# Patient Record
Sex: FEMALE | Race: WHITE | NOT HISPANIC OR LATINO | Employment: FULL TIME | URBAN - METROPOLITAN AREA
[De-identification: names, ages, dates, MRNs, and addresses within clinical notes are randomized per-mention and may not be internally consistent; named-entity substitution may affect disease eponyms.]

---

## 2019-05-20 ENCOUNTER — OFFICE VISIT (OUTPATIENT)
Dept: PRIMARY CARE CLINIC | Facility: CLINIC | Age: 54
End: 2019-05-20
Payer: COMMERCIAL

## 2019-05-20 VITALS
OXYGEN SATURATION: 99 % | DIASTOLIC BLOOD PRESSURE: 80 MMHG | BODY MASS INDEX: 27.56 KG/M2 | HEART RATE: 62 BPM | WEIGHT: 146 LBS | SYSTOLIC BLOOD PRESSURE: 112 MMHG | HEIGHT: 61 IN

## 2019-05-20 DIAGNOSIS — F90.0 ATTENTION DEFICIT HYPERACTIVITY DISORDER (ADHD), PREDOMINANTLY INATTENTIVE TYPE: ICD-10-CM

## 2019-05-20 DIAGNOSIS — Z00.00 PREVENTATIVE HEALTH CARE: Primary | ICD-10-CM

## 2019-05-20 DIAGNOSIS — F41.9 ANXIETY: ICD-10-CM

## 2019-05-20 DIAGNOSIS — G47.00 INSOMNIA, UNSPECIFIED TYPE: ICD-10-CM

## 2019-05-20 DIAGNOSIS — N95.9 MENOPAUSAL DISORDER: ICD-10-CM

## 2019-05-20 PROCEDURE — 3008F PR BODY MASS INDEX (BMI) DOCUMENTED: ICD-10-PCS | Mod: CPTII,S$GLB,, | Performed by: FAMILY MEDICINE

## 2019-05-20 PROCEDURE — 99204 PR OFFICE/OUTPT VISIT, NEW, LEVL IV, 45-59 MIN: ICD-10-PCS | Mod: S$GLB,,, | Performed by: FAMILY MEDICINE

## 2019-05-20 PROCEDURE — 3008F BODY MASS INDEX DOCD: CPT | Mod: CPTII,S$GLB,, | Performed by: FAMILY MEDICINE

## 2019-05-20 PROCEDURE — 99204 OFFICE O/P NEW MOD 45 MIN: CPT | Mod: S$GLB,,, | Performed by: FAMILY MEDICINE

## 2019-05-20 RX ORDER — ESTRADIOL 1 MG/1
TABLET ORAL
COMMUNITY
End: 2019-08-02 | Stop reason: SDUPTHER

## 2019-05-20 RX ORDER — AMITRIPTYLINE HYDROCHLORIDE 25 MG/1
TABLET, FILM COATED ORAL
COMMUNITY
End: 2019-05-20

## 2019-05-20 RX ORDER — CLONAZEPAM 1 MG/1
TABLET ORAL
COMMUNITY
End: 2019-08-02 | Stop reason: SDUPTHER

## 2019-05-20 RX ORDER — HYDROXYZINE PAMOATE 50 MG/1
50 CAPSULE ORAL NIGHTLY PRN
Qty: 30 CAPSULE | Refills: 0 | Status: SHIPPED | OUTPATIENT
Start: 2019-05-20 | End: 2019-06-24

## 2019-05-20 RX ORDER — LISDEXAMFETAMINE DIMESYLATE 30 MG/1
CAPSULE ORAL
COMMUNITY
Start: 2019-04-26 | End: 2019-05-20 | Stop reason: SDUPTHER

## 2019-05-20 RX ORDER — LISDEXAMFETAMINE DIMESYLATE 30 MG/1
30 CAPSULE ORAL EVERY MORNING
Qty: 30 CAPSULE | Refills: 0 | Status: SHIPPED | OUTPATIENT
Start: 2019-05-20 | End: 2019-06-24 | Stop reason: SDUPTHER

## 2019-05-20 NOTE — PROGRESS NOTES
Subjective:       Patient ID: Isabella Santana is a 53 y.o. female.    Chief Complaint: est care    HPI     Anxiety/depression - was recently put on clonazepam prn, makes her sleepy. Was tried on multiple therapies in the past (lexapro, celexa, effexor, wellbutrin)    ADHD - on vyvanse 30 mg, may want to increase dose. Was on 40 mg in the past    Menopausal disorder - on estrace for HRT    Insomnia - takes amitriptyline nightly     Prev - MMG 6 mo ago, has never had a c-scope, did cologuard end of last year. Hx of hys. UTD on vacs    Review of Systems   Constitutional: Positive for fatigue. Negative for chills and fever.   Eyes: Negative for visual disturbance.   Respiratory: Negative for cough, chest tightness, shortness of breath and wheezing.    Cardiovascular: Negative for chest pain, palpitations and leg swelling.   Gastrointestinal: Negative for abdominal pain, constipation, diarrhea, nausea and vomiting.   Genitourinary: Negative for decreased urine volume, dysuria and frequency.   Musculoskeletal: Negative for arthralgias and myalgias.   Skin: Negative for rash.   Neurological: Negative for dizziness, syncope, weakness, light-headedness and headaches.   Psychiatric/Behavioral: Positive for dysphoric mood and sleep disturbance. The patient is nervous/anxious.        Objective:      Physical Exam   Constitutional: She is oriented to person, place, and time. She appears well-developed and well-nourished. No distress.   HENT:   Head: Normocephalic and atraumatic.   Nose: Nose normal.   Mouth/Throat: Oropharynx is clear and moist.   Eyes: Conjunctivae are normal.   Neck: Normal range of motion. Neck supple. No thyromegaly present.   Cardiovascular: Normal rate, regular rhythm and normal heart sounds. Exam reveals no gallop and no friction rub.   No murmur heard.  Pulmonary/Chest: Effort normal and breath sounds normal. No stridor. No respiratory distress. She has no wheezes. She has no rales.   Abdominal: Soft.  Bowel sounds are normal. She exhibits no distension. There is no tenderness.   Musculoskeletal: She exhibits no edema.   Neurological: She is alert and oriented to person, place, and time.   Skin: Skin is warm and dry. No rash noted. She is not diaphoretic. No erythema. No pallor.   Psychiatric: She has a normal mood and affect. Her behavior is normal.   Vitals reviewed.      Assessment:       1. Preventative health care    2. Attention deficit hyperactivity disorder (ADHD), predominantly inattentive type    3. Anxiety    4. Menopausal disorder    5. Insomnia, unspecified type        Plan:       Diagnoses and all orders for this visit:    Preventative health care  Comments:  needs mmg in 6 months, had cologuard 6 mo ago, hx hys, vacs UTD    Attention deficit hyperactivity disorder (ADHD), predominantly inattentive type  -     VYVANSE 30 mg capsule; Take 1 capsule (30 mg total) by mouth every morning.    Anxiety  Comments:  start viibryd, will wean clonazepam    Menopausal disorder  Comments:  on HRT    Insomnia, unspecified type  -     hydrOXYzine pamoate (VISTARIL) 50 MG Cap; Take 1 capsule (50 mg total) by mouth nightly as needed.

## 2019-05-21 ENCOUNTER — TELEPHONE (OUTPATIENT)
Dept: PRIMARY CARE CLINIC | Facility: CLINIC | Age: 54
End: 2019-05-21

## 2019-06-24 ENCOUNTER — OFFICE VISIT (OUTPATIENT)
Dept: PRIMARY CARE CLINIC | Facility: CLINIC | Age: 54
End: 2019-06-24
Payer: COMMERCIAL

## 2019-06-24 VITALS
HEART RATE: 97 BPM | WEIGHT: 142 LBS | OXYGEN SATURATION: 97 % | BODY MASS INDEX: 26.83 KG/M2 | DIASTOLIC BLOOD PRESSURE: 70 MMHG | SYSTOLIC BLOOD PRESSURE: 110 MMHG

## 2019-06-24 DIAGNOSIS — L25.9 CONTACT DERMATITIS, UNSPECIFIED CONTACT DERMATITIS TYPE, UNSPECIFIED TRIGGER: ICD-10-CM

## 2019-06-24 DIAGNOSIS — F90.0 ATTENTION DEFICIT HYPERACTIVITY DISORDER (ADHD), PREDOMINANTLY INATTENTIVE TYPE: ICD-10-CM

## 2019-06-24 DIAGNOSIS — G47.00 INSOMNIA, UNSPECIFIED TYPE: ICD-10-CM

## 2019-06-24 DIAGNOSIS — F41.1 GENERALIZED ANXIETY DISORDER: Primary | ICD-10-CM

## 2019-06-24 PROCEDURE — 99214 PR OFFICE/OUTPT VISIT, EST, LEVL IV, 30-39 MIN: ICD-10-PCS | Mod: S$GLB,,, | Performed by: FAMILY MEDICINE

## 2019-06-24 PROCEDURE — 99214 OFFICE O/P EST MOD 30 MIN: CPT | Mod: S$GLB,,, | Performed by: FAMILY MEDICINE

## 2019-06-24 PROCEDURE — 3008F BODY MASS INDEX DOCD: CPT | Mod: CPTII,S$GLB,, | Performed by: FAMILY MEDICINE

## 2019-06-24 PROCEDURE — 3008F PR BODY MASS INDEX (BMI) DOCUMENTED: ICD-10-PCS | Mod: CPTII,S$GLB,, | Performed by: FAMILY MEDICINE

## 2019-06-24 RX ORDER — LISDEXAMFETAMINE DIMESYLATE 30 MG/1
30 CAPSULE ORAL EVERY MORNING
Qty: 30 CAPSULE | Refills: 0 | Status: SHIPPED | OUTPATIENT
Start: 2019-06-24 | End: 2019-06-24 | Stop reason: SDUPTHER

## 2019-06-24 RX ORDER — TRAZODONE HYDROCHLORIDE 50 MG/1
50 TABLET ORAL NIGHTLY
Qty: 30 TABLET | Refills: 3 | Status: SHIPPED | OUTPATIENT
Start: 2019-06-24 | End: 2019-08-02

## 2019-06-24 RX ORDER — LISDEXAMFETAMINE DIMESYLATE 30 MG/1
30 CAPSULE ORAL EVERY MORNING
Qty: 30 CAPSULE | Refills: 0 | Status: SHIPPED | OUTPATIENT
Start: 2019-06-24 | End: 2019-09-16 | Stop reason: SDUPTHER

## 2019-06-24 NOTE — PROGRESS NOTES
Subjective:       Patient ID: Isabella Santana is a 53 y.o. female.    Chief Complaint: Follow-up (stopped vybrid-diarrhea; hydroxyzine-bad dreams and would not keep pt asleep)    HPI     Anxiety - intolerant to vybriid, had diarrhea. Stopped taking on her own. Anxiety improved, thinks she may be ok only taking prn med very infrequently.     She also has persistent rash on LUE that has not improved with otc itch cream    Vertigo - had episode of dizziness recently when very stressed, took meclizine. Saw ent in the past    ADHD - doing well on current dose    Insomnia - hydroxyzine not working well and gave her strange dreams    Review of Systems   Constitutional: Negative for chills, fatigue and fever.   Eyes: Negative for visual disturbance.   Respiratory: Negative for cough, chest tightness, shortness of breath and wheezing.    Cardiovascular: Negative for chest pain, palpitations and leg swelling.   Gastrointestinal: Positive for diarrhea. Negative for abdominal pain, constipation, nausea and vomiting.   Genitourinary: Negative for dysuria and frequency.   Musculoskeletal: Negative for arthralgias and myalgias.   Skin: Positive for rash.   Neurological: Positive for dizziness. Negative for syncope, weakness, light-headedness and headaches.   Psychiatric/Behavioral: Positive for sleep disturbance. Negative for dysphoric mood. The patient is nervous/anxious.        Objective:      Physical Exam   Constitutional: She is oriented to person, place, and time. She appears well-developed and well-nourished. No distress.   HENT:   Head: Normocephalic and atraumatic.   Nose: Nose normal.   Mouth/Throat: Oropharynx is clear and moist.   Eyes: Conjunctivae are normal.   Neck: Normal range of motion. Neck supple. No JVD present. No thyromegaly present.   Cardiovascular: Normal rate, regular rhythm and normal heart sounds. Exam reveals no gallop and no friction rub.   No murmur heard.  Pulmonary/Chest: Effort normal and breath  sounds normal. No stridor. No respiratory distress. She has no wheezes.   Abdominal: Soft. She exhibits no distension.   Musculoskeletal: She exhibits no edema.   Neurological: She is alert and oriented to person, place, and time.   Skin: Skin is warm and dry. Rash (erythematous papular rash LUE) noted. She is not diaphoretic. No erythema. No pallor.   Psychiatric: She has a normal mood and affect. Her behavior is normal.   Vitals reviewed.      Assessment:       1. Generalized anxiety disorder    2. Insomnia, unspecified type    3. Contact dermatitis, unspecified contact dermatitis type, unspecified trigger    4. Attention deficit hyperactivity disorder (ADHD), predominantly inattentive type        Plan:       Isabella was seen today for follow-up.    Diagnoses and all orders for this visit:    Generalized anxiety disorder  Comments:  prn clonazepam 1-2 times per week max. if not covered on this, could try another ssri    Insomnia, unspecified type  -     traZODone (DESYREL) 50 MG tablet; Take 1 tablet (50 mg total) by mouth every evening.    Contact dermatitis, unspecified contact dermatitis type, unspecified trigger  Comments:  otc hydrocortisone    Attention deficit hyperactivity disorder (ADHD), predominantly inattentive type  -     Discontinue: VYVANSE 30 mg capsule; Take 1 capsule (30 mg total) by mouth every morning.  -     Discontinue: VYVANSE 30 mg capsule; Take 1 capsule (30 mg total) by mouth every morning. Fill 7/24/19 or later  -     VYVANSE 30 mg capsule; Take 1 capsule (30 mg total) by mouth every morning. Fill 8/24/19 or later

## 2019-08-02 ENCOUNTER — OFFICE VISIT (OUTPATIENT)
Dept: PRIMARY CARE CLINIC | Facility: CLINIC | Age: 54
End: 2019-08-02
Payer: COMMERCIAL

## 2019-08-02 VITALS — WEIGHT: 141 LBS | BODY MASS INDEX: 26.64 KG/M2 | DIASTOLIC BLOOD PRESSURE: 82 MMHG | SYSTOLIC BLOOD PRESSURE: 110 MMHG

## 2019-08-02 DIAGNOSIS — F41.1 GENERALIZED ANXIETY DISORDER: Primary | ICD-10-CM

## 2019-08-02 DIAGNOSIS — N95.9 MENOPAUSAL DISORDER: ICD-10-CM

## 2019-08-02 DIAGNOSIS — G47.00 INSOMNIA, UNSPECIFIED TYPE: ICD-10-CM

## 2019-08-02 DIAGNOSIS — F90.0 ATTENTION DEFICIT HYPERACTIVITY DISORDER (ADHD), PREDOMINANTLY INATTENTIVE TYPE: ICD-10-CM

## 2019-08-02 PROCEDURE — 3008F BODY MASS INDEX DOCD: CPT | Mod: CPTII,S$GLB,, | Performed by: FAMILY MEDICINE

## 2019-08-02 PROCEDURE — 99214 PR OFFICE/OUTPT VISIT, EST, LEVL IV, 30-39 MIN: ICD-10-PCS | Mod: S$GLB,,, | Performed by: FAMILY MEDICINE

## 2019-08-02 PROCEDURE — 3008F PR BODY MASS INDEX (BMI) DOCUMENTED: ICD-10-PCS | Mod: CPTII,S$GLB,, | Performed by: FAMILY MEDICINE

## 2019-08-02 PROCEDURE — 99214 OFFICE O/P EST MOD 30 MIN: CPT | Mod: S$GLB,,, | Performed by: FAMILY MEDICINE

## 2019-08-02 RX ORDER — CLONAZEPAM 1 MG/1
1 TABLET ORAL 2 TIMES DAILY PRN
Qty: 60 TABLET | Refills: 0 | Status: SHIPPED | OUTPATIENT
Start: 2019-08-02 | End: 2019-11-05 | Stop reason: SDUPTHER

## 2019-08-02 RX ORDER — DOXEPIN HYDROCHLORIDE 25 MG/1
25 CAPSULE ORAL NIGHTLY PRN
Qty: 30 CAPSULE | Refills: 1 | Status: SHIPPED | OUTPATIENT
Start: 2019-08-02 | End: 2019-08-25 | Stop reason: SDUPTHER

## 2019-08-02 RX ORDER — ESTRADIOL 1 MG/1
1 TABLET ORAL DAILY
Qty: 90 TABLET | Refills: 1 | Status: SHIPPED | OUTPATIENT
Start: 2019-08-02 | End: 2020-01-22

## 2019-08-02 NOTE — PROGRESS NOTES
"Subjective:       Patient ID: Isabella Santana is a 53 y.o. female.    Chief Complaint: Follow-up    HPI     F/u of chronic medical conditions.    Insomnia - failed on trazodone, amitriptyline and vistaril. Has also tried otc meds without success    ADHD - doing well on current dose of med. No cp, palps, chest tightness, gallo, ble swelling. bp well controlled.    Anxiety - takes clonazepam infrequently and anxiety has been well controlled on this. Pt says she is feeling more "like herself" the past couple of weeks.    She is also on HRT and needs refill of estrace tabs. Hx hys and is utd on mmg.     Review of Systems   Constitutional: Negative for chills, diaphoresis, fatigue and fever.   HENT: Negative for congestion, hearing loss, rhinorrhea and sore throat.    Eyes: Negative for pain and visual disturbance.   Respiratory: Negative for cough, chest tightness, shortness of breath and wheezing.    Cardiovascular: Negative for chest pain, palpitations and leg swelling.   Gastrointestinal: Negative for abdominal pain, constipation, diarrhea, nausea and vomiting.   Genitourinary: Negative for decreased urine volume, dysuria, frequency and hematuria.   Musculoskeletal: Negative for arthralgias, back pain and myalgias.   Skin: Negative for color change, pallor and rash.   Neurological: Negative for dizziness, syncope, weakness, light-headedness and headaches.   Psychiatric/Behavioral: Positive for sleep disturbance. Negative for dysphoric mood. The patient is nervous/anxious.        Objective:      Physical Exam   Constitutional: She is oriented to person, place, and time. She appears well-developed and well-nourished. No distress.   HENT:   Head: Normocephalic and atraumatic.   Nose: Nose normal.   Mouth/Throat: Oropharynx is clear and moist.   Eyes: Conjunctivae are normal.   Neck: Normal range of motion. Neck supple. No JVD present. No thyromegaly present.   Cardiovascular: Normal rate, regular rhythm, normal heart " sounds and intact distal pulses. Exam reveals no gallop and no friction rub.   No murmur heard.  Pulmonary/Chest: Effort normal and breath sounds normal. No stridor. No respiratory distress. She has no wheezes. She has no rales.   Abdominal: Soft. Bowel sounds are normal. She exhibits no distension. There is no tenderness.   Musculoskeletal: She exhibits no edema or tenderness.   Lymphadenopathy:     She has no cervical adenopathy.   Neurological: She is alert and oriented to person, place, and time.   Skin: Skin is warm and dry. No rash noted. She is not diaphoretic. No erythema. No pallor.   Psychiatric: She has a normal mood and affect. Her behavior is normal.   Vitals reviewed.      Assessment:       1. Generalized anxiety disorder    2. Insomnia, unspecified type    3. Attention deficit hyperactivity disorder (ADHD), predominantly inattentive type    4. Menopausal disorder        Plan:       Generalized anxiety disorder  -     clonazePAM (KLONOPIN) 1 MG tablet; Take 1 tablet (1 mg total) by mouth 2 (two) times daily as needed for Anxiety.  Dispense: 60 tablet; Refill: 0    Insomnia, unspecified type  -     doxepin (SINEQUAN) 25 MG capsule; Take 1 capsule (25 mg total) by mouth nightly as needed (sleep).  Dispense: 30 capsule; Refill: 1    Attention deficit hyperactivity disorder (ADHD), predominantly inattentive type  Comments:  has refills vyvanse    Menopausal disorder  -     estradiol (ESTRACE) 1 MG tablet; Take 1 tablet (1 mg total) by mouth once daily.  Dispense: 90 tablet; Refill: 1

## 2019-08-25 DIAGNOSIS — G47.00 INSOMNIA, UNSPECIFIED TYPE: ICD-10-CM

## 2019-08-26 RX ORDER — DOXEPIN HYDROCHLORIDE 25 MG/1
25 CAPSULE ORAL NIGHTLY PRN
Qty: 30 CAPSULE | Refills: 1 | Status: SHIPPED | OUTPATIENT
Start: 2019-08-26 | End: 2019-09-20 | Stop reason: SDUPTHER

## 2019-09-16 ENCOUNTER — OFFICE VISIT (OUTPATIENT)
Dept: PRIMARY CARE CLINIC | Facility: CLINIC | Age: 54
End: 2019-09-16
Payer: COMMERCIAL

## 2019-09-16 VITALS
BODY MASS INDEX: 25.86 KG/M2 | OXYGEN SATURATION: 97 % | HEART RATE: 68 BPM | WEIGHT: 137 LBS | DIASTOLIC BLOOD PRESSURE: 72 MMHG | RESPIRATION RATE: 18 BRPM | HEIGHT: 61 IN | SYSTOLIC BLOOD PRESSURE: 112 MMHG

## 2019-09-16 DIAGNOSIS — E53.8 VITAMIN B12 DEFICIENCY: ICD-10-CM

## 2019-09-16 DIAGNOSIS — F90.0 ATTENTION DEFICIT HYPERACTIVITY DISORDER (ADHD), PREDOMINANTLY INATTENTIVE TYPE: ICD-10-CM

## 2019-09-16 DIAGNOSIS — E03.9 HYPOTHYROIDISM, UNSPECIFIED TYPE: ICD-10-CM

## 2019-09-16 DIAGNOSIS — L30.9 ECZEMA, UNSPECIFIED TYPE: ICD-10-CM

## 2019-09-16 DIAGNOSIS — Z13.1 DIABETES MELLITUS SCREENING: ICD-10-CM

## 2019-09-16 DIAGNOSIS — Z00.00 ANNUAL PHYSICAL EXAM: Primary | ICD-10-CM

## 2019-09-16 DIAGNOSIS — Z12.11 COLON CANCER SCREENING: ICD-10-CM

## 2019-09-16 DIAGNOSIS — E55.9 VITAMIN D DEFICIENCY: ICD-10-CM

## 2019-09-16 DIAGNOSIS — Z12.39 BREAST SCREENING: ICD-10-CM

## 2019-09-16 PROCEDURE — 3008F PR BODY MASS INDEX (BMI) DOCUMENTED: ICD-10-PCS | Mod: CPTII,S$GLB,, | Performed by: NURSE PRACTITIONER

## 2019-09-16 PROCEDURE — 99214 OFFICE O/P EST MOD 30 MIN: CPT | Mod: S$GLB,,, | Performed by: NURSE PRACTITIONER

## 2019-09-16 PROCEDURE — 99214 PR OFFICE/OUTPT VISIT, EST, LEVL IV, 30-39 MIN: ICD-10-PCS | Mod: S$GLB,,, | Performed by: NURSE PRACTITIONER

## 2019-09-16 PROCEDURE — 3008F BODY MASS INDEX DOCD: CPT | Mod: CPTII,S$GLB,, | Performed by: NURSE PRACTITIONER

## 2019-09-16 RX ORDER — HYDROXYZINE HYDROCHLORIDE 25 MG/1
TABLET, FILM COATED ORAL
Qty: 60 TABLET | Refills: 2 | Status: SHIPPED | OUTPATIENT
Start: 2019-09-16 | End: 2020-06-17

## 2019-09-16 RX ORDER — LISDEXAMFETAMINE DIMESYLATE 30 MG/1
30 CAPSULE ORAL EVERY MORNING
Qty: 30 CAPSULE | Refills: 0 | Status: SHIPPED | OUTPATIENT
Start: 2019-09-16 | End: 2019-11-05 | Stop reason: SDUPTHER

## 2019-09-16 RX ORDER — TRIAMCINOLONE ACETONIDE 1 MG/G
CREAM TOPICAL 2 TIMES DAILY
Qty: 45 G | Refills: 2 | Status: SHIPPED | OUTPATIENT
Start: 2019-09-16 | End: 2020-06-17

## 2019-09-16 NOTE — PATIENT INSTRUCTIONS
Treating Attention Deficit/Hyperactivity Disorder (ADHD, ADD) in Adults  Attention deficit hyperactivity disorder (ADHD) starts in childhood. It may continue throughout your life. When it does, it may affect your job and even your relationships. Fortunately, with help, you can manage ADHD.     Treatment for ADD can help you achieve your goals.   Therapy  Your therapist can help you learn healthy ways to cope with ADHD. Sometimes, your partner or family may attend your sessions with you. This helps them understand more about your disorder.  Coaching  An ADHD  works with you to achieve your goals. Youll learn the best ways to manage your time. Youll also learn to avoid clutter and noise that may distract you. In time, your life will have more order and structure. And your  will provide support and feedback on your progress.  Work  Look for jobs where you can be free and creative. Avoid those that are dull or centered on details. You may still need to make a special effort. The following tips may help:  · Try to work at home, at least part-time.  · Ask for a private office.  · Use headphones to muffle noise.  · Work on more than one project at the same time. When you get bored with one, switch to the other.  · Work on boring tasks when you feel most alert.  · Have a schedule for each day.  · Ask your  or  to help with details.  · Use a day planner and to-do lists. Write yourself notes.  · Reward yourself when you finish a task.  Medicines  In some cases, medicines can help control symptoms of ADHD. Your healthcare provider may prescribe a stimulant to help you stay focused. Or you may take a type of antidepressant. It may take some time to find what works best for you. Keep in mind that medicines dont cure ADHD. And they may cause side effects such as headaches, trouble sleeping, or stomach problems. If youre bothered by side effects, be sure to tell your healthcare provider.  Changing the dose or type of medicine may help. Most often, youll use medicine along with therapy, coaching, and lifestyle changes.  Date Last Reviewed: 1/1/2017  © 4100-8279 [employer]. [address] [place] [address], [place] [address]. All rights reserved. This information is not intended as a substitute for professional medical care. Always follow your healthcare professional's instructions.

## 2019-09-16 NOTE — PROGRESS NOTES
Subjective:       Patient ID: Isabella Santana is a 53 y.o. female.    Chief Complaint: Follow-up (started on doxepin/need vyvanse rf)    HPI:    Presents for check up.     ADD - needs RF on Vyvanse, tolerating weil, no s/e, able to stay focused and complete tasks within timely manner.     Insomnia - improved and controlled with current med regimen. Has never had sleep study.     Has not had labs done in > 6 months.     Hormone therapy - tolerating well, no s/e.     C/o intermittent eczema - left upper arm, flares up occasionally, and OTC steroid cream does not help much. Itches and burns.       Is due for mammogram.     Colon cancer screen - does cologuard d/t sever tear in colon that occurred in 2nd childbirth.    GYN exam- had total hysterectomy.    Bone density scan - 2 years ago normal    Vaccines - get at work        Review of Systems   Constitutional: Positive for fatigue (at times). Negative for activity change, chills, diaphoresis and fever.   HENT: Negative for ear pain, mouth sores, nosebleeds and trouble swallowing.    Eyes: Negative for pain and visual disturbance.   Respiratory: Negative for cough, chest tightness and shortness of breath.    Cardiovascular: Negative for chest pain, palpitations and leg swelling.   Gastrointestinal: Negative for abdominal distention, abdominal pain and blood in stool.   Endocrine: Negative for polydipsia, polyphagia and polyuria.   Genitourinary: Negative for flank pain, frequency and hematuria.   Musculoskeletal: Negative for gait problem, joint swelling, neck pain and neck stiffness.   Skin: Negative for pallor.   Neurological: Negative for dizziness, syncope, light-headedness and headaches.   Hematological: Negative for adenopathy. Does not bruise/bleed easily.   Psychiatric/Behavioral: Negative for confusion and suicidal ideas.     Objective:     Physical Exam   Constitutional: She is oriented to person, place, and time. She appears well-developed and well-nourished.  No distress.   HENT:   Head: Normocephalic and atraumatic.   Mouth/Throat: Mucous membranes are normal. Mucous membranes are not pale, not dry and not cyanotic.   Eyes: Pupils are equal, round, and reactive to light. Conjunctivae and EOM are normal.   Neck: Normal range of motion. Neck supple. No JVD present. No tracheal deviation present. No thyromegaly present.   Cardiovascular: Normal rate, regular rhythm, normal heart sounds and intact distal pulses.   Pulmonary/Chest: Effort normal and breath sounds normal. No stridor. No respiratory distress. She has no wheezes. She has no rales.   Abdominal: Soft. Bowel sounds are normal. She exhibits no distension. There is no tenderness. There is no guarding.   Musculoskeletal: Normal range of motion. She exhibits no edema.   Lymphadenopathy:     She has no cervical adenopathy.   Neurological: She is alert and oriented to person, place, and time.   Skin: Skin is warm and dry. Capillary refill takes less than 2 seconds. She is not diaphoretic.   Psychiatric: She has a normal mood and affect. Her speech is normal and behavior is normal. Judgment and thought content normal. Cognition and memory are normal.   Nursing note and vitals reviewed.    Assessment:      1. Annual physical exam    2. Hypothyroidism, unspecified type    3. Vitamin B12 deficiency    4. Vitamin D deficiency    5. Diabetes mellitus screening    6. Breast screening    7. Colon cancer screening    8. Attention deficit hyperactivity disorder (ADHD), predominantly inattentive type    9. Eczema, unspecified type    10. BMI 25.0-25.9,adult      Plan:     Annual physical exam  Comments:  Labs.   Orders:  -     CBC auto differential; Future; Expected date: 09/16/2019  -     Comprehensive metabolic panel; Future; Expected date: 09/16/2019  -     TSH; Future; Expected date: 09/16/2019  -     Urinalysis, Reflex to Urine Culture Urine, Clean Catch; Future  -     Lipid panel; Future; Expected date:  09/16/2019    Hypothyroidism, unspecified type  -     T3, free; Future; Expected date: 09/16/2019  -     T4, free; Future; Expected date: 09/16/2019    Vitamin B12 deficiency  -     Vitamin B12/folate, serum panel; Future; Expected date: 09/16/2019    Vitamin D deficiency  -     Vitamin D; Future; Expected date: 09/16/2019    Diabetes mellitus screening  -     Hemoglobin A1c; Future; Expected date: 09/16/2019    Breast screening  Comments:  Order mammogram.  Orders:  -     Mammo Digital Screening Bilat; Future; Expected date: 09/16/2019    Colon cancer screening  Comments:  Refuses colonscopy d/t severe tear colon that occured with second childbirth.  Orders:  -     Cologuard Screening (Multitarget Stool DNA); Future; Expected date: 09/16/2019    Attention deficit hyperactivity disorder (ADHD), predominantly inattentive type  Comments:  RF Vyvanse.   Orders:  -     VYVANSE 30 mg capsule; Take 1 capsule (30 mg total) by mouth every morning.  Dispense: 30 capsule; Refill: 0    Eczema, unspecified type  Comments:  Meds as prescribed.   Orders:  -     triamcinolone acetonide 0.1% (KENALOG) 0.1 % cream; Apply topically 2 (two) times daily. No longer than 14 days at a time.  Dispense: 45 g; Refill: 2  -     hydrOXYzine HCl (ATARAX) 25 MG tablet; 1/2 tab - 1 tab TID PRN itching during eczema flare up  Dispense: 60 tablet; Refill: 2    BMI 25.0-25.9,adult  Comments:  Discussed diet and exercise.     LA  checked and consistent with pt hx. RTC in 3 months for check up or sooner if needed. Will call each month for RF and will send electronically.      Will check labs and further develop POC based on results.     Cont meds as directed.     Risks, benefits, and alternatives discussed with patient, Patient verbalized understanding of discussed plan of care. Asked patient if any further questions, answered no.

## 2019-09-18 DIAGNOSIS — E55.9 VITAMIN D DEFICIENCY: Primary | ICD-10-CM

## 2019-09-18 LAB
25(OH)D3 SERPL-MCNC: 23 NG/ML (ref 30–100)
ALBUMIN SERPL-MCNC: 4.9 G/DL (ref 3.6–5.1)
ALBUMIN/GLOB SERPL: 2 (CALC) (ref 1–2.5)
ALP SERPL-CCNC: 67 U/L (ref 33–130)
ALT SERPL-CCNC: 9 U/L (ref 6–29)
APPEARANCE UR: CLEAR
AST SERPL-CCNC: 14 U/L (ref 10–35)
BACTERIA #/AREA URNS HPF: ABNORMAL /HPF
BACTERIA UR CULT: ABNORMAL
BACTERIA UR CULT: NORMAL
BASOPHILS # BLD AUTO: 21 CELLS/UL (ref 0–200)
BASOPHILS NFR BLD AUTO: 0.4 %
BILIRUB SERPL-MCNC: 0.4 MG/DL (ref 0.2–1.2)
BILIRUB UR QL STRIP: NEGATIVE
BUN SERPL-MCNC: 11 MG/DL (ref 7–25)
BUN/CREAT SERPL: NORMAL (CALC) (ref 6–22)
CALCIUM SERPL-MCNC: 9.3 MG/DL (ref 8.6–10.4)
CAOX CRY #/AREA URNS HPF: ABNORMAL /HPF
CHLORIDE SERPL-SCNC: 102 MMOL/L (ref 98–110)
CHOLEST SERPL-MCNC: 253 MG/DL
CHOLEST/HDLC SERPL: 2.6 (CALC)
CO2 SERPL-SCNC: 24 MMOL/L (ref 20–32)
COLOR UR: ABNORMAL
CREAT SERPL-MCNC: 0.93 MG/DL (ref 0.5–1.05)
EOSINOPHIL # BLD AUTO: 111 CELLS/UL (ref 15–500)
EOSINOPHIL NFR BLD AUTO: 2.1 %
ERYTHROCYTE [DISTWIDTH] IN BLOOD BY AUTOMATED COUNT: 13.4 % (ref 11–15)
FOLATE SERPL-MCNC: 10.3 NG/ML
GFRSERPLBLD MDRD-ARVRAT: 69 ML/MIN/1.73M2
GLOBULIN SER CALC-MCNC: 2.4 G/DL (CALC) (ref 1.9–3.7)
GLUCOSE SERPL-MCNC: 83 MG/DL (ref 65–99)
GLUCOSE UR QL STRIP: NEGATIVE
HBA1C MFR BLD: 5 % OF TOTAL HGB
HCT VFR BLD AUTO: 36.7 % (ref 35–45)
HDLC SERPL-MCNC: 96 MG/DL
HGB BLD-MCNC: 12.1 G/DL (ref 11.7–15.5)
HGB UR QL STRIP: NEGATIVE
HYALINE CASTS #/AREA URNS LPF: ABNORMAL /LPF
KETONES UR QL STRIP: ABNORMAL
LDLC SERPL CALC-MCNC: 134 MG/DL (CALC)
LEUKOCYTE ESTERASE UR QL STRIP: ABNORMAL
LYMPHOCYTES # BLD AUTO: 1919 CELLS/UL (ref 850–3900)
LYMPHOCYTES NFR BLD AUTO: 36.2 %
MCH RBC QN AUTO: 26.6 PG (ref 27–33)
MCHC RBC AUTO-ENTMCNC: 33 G/DL (ref 32–36)
MCV RBC AUTO: 80.7 FL (ref 80–100)
MONOCYTES # BLD AUTO: 292 CELLS/UL (ref 200–950)
MONOCYTES NFR BLD AUTO: 5.5 %
NEUTROPHILS # BLD AUTO: 2957 CELLS/UL (ref 1500–7800)
NEUTROPHILS NFR BLD AUTO: 55.8 %
NITRITE UR QL STRIP: NEGATIVE
NONHDLC SERPL-MCNC: 157 MG/DL (CALC)
PH UR STRIP: ABNORMAL [PH] (ref 5–8)
PLATELET # BLD AUTO: 250 THOUSAND/UL (ref 140–400)
PMV BLD REES-ECKER: 11.3 FL (ref 7.5–12.5)
POTASSIUM SERPL-SCNC: 3.6 MMOL/L (ref 3.5–5.3)
PROT SERPL-MCNC: 7.3 G/DL (ref 6.1–8.1)
PROT UR QL STRIP: NEGATIVE
RBC # BLD AUTO: 4.55 MILLION/UL (ref 3.8–5.1)
RBC #/AREA URNS HPF: ABNORMAL /HPF
SODIUM SERPL-SCNC: 139 MMOL/L (ref 135–146)
SP GR UR STRIP: 1.03 (ref 1–1.03)
SQUAMOUS #/AREA URNS HPF: ABNORMAL /HPF
T3FREE SERPL-MCNC: 3.2 PG/ML (ref 2.3–4.2)
T4 FREE SERPL-MCNC: 1.2 NG/DL (ref 0.8–1.8)
TRIGL SERPL-MCNC: 121 MG/DL
TSH SERPL-ACNC: 1.61 MIU/L
VIT B12 SERPL-MCNC: 413 PG/ML (ref 200–1100)
WBC # BLD AUTO: 5.3 THOUSAND/UL (ref 3.8–10.8)
WBC #/AREA URNS HPF: ABNORMAL /HPF

## 2019-09-18 RX ORDER — ERGOCALCIFEROL 1.25 MG/1
50000 CAPSULE ORAL
Qty: 12 CAPSULE | Refills: 0 | Status: SHIPPED | OUTPATIENT
Start: 2019-09-18 | End: 2019-12-03 | Stop reason: SDUPTHER

## 2019-09-18 NOTE — PROGRESS NOTES
Please notify pt of results:   1. Cholesterol is elevated - , supposed to < 70. I would recommend STATIN therapy but if she does not want to start medicine now that is ok, she can try low cholesterol diet and exercise first.     2. Vit D level is low - I will send out Vit D to be taken once a week for 12 weeks, then we will recheck next visit.     Everything else was good : )

## 2019-09-20 DIAGNOSIS — G47.00 INSOMNIA, UNSPECIFIED TYPE: ICD-10-CM

## 2019-09-20 RX ORDER — TRAZODONE HYDROCHLORIDE 50 MG/1
TABLET ORAL
Qty: 90 TABLET | Refills: 1 | Status: SHIPPED | OUTPATIENT
Start: 2019-09-20 | End: 2020-06-17

## 2019-09-20 RX ORDER — DOXEPIN HYDROCHLORIDE 25 MG/1
25 CAPSULE ORAL NIGHTLY PRN
Qty: 30 CAPSULE | Refills: 1 | Status: SHIPPED | OUTPATIENT
Start: 2019-09-20 | End: 2019-10-17 | Stop reason: SDUPTHER

## 2019-09-23 ENCOUNTER — TELEPHONE (OUTPATIENT)
Dept: PRIMARY CARE CLINIC | Facility: CLINIC | Age: 54
End: 2019-09-23

## 2019-09-23 NOTE — TELEPHONE ENCOUNTER
----- Message from [facility] sent at 9/18/2019  2:29 PM CDT -----  Please notify pt of results:   1. Cholesterol is elevated - , supposed to < 70. I would recommend STATIN therapy but if she does not want to start medicine now that is ok, she can try low cholesterol diet and exercise first.     2. Vit D level is low - I will send out Vit D to be taken once a week for 12 weeks, then we will recheck next visit.     Everything else was good : )

## 2019-10-17 DIAGNOSIS — G47.00 INSOMNIA, UNSPECIFIED TYPE: ICD-10-CM

## 2019-10-17 RX ORDER — DOXEPIN HYDROCHLORIDE 25 MG/1
25 CAPSULE ORAL NIGHTLY PRN
Qty: 30 CAPSULE | Refills: 1 | Status: SHIPPED | OUTPATIENT
Start: 2019-10-17 | End: 2019-11-12 | Stop reason: SDUPTHER

## 2019-11-05 DIAGNOSIS — F90.0 ATTENTION DEFICIT HYPERACTIVITY DISORDER (ADHD), PREDOMINANTLY INATTENTIVE TYPE: ICD-10-CM

## 2019-11-05 DIAGNOSIS — F41.1 GENERALIZED ANXIETY DISORDER: ICD-10-CM

## 2019-11-05 RX ORDER — CLONAZEPAM 1 MG/1
1 TABLET ORAL 2 TIMES DAILY PRN
Qty: 60 TABLET | Refills: 2 | Status: SHIPPED | OUTPATIENT
Start: 2019-11-05 | End: 2020-06-17 | Stop reason: SDUPTHER

## 2019-11-05 RX ORDER — CLONAZEPAM 1 MG/1
1 TABLET ORAL 2 TIMES DAILY PRN
Qty: 60 TABLET | Refills: 2 | Status: SHIPPED | OUTPATIENT
Start: 2019-11-05 | End: 2019-11-05 | Stop reason: SDUPTHER

## 2019-11-05 RX ORDER — LISDEXAMFETAMINE DIMESYLATE 30 MG/1
30 CAPSULE ORAL EVERY MORNING
Qty: 30 CAPSULE | Refills: 0 | Status: SHIPPED | OUTPATIENT
Start: 2019-11-05 | End: 2019-12-02 | Stop reason: SDUPTHER

## 2019-11-12 DIAGNOSIS — G47.00 INSOMNIA, UNSPECIFIED TYPE: ICD-10-CM

## 2019-11-12 RX ORDER — DOXEPIN HYDROCHLORIDE 25 MG/1
25 CAPSULE ORAL NIGHTLY PRN
Qty: 30 CAPSULE | Refills: 5 | Status: SHIPPED | OUTPATIENT
Start: 2019-11-12 | End: 2020-03-04 | Stop reason: SDUPTHER

## 2019-12-02 DIAGNOSIS — F90.0 ATTENTION DEFICIT HYPERACTIVITY DISORDER (ADHD), PREDOMINANTLY INATTENTIVE TYPE: ICD-10-CM

## 2019-12-02 RX ORDER — LISDEXAMFETAMINE DIMESYLATE 30 MG/1
30 CAPSULE ORAL EVERY MORNING
Qty: 30 CAPSULE | Refills: 0 | Status: SHIPPED | OUTPATIENT
Start: 2019-12-02 | End: 2019-12-18 | Stop reason: DRUGHIGH

## 2019-12-03 DIAGNOSIS — E55.9 VITAMIN D DEFICIENCY: ICD-10-CM

## 2019-12-03 RX ORDER — ERGOCALCIFEROL 1.25 MG/1
50000 CAPSULE ORAL
Qty: 12 CAPSULE | Refills: 3 | Status: SHIPPED | OUTPATIENT
Start: 2019-12-03 | End: 2020-06-17

## 2019-12-18 ENCOUNTER — OFFICE VISIT (OUTPATIENT)
Dept: PRIMARY CARE CLINIC | Facility: CLINIC | Age: 54
End: 2019-12-18
Payer: COMMERCIAL

## 2019-12-18 VITALS
DIASTOLIC BLOOD PRESSURE: 80 MMHG | OXYGEN SATURATION: 99 % | HEART RATE: 82 BPM | BODY MASS INDEX: 26.81 KG/M2 | HEIGHT: 61 IN | SYSTOLIC BLOOD PRESSURE: 120 MMHG | WEIGHT: 142 LBS | RESPIRATION RATE: 16 BRPM

## 2019-12-18 DIAGNOSIS — E55.9 VITAMIN D DEFICIENCY: ICD-10-CM

## 2019-12-18 DIAGNOSIS — F90.0 ATTENTION DEFICIT HYPERACTIVITY DISORDER (ADHD), PREDOMINANTLY INATTENTIVE TYPE: Primary | ICD-10-CM

## 2019-12-18 DIAGNOSIS — F41.1 GENERALIZED ANXIETY DISORDER: ICD-10-CM

## 2019-12-18 DIAGNOSIS — G47.00 INSOMNIA, UNSPECIFIED TYPE: ICD-10-CM

## 2019-12-18 PROCEDURE — 3008F BODY MASS INDEX DOCD: CPT | Mod: CPTII,S$GLB,, | Performed by: NURSE PRACTITIONER

## 2019-12-18 PROCEDURE — 99214 PR OFFICE/OUTPT VISIT, EST, LEVL IV, 30-39 MIN: ICD-10-PCS | Mod: S$GLB,,, | Performed by: NURSE PRACTITIONER

## 2019-12-18 PROCEDURE — 99214 OFFICE O/P EST MOD 30 MIN: CPT | Mod: S$GLB,,, | Performed by: NURSE PRACTITIONER

## 2019-12-18 PROCEDURE — 3008F PR BODY MASS INDEX (BMI) DOCUMENTED: ICD-10-PCS | Mod: CPTII,S$GLB,, | Performed by: NURSE PRACTITIONER

## 2019-12-18 RX ORDER — LISDEXAMFETAMINE DIMESYLATE 40 MG/1
40 CAPSULE ORAL DAILY
Qty: 30 CAPSULE | Refills: 0 | Status: SHIPPED | OUTPATIENT
Start: 2019-12-18 | End: 2020-01-16 | Stop reason: SDUPTHER

## 2019-12-18 NOTE — PROGRESS NOTES
dSubjective:       Patient ID: Isabella Santana is a 54 y.o. female.    Chief Complaint: Follow-up (3mth )    HPI:    Presents for 3 month check up.    ADD - Vyvanse 30mg, tolerating well, but not lasting through the day. Would like to increase to 40mg. Denies s/e.     Anxiety - mood stable, tolerating meds well, no s/e.     Insomnia - sleeping well, tolerating meds well, no s/e.     Vit D def - on vitamin, tolerating well.                 Patient  has a past medical history of ADHD (attention deficit hyperactivity disorder), Anxiety, Hormone deficiency, and Insomnia.    Patient  has a past surgical history that includes [place] tooth extraction; Hysterectomy; and Breast biopsy (Left).    Patient family history includes Cancer in her father and sister; Diabetes in her mother; Heart disease in her father.    Patient  reports that she has quit smoking. Her smoking use included vaping w/o nicotine. She has never used smokeless tobacco. She reports that she drinks alcohol. She reports that she does not use drugs.    Review of Systems   Constitutional: Positive for fatigue (at times). Negative for activity change, chills, diaphoresis and fever.   HENT: Negative for ear pain, mouth sores, nosebleeds and trouble swallowing.    Eyes: Negative for pain and visual disturbance.   Respiratory: Negative for cough, chest tightness and shortness of breath.    Cardiovascular: Negative for chest pain, palpitations and leg swelling.   Gastrointestinal: Negative for abdominal distention, abdominal pain and blood in stool.   Endocrine: Negative for polydipsia, polyphagia and polyuria.   Genitourinary: Negative for flank pain, frequency and hematuria.   Musculoskeletal: Negative for gait problem, joint swelling, neck pain and neck stiffness.   Skin: Negative for pallor.   Neurological: Negative for dizziness, syncope, light-headedness and headaches.   Hematological: Negative for adenopathy. Does not bruise/bleed easily.  "  Psychiatric/Behavioral: Negative for confusion and suicidal ideas.     Objective:     Vitals:    12/18/19 1314   BP: 120/80   BP Location: Right arm   Patient Position: Sitting   BP Method: Small (Manual)   Pulse: 82   Resp: 16   SpO2: 99%   Weight: 64.4 kg (142 lb)   Height: 5' 1" (1.549 m)       Physical Exam   Constitutional: She is oriented to person, place, and time. She appears well-developed and well-nourished. No distress.   HENT:   Head: Normocephalic and atraumatic.   Mouth/Throat: Mucous membranes are normal. Mucous membranes are not pale, not dry and not cyanotic.   Eyes: Pupils are equal, round, and reactive to light. Conjunctivae and EOM are normal.   Neck: Normal range of motion. Neck supple. No JVD present. No tracheal deviation present. No thyromegaly present.   Cardiovascular: Normal rate, regular rhythm, normal heart sounds and intact distal pulses.   Pulmonary/Chest: Effort normal and breath sounds normal. No stridor. No respiratory distress. She has no wheezes. She has no rales.   Abdominal: Soft. Bowel sounds are normal. She exhibits no distension. There is no tenderness. There is no guarding.   Musculoskeletal: Normal range of motion. She exhibits no edema.   Lymphadenopathy:     She has no cervical adenopathy.   Neurological: She is alert and oriented to person, place, and time.   Skin: Skin is warm and dry. Capillary refill takes less than 2 seconds. She is not diaphoretic.   Psychiatric: She has a normal mood and affect. Her speech is normal and behavior is normal. Judgment and thought content normal. Cognition and memory are normal.   Nursing note and vitals reviewed.        Assessment:      1. Attention deficit hyperactivity disorder (ADHD), predominantly inattentive type    2. Vitamin D deficiency    3. Generalized anxiety disorder    4. Insomnia, unspecified type    5. BMI 26.0-26.9,adult          Plan:     Attention deficit hyperactivity disorder (ADHD), predominantly inattentive " type  Comments:  increase dose to 40mg per day  Orders:  -     lisdexamfetamine (VYVANSE) 40 MG Cap; Take 1 capsule (40 mg total) by mouth once daily.  Dispense: 30 capsule; Refill: 0    Vitamin D deficiency  Comments:  cont vitamin    Generalized anxiety disorder  Comments:  stable, cont meds    Insomnia, unspecified type  Comments:  stable, cont meds    BMI 26.0-26.9,adult  Comments:  diet and exercise     Anxiety -  Educated on diagnosis, s/s, worsening s/s, and duration. Educated on medications, s/e, possible adverse reactions, and proper medication administration. Educated on anxiety and stress reduction techniques, such as meditation, deep breathing exercises, relaxing music, warm baths with quiet atmosphere, apps on smart phone to help track and manage symptoms, group therapy (Includes Mosque and Family/Friends Time), Essential Oils, and eliminate stressful or anxiety variables from life if possible. Also educated on coping mechanisms and ways to handle anxiety/stressful situations. Instructed to notify office immediately if s/s persist, worsen, or do not improve. Instructed patient to go straight to ER if suicidal or homicidal ideations occur, and stop taking medication - Patient verbalized understanding.    LA  checked and consistent with pt hx. Will call each month for RF and will send electronically.       RTC in 3 months for check up or sooner if needed.          Current Outpatient Medications:     clonazePAM (KLONOPIN) 1 MG tablet, Take 1 tablet (1 mg total) by mouth 2 (two) times daily as needed for Anxiety., Disp: 60 tablet, Rfl: 2    doxepin (SINEQUAN) 25 MG capsule, TAKE 1 CAPSULE (25 MG TOTAL) BY MOUTH NIGHTLY AS NEEDED (SLEEP)., Disp: 30 capsule, Rfl: 5    ergocalciferol (VITAMIN D2) 50,000 unit Cap, Take 1 capsule (50,000 Units total) by mouth every 7 days., Disp: 12 capsule, Rfl: 3    estradiol (ESTRACE) 1 MG tablet, Take 1 tablet (1 mg total) by mouth once daily., Disp: 90 tablet, Rfl:  1    hydrOXYzine HCl (ATARAX) 25 MG tablet, 1/2 tab - 1 tab TID PRN itching during eczema flare up, Disp: 60 tablet, Rfl: 2    traZODone (DESYREL) 50 MG tablet, TAKE 1 TABLET BY MOUTH EVERYDAY AT BEDTIME, Disp: 90 tablet, Rfl: 1    triamcinolone acetonide 0.1% (KENALOG) 0.1 % cream, Apply topically 2 (two) times daily. No longer than 14 days at a time., Disp: 45 g, Rfl: 2    lisdexamfetamine (VYVANSE) 40 MG Cap, Take 1 capsule (40 mg total) by mouth once daily., Disp: 30 capsule, Rfl: 0    Risks, benefits, and alternatives discussed with patient, Patient verbalized understanding of discussed plan of care. Asked patient if any further questions, answered no.

## 2020-01-16 DIAGNOSIS — F90.0 ATTENTION DEFICIT HYPERACTIVITY DISORDER (ADHD), PREDOMINANTLY INATTENTIVE TYPE: ICD-10-CM

## 2020-01-16 RX ORDER — LISDEXAMFETAMINE DIMESYLATE 40 MG/1
40 CAPSULE ORAL DAILY
Qty: 30 CAPSULE | Refills: 0 | Status: SHIPPED | OUTPATIENT
Start: 2020-01-16 | End: 2020-02-18 | Stop reason: SDUPTHER

## 2020-01-22 DIAGNOSIS — N95.9 MENOPAUSAL DISORDER: ICD-10-CM

## 2020-01-22 RX ORDER — ESTRADIOL 1 MG/1
TABLET ORAL
Qty: 90 TABLET | Refills: 1 | Status: SHIPPED | OUTPATIENT
Start: 2020-01-22 | End: 2020-06-17 | Stop reason: SDUPTHER

## 2020-02-18 DIAGNOSIS — F90.0 ATTENTION DEFICIT HYPERACTIVITY DISORDER (ADHD), PREDOMINANTLY INATTENTIVE TYPE: ICD-10-CM

## 2020-02-18 RX ORDER — LISDEXAMFETAMINE DIMESYLATE 40 MG/1
40 CAPSULE ORAL DAILY
Qty: 30 CAPSULE | Refills: 0 | Status: SHIPPED | OUTPATIENT
Start: 2020-02-18 | End: 2020-03-16 | Stop reason: SDUPTHER

## 2020-03-04 DIAGNOSIS — G47.00 INSOMNIA, UNSPECIFIED TYPE: ICD-10-CM

## 2020-03-04 RX ORDER — DOXEPIN HYDROCHLORIDE 25 MG/1
25 CAPSULE ORAL NIGHTLY PRN
Qty: 30 CAPSULE | Refills: 5 | Status: SHIPPED | OUTPATIENT
Start: 2020-03-04 | End: 2020-08-24

## 2020-03-11 ENCOUNTER — OFFICE VISIT (OUTPATIENT)
Dept: FAMILY MEDICINE | Facility: CLINIC | Age: 55
End: 2020-03-11
Payer: COMMERCIAL

## 2020-03-11 VITALS
WEIGHT: 139 LBS | BODY MASS INDEX: 26.24 KG/M2 | HEIGHT: 61 IN | OXYGEN SATURATION: 100 % | DIASTOLIC BLOOD PRESSURE: 83 MMHG | HEART RATE: 83 BPM | SYSTOLIC BLOOD PRESSURE: 123 MMHG | TEMPERATURE: 97 F

## 2020-03-11 DIAGNOSIS — E55.9 VITAMIN D DEFICIENCY: ICD-10-CM

## 2020-03-11 DIAGNOSIS — F90.0 ATTENTION DEFICIT HYPERACTIVITY DISORDER (ADHD), PREDOMINANTLY INATTENTIVE TYPE: Primary | ICD-10-CM

## 2020-03-11 PROBLEM — F90.9 ATTENTION DEFICIT HYPERACTIVITY DISORDER: Status: ACTIVE | Noted: 2018-08-09

## 2020-03-11 PROBLEM — N63.0 BREAST LUMP: Status: ACTIVE | Noted: 2018-05-14

## 2020-03-11 PROCEDURE — 3008F BODY MASS INDEX DOCD: CPT | Mod: CPTII,S$GLB,, | Performed by: NURSE PRACTITIONER

## 2020-03-11 PROCEDURE — 99213 OFFICE O/P EST LOW 20 MIN: CPT | Mod: S$GLB,,, | Performed by: NURSE PRACTITIONER

## 2020-03-11 PROCEDURE — 3008F PR BODY MASS INDEX (BMI) DOCUMENTED: ICD-10-PCS | Mod: CPTII,S$GLB,, | Performed by: NURSE PRACTITIONER

## 2020-03-11 PROCEDURE — 99213 PR OFFICE/OUTPT VISIT, EST, LEVL III, 20-29 MIN: ICD-10-PCS | Mod: S$GLB,,, | Performed by: NURSE PRACTITIONER

## 2020-03-11 NOTE — PROGRESS NOTES
Clinic Note  3/11/2020      Subjective:       Patient ID:  Isabella is a 54 y.o. female being seen for an established visit.    Chief Complaint: Follow-up and Medication Refill    HPI   Isabella is a 54 year old female in clinic today for 3 month follow up and medication refill. Vyvanse increased to 40 mg at last clinic visit, tolerating well no side effects. Continues to take vitamin D weekly, will recheck levels today.       The following portions of the patient's history were reviewed and updated as appropriate: allergies, current medications, past family history, past medical history, past social history, past surgical history and problem list.    Review of Systems   Constitutional: Negative for chills, fever, malaise/fatigue and weight loss.   Respiratory: Negative for cough, sputum production, shortness of breath and wheezing.    Cardiovascular: Negative for chest pain, palpitations, claudication and leg swelling.   Gastrointestinal: Negative for constipation, diarrhea, nausea and vomiting.   Genitourinary: Negative for dysuria, frequency, hematuria and urgency.   Psychiatric/Behavioral: The patient is nervous/anxious and has insomnia.         Chronic, stable on medications       Medication List with Changes/Refills   Current Medications    CLONAZEPAM (KLONOPIN) 1 MG TABLET    Take 1 tablet (1 mg total) by mouth 2 (two) times daily as needed for Anxiety.    DOXEPIN (SINEQUAN) 25 MG CAPSULE    Take 1 capsule (25 mg total) by mouth nightly as needed (sleep).    ERGOCALCIFEROL (VITAMIN D2) 50,000 UNIT CAP    Take 1 capsule (50,000 Units total) by mouth every 7 days.    ESTRADIOL (ESTRACE) 1 MG TABLET    TAKE 1 TABLET BY MOUTH EVERY DAY    HYDROXYZINE HCL (ATARAX) 25 MG TABLET    1/2 tab - 1 tab TID PRN itching during eczema flare up    LISDEXAMFETAMINE (VYVANSE) 40 MG CAP    Take 1 capsule (40 mg total) by mouth once daily.    TRAZODONE (DESYREL) 50 MG TABLET    TAKE 1 TABLET BY MOUTH EVERYDAY AT BEDTIME     "TRIAMCINOLONE ACETONIDE 0.1% (KENALOG) 0.1 % CREAM    Apply topically 2 (two) times daily. No longer than 14 days at a time.       Patient Active Problem List   Diagnosis    Attention deficit hyperactivity disorder    Breast lump           Objective:      /83 (BP Location: Left arm, Patient Position: Sitting, BP Method: Medium (Automatic))   Pulse 83   Temp 97.4 °F (36.3 °C)   Ht 5' 1" (1.549 m)   Wt 63 kg (139 lb)   SpO2 100%   BMI 26.26 kg/m²   Estimated body mass index is 26.26 kg/m² as calculated from the following:    Height as of this encounter: 5' 1" (1.549 m).    Weight as of this encounter: 63 kg (139 lb).  Physical Exam   Constitutional: She is oriented to person, place, and time and well-developed, well-nourished, and in no distress. Vital signs are normal. She appears to not be writhing in pain. She appears healthy. She does not have a sickly appearance. No distress.   Cardiovascular: Normal rate, regular rhythm and normal heart sounds. Exam reveals no friction rub.   No murmur heard.  Pulmonary/Chest: Effort normal and breath sounds normal. No accessory muscle usage. No bradypnea. No respiratory distress. She has no decreased breath sounds. She has no wheezes. She has no rales.   Neurological: She is alert and oriented to person, place, and time. Gait normal. GCS score is 15.   Skin: She is not diaphoretic.   Psychiatric: Mood, memory, affect and judgment normal. Her mood appears not anxious. She does not exhibit a depressed mood. She expresses no homicidal and no suicidal ideation. She expresses no suicidal plans and no homicidal plans.   Nursing note and vitals reviewed.        Assessment and Plan:   ADHD, predominately inattentive, chronic, stable on current medication  Refill Vyvance 40 mg  Vitamin d def, chronic, stable  Recheck Vit D   Will call patient with results  Follow up in 3 months and as needed      Problem List Items Addressed This Visit        Psychiatric    Attention " deficit hyperactivity disorder - Primary      Other Visit Diagnoses     Vitamin D deficiency        Relevant Orders    Vitamin D        Risks, benefits, and alternatives discussed with patient, Patient verbalized understanding of discussed plan of care. Asked patient if any further questions, answered no.  Follow Up:   Follow up in about 3 months (around 6/11/2020), or if symptoms worsen or fail to improve.    Other Orders Placed This Visit:  Orders Placed This Encounter   Procedures    Vitamin D         Mehnaz Pope    Problem List Items Addressed This Visit        Psychiatric    Attention deficit hyperactivity disorder - Primary      Other Visit Diagnoses     Vitamin D deficiency        Relevant Orders    Vitamin D

## 2020-03-12 LAB — VITAMIN D (25OHD): 52 NG/ML

## 2020-03-16 ENCOUNTER — PATIENT MESSAGE (OUTPATIENT)
Dept: FAMILY MEDICINE | Facility: CLINIC | Age: 55
End: 2020-03-16

## 2020-03-16 DIAGNOSIS — F90.0 ATTENTION DEFICIT HYPERACTIVITY DISORDER (ADHD), PREDOMINANTLY INATTENTIVE TYPE: ICD-10-CM

## 2020-03-16 RX ORDER — LISDEXAMFETAMINE DIMESYLATE 40 MG/1
40 CAPSULE ORAL DAILY
Qty: 30 CAPSULE | Refills: 0 | Status: SHIPPED | OUTPATIENT
Start: 2020-03-16 | End: 2020-03-16 | Stop reason: SDUPTHER

## 2020-03-16 RX ORDER — LISDEXAMFETAMINE DIMESYLATE 40 MG/1
40 CAPSULE ORAL DAILY
Qty: 30 CAPSULE | Refills: 0 | Status: CANCELLED | OUTPATIENT
Start: 2020-03-16

## 2020-03-16 RX ORDER — LISDEXAMFETAMINE DIMESYLATE 40 MG/1
40 CAPSULE ORAL DAILY
Qty: 30 CAPSULE | Refills: 0 | Status: SHIPPED | OUTPATIENT
Start: 2020-03-16 | End: 2020-04-20 | Stop reason: SDUPTHER

## 2020-04-16 DIAGNOSIS — F90.0 ATTENTION DEFICIT HYPERACTIVITY DISORDER (ADHD), PREDOMINANTLY INATTENTIVE TYPE: ICD-10-CM

## 2020-04-16 RX ORDER — LISDEXAMFETAMINE DIMESYLATE 40 MG/1
40 CAPSULE ORAL DAILY
Qty: 30 CAPSULE | Refills: 0 | OUTPATIENT
Start: 2020-04-16

## 2020-04-16 NOTE — TELEPHONE ENCOUNTER
Patient was called and informed that she would need a virtual visit with Dr Ott in order to get her vyvanse refilled

## 2020-04-20 DIAGNOSIS — F90.0 ATTENTION DEFICIT HYPERACTIVITY DISORDER (ADHD), PREDOMINANTLY INATTENTIVE TYPE: ICD-10-CM

## 2020-04-20 RX ORDER — LISDEXAMFETAMINE DIMESYLATE 40 MG/1
40 CAPSULE ORAL DAILY
Qty: 30 CAPSULE | Refills: 0 | Status: SHIPPED | OUTPATIENT
Start: 2020-04-20 | End: 2020-06-17

## 2020-04-20 NOTE — TELEPHONE ENCOUNTER
----- Message from Enriqueta Dixon sent at 4/20/2020 11:03 AM CDT -----  Contact: Patient  Patient calling  For refill lisdexamfetamine (VYVANSE) 40 MG Cap Call back number [phone]. Tks

## 2020-04-20 NOTE — TELEPHONE ENCOUNTER
Preferred Name:   Isabella Santana  Female, 54 y.o., 1965  Phone:   [phone] (M)  PCP:   BERLIN [place]  Language:   English  Need Interp:   No  Allergies Last Reviewed:   03/16/20  Allergies:   Sulfamethoxazole-trimethoprim,      Tetanus Vaccines And Toxoid,      Sulfa (Sulfonamide Antibiotics)  Health Maintenance:   Due  Primary Ins.:   [employer]  MRN:   [id number]  Pt Comm Pref:   Patient Portal, Mail  Last MyChart Login:   4/20/2020 10:52 AM  Next Appt:   With Family Medicine (Mehnaz Pope NP)  06/10/2020 at 11:30 AM  My Sticky Note:     Specialty Comments:     Message   Received: Today      Refill   Message Contents   Enriqueta Raman Staff   Caller: Patient (Today, 11:03 AM)             Patient calling  For refill lisdexamfetamine (VYVANSE) 40 MG Cap Call back number [phone]. Tks

## 2020-04-21 DIAGNOSIS — Z01.84 ANTIBODY RESPONSE EXAMINATION: ICD-10-CM

## 2020-05-21 DIAGNOSIS — Z01.84 ANTIBODY RESPONSE EXAMINATION: ICD-10-CM

## 2020-06-17 ENCOUNTER — OFFICE VISIT (OUTPATIENT)
Dept: INTERNAL MEDICINE | Facility: CLINIC | Age: 55
End: 2020-06-17
Payer: COMMERCIAL

## 2020-06-17 VITALS
HEIGHT: 61 IN | TEMPERATURE: 98 F | OXYGEN SATURATION: 99 % | WEIGHT: 138.5 LBS | HEART RATE: 64 BPM | SYSTOLIC BLOOD PRESSURE: 120 MMHG | DIASTOLIC BLOOD PRESSURE: 83 MMHG | BODY MASS INDEX: 26.15 KG/M2

## 2020-06-17 DIAGNOSIS — F90.0 ATTENTION DEFICIT HYPERACTIVITY DISORDER (ADHD), PREDOMINANTLY INATTENTIVE TYPE: Primary | ICD-10-CM

## 2020-06-17 DIAGNOSIS — F51.01 PRIMARY INSOMNIA: ICD-10-CM

## 2020-06-17 DIAGNOSIS — N95.9 MENOPAUSAL DISORDER: ICD-10-CM

## 2020-06-17 DIAGNOSIS — F41.1 GENERALIZED ANXIETY DISORDER: ICD-10-CM

## 2020-06-17 PROBLEM — F41.9 ANXIETY: Status: ACTIVE | Noted: 2020-06-17

## 2020-06-17 PROBLEM — F41.9 ANXIETY: Status: RESOLVED | Noted: 2020-06-17 | Resolved: 2020-06-17

## 2020-06-17 PROCEDURE — 99214 PR OFFICE/OUTPT VISIT, EST, LEVL IV, 30-39 MIN: ICD-10-PCS | Mod: S$GLB,,, | Performed by: NURSE PRACTITIONER

## 2020-06-17 PROCEDURE — 3008F PR BODY MASS INDEX (BMI) DOCUMENTED: ICD-10-PCS | Mod: CPTII,S$GLB,, | Performed by: NURSE PRACTITIONER

## 2020-06-17 PROCEDURE — 99214 OFFICE O/P EST MOD 30 MIN: CPT | Mod: S$GLB,,, | Performed by: NURSE PRACTITIONER

## 2020-06-17 PROCEDURE — 3008F BODY MASS INDEX DOCD: CPT | Mod: CPTII,S$GLB,, | Performed by: NURSE PRACTITIONER

## 2020-06-17 RX ORDER — LISDEXAMFETAMINE DIMESYLATE 40 MG/1
40 CAPSULE ORAL DAILY
Qty: 30 CAPSULE | Refills: 0 | Status: SHIPPED | OUTPATIENT
Start: 2020-06-17 | End: 2020-07-08 | Stop reason: SDUPTHER

## 2020-06-17 RX ORDER — ESTRADIOL 1 MG/1
1 TABLET ORAL DAILY
Qty: 90 TABLET | Refills: 1 | Status: SHIPPED | OUTPATIENT
Start: 2020-06-17 | End: 2020-09-16 | Stop reason: SDUPTHER

## 2020-06-17 RX ORDER — CLONAZEPAM 1 MG/1
1 TABLET ORAL 2 TIMES DAILY PRN
Qty: 60 TABLET | Refills: 2 | Status: SHIPPED | OUTPATIENT
Start: 2020-06-17 | End: 2021-03-01 | Stop reason: SDUPTHER

## 2020-06-17 NOTE — PROGRESS NOTES
Answers for HPI/ROS submitted by the patient on 6/16/2020   activity change: Yes  trouble swallowing: No  chest tightness: No  difficulty urinating: No  dysphoric mood: No  Clinic Note  6/17/2020      Subjective:       Patient ID:  Isabella is a 54 y.o. female being seen for an established visit.    Chief Complaint: Follow-up and ADHD    HPI  Isabella is a 54 year old female in clinic today for medication refills.   Insomnia - failed on trazodone, amitriptyline and vistaril. Has also tried otc meds without success. Takes 0.5 mg klonipin as needed for insomnia and anxiety, needs refill.   ADHD - doing well on current dose of med. No cp, palps, chest tightness, BLE swelling. BP well controlled.  Anxiety - takes clonazepam infrequently and anxiety has been well controlled on this.   Vitamin D Def - vitamin D level normal at last visit. Instructed to take OTC vitamin D 5000 units daily.  She is also on HRT and needs refill of estrace tabs. Hx hys and is utd on mmg.      The following portions of the patient's history were reviewed and updated as appropriate: allergies, current medications, past family history, past medical history, past social history, past surgical history and problem list.    Review of Systems   Constitutional: Negative for chills, fever, malaise/fatigue and weight loss.   HENT: Negative for hearing loss.    Eyes: Negative for discharge.   Respiratory: Negative for cough, sputum production, shortness of breath and wheezing.    Cardiovascular: Negative for chest pain and palpitations.   Gastrointestinal: Negative for constipation, diarrhea, nausea and vomiting.   Genitourinary: Negative for dysuria, frequency, hematuria and urgency.   Neurological: Negative for headaches.       Medication List with Changes/Refills   Current Medications    DOXEPIN (SINEQUAN) 25 MG CAPSULE    Take 1 capsule (25 mg total) by mouth nightly as needed (sleep).   Changed and/or Refilled Medications    Modified Medication Previous  "Medication    CLONAZEPAM (KLONOPIN) 1 MG TABLET clonazePAM (KLONOPIN) 1 MG tablet       Take 1 tablet (1 mg total) by mouth 2 (two) times daily as needed for Anxiety.    Take 1 tablet (1 mg total) by mouth 2 (two) times daily as needed for Anxiety.    ESTRADIOL (ESTRACE) 1 MG TABLET estradiol (ESTRACE) 1 MG tablet       Take 1 tablet (1 mg total) by mouth once daily.    TAKE 1 TABLET BY MOUTH EVERY DAY    LISDEXAMFETAMINE (VYVANSE) 40 MG CAP lisdexamfetamine (VYVANSE) 40 MG Cap       Take 1 capsule (40 mg total) by mouth once daily.    Take 1 capsule (40 mg total) by mouth once daily.   Discontinued Medications    ERGOCALCIFEROL (VITAMIN D2) 50,000 UNIT CAP    Take 1 capsule (50,000 Units total) by mouth every 7 days.    HYDROXYZINE HCL (ATARAX) 25 MG TABLET    1/2 tab - 1 tab TID PRN itching during eczema flare up    TRAZODONE (DESYREL) 50 MG TABLET    TAKE 1 TABLET BY MOUTH EVERYDAY AT BEDTIME    TRIAMCINOLONE ACETONIDE 0.1% (KENALOG) 0.1 % CREAM    Apply topically 2 (two) times daily. No longer than 14 days at a time.       Patient Active Problem List   Diagnosis    Attention deficit hyperactivity disorder    Breast lump    Primary insomnia    Menopausal disorder    Generalized anxiety disorder           Objective:      /83 (BP Location: Right arm, Patient Position: Sitting, BP Method: Medium (Automatic))   Pulse 64   Temp 98.2 °F (36.8 °C)   Ht 5' 1" (1.549 m)   Wt 62.8 kg (138 lb 8 oz)   SpO2 99%   BMI 26.17 kg/m²   Estimated body mass index is 26.17 kg/m² as calculated from the following:    Height as of this encounter: 5' 1" (1.549 m).    Weight as of this encounter: 62.8 kg (138 lb 8 oz).  Physical Exam   Constitutional: She is oriented to person, place, and time and well-developed, well-nourished, and in no distress. Vital signs are normal. She appears not dehydrated. She does not have a sickly appearance. No distress.   HENT:   Head: Normocephalic and atraumatic.   Right Ear: Hearing, " tympanic membrane, external ear and ear canal normal.   Left Ear: Hearing, tympanic membrane, external ear and ear canal normal.   Mouth/Throat: Uvula is midline and oropharynx is clear and moist. No oropharyngeal exudate, posterior oropharyngeal edema or posterior oropharyngeal erythema.   Eyes: Conjunctivae are normal. Right eye exhibits no discharge. Left eye exhibits no discharge. No scleral icterus.   Neck: Normal range of motion. Neck supple. No JVD present. No tracheal deviation present.   Cardiovascular: Normal rate and regular rhythm. Exam reveals no friction rub.   No murmur heard.  Pulmonary/Chest: Effort normal and breath sounds normal. No accessory muscle usage. No respiratory distress. She has no decreased breath sounds. She has no wheezes. She has no rales.   Neurological: She is alert and oriented to person, place, and time. GCS score is 15.   Skin: Skin is warm and dry. No rash noted. No erythema.   Psychiatric: Mood, memory, affect and judgment normal.   Nursing note and vitals reviewed.        Assessment and Plan:   Generalized anxiety disorder, chronic, stable on medication  -     clonazePAM (KLONOPIN) 1 MG tablet; Take 1 tablet (1 mg total) by mouth 2 (two) times daily as needed for Anxiety.  Dispense: 60 tablet; Refill: 0     Insomnia, unspecified type, chronic, stable on medications  -     doxepin (SINEQUAN) 25 MG capsule; Take 1 capsule (25 mg total) by mouth nightly as needed (sleep).  Dispense: 30 capsule; Refill: 1     Attention deficit hyperactivity disorder (ADHD), predominantly inattentive type, chronic, stable on medications  - Refill vyvance 40 mg daily     Menopausal disorder  -     estradiol (ESTRACE) 1 MG tablet; Take 1 tablet (1 mg total) by mouth once daily.  Dispense: 90 tablet; Refill: 1    Labs with next visit  Follow up in 3 months and as needed         Problem List Items Addressed This Visit        Psychiatric    Attention deficit hyperactivity disorder - Primary    Relevant  Medications    lisdexamfetamine (VYVANSE) 40 MG Cap    Generalized anxiety disorder    Relevant Medications    clonazePAM (KLONOPIN) 1 MG tablet       Renal/    Menopausal disorder    Relevant Medications    estradioL (ESTRACE) 1 MG tablet       Other    Primary insomnia            Risks, benefits, and alternatives discussed with patient, Patient verbalized understanding of discussed plan of care. Asked patient if any further questions, answered no.  Follow Up:   Follow up in about 3 months (around 9/17/2020), or if symptoms worsen or fail to improve.    Other Orders Placed This Visit:  No orders of the defined types were placed in this encounter.        Mehnaz Pope    Problem List Items Addressed This Visit        Psychiatric    Attention deficit hyperactivity disorder - Primary    Relevant Medications    lisdexamfetamine (VYVANSE) 40 MG Cap    Generalized anxiety disorder    Relevant Medications    clonazePAM (KLONOPIN) 1 MG tablet       Renal/    Menopausal disorder    Relevant Medications    estradioL (ESTRACE) 1 MG tablet       Other    Primary insomnia

## 2020-06-20 DIAGNOSIS — Z01.84 ANTIBODY RESPONSE EXAMINATION: ICD-10-CM

## 2020-07-08 DIAGNOSIS — F90.0 ATTENTION DEFICIT HYPERACTIVITY DISORDER (ADHD), PREDOMINANTLY INATTENTIVE TYPE: ICD-10-CM

## 2020-07-09 RX ORDER — LISDEXAMFETAMINE DIMESYLATE 40 MG/1
40 CAPSULE ORAL DAILY
Qty: 30 CAPSULE | Refills: 0 | Status: SHIPPED | OUTPATIENT
Start: 2020-07-09 | End: 2020-08-10 | Stop reason: SDUPTHER

## 2020-07-20 DIAGNOSIS — Z01.84 ANTIBODY RESPONSE EXAMINATION: ICD-10-CM

## 2020-08-19 DIAGNOSIS — Z01.84 ANTIBODY RESPONSE EXAMINATION: ICD-10-CM

## 2020-09-14 DIAGNOSIS — F90.0 ATTENTION DEFICIT HYPERACTIVITY DISORDER (ADHD), PREDOMINANTLY INATTENTIVE TYPE: ICD-10-CM

## 2020-09-15 RX ORDER — LISDEXAMFETAMINE DIMESYLATE 40 MG/1
40 CAPSULE ORAL DAILY
Qty: 30 CAPSULE | Refills: 0 | Status: SHIPPED | OUTPATIENT
Start: 2020-09-15 | End: 2020-09-16 | Stop reason: SDUPTHER

## 2020-09-15 NOTE — PROGRESS NOTES
Clinic Note  9/16/2020      Subjective:       Patient ID:  Isabella is a 54 y.o. female being seen for an established visit.    Chief Complaint: ADHD    HPI  Isabella is a 54 year old female in clinic today for medication refills on vyvanse.     Insomnia - failed on trazodone, amitriptyline and vistaril. Has also tried otc meds without success. Takes 0.5 mg klonipin as needed for insomnia and anxiety, needs refill.   ADHD - doing well on current dose of vyvanse. No cp, palps, chest tightness, BLE swelling. BP well controlled.  Anxiety - takes clonazepam infrequently and anxiety has been well controlled on this.   Vitamin D Def - vitamin D level normal at last visit. Instructed to take OTC vitamin D 5000 units daily.  She is also on HRT and needs refill of estrace tabs. Hx hys and is utd on mmg.     The following portions of the patient's history were reviewed and updated as appropriate: allergies, current medications, past family history, past medical history, past social history, past surgical history and problem list.    Review of Systems   Constitutional: Negative for chills, fever, malaise/fatigue and weight loss.   Respiratory: Negative for cough, sputum production, shortness of breath and wheezing.    Cardiovascular: Negative for chest pain, palpitations, claudication and leg swelling.   Genitourinary: Negative for dysuria, frequency, hematuria and urgency.   Psychiatric/Behavioral: Positive for depression. The patient is nervous/anxious and has insomnia.        Medication List with Changes/Refills   Current Medications    CLONAZEPAM (KLONOPIN) 1 MG TABLET    Take 1 tablet (1 mg total) by mouth 2 (two) times daily as needed for Anxiety.    DOXEPIN (SINEQUAN) 25 MG CAPSULE    TAKE 1 CAPSULE BY MOUTH EVERY DAY AT BEDTIME AS NEEDED SLEEP   Changed and/or Refilled Medications    Modified Medication Previous Medication    ESTRADIOL (ESTRACE) 1 MG TABLET estradioL (ESTRACE) 1 MG tablet       Take 1 tablet (1 mg total)  "by mouth once daily.    Take 1 tablet (1 mg total) by mouth once daily.    LISDEXAMFETAMINE (VYVANSE) 40 MG CAP lisdexamfetamine (VYVANSE) 40 MG Cap       Take 1 capsule (40 mg total) by mouth once daily.    Take 1 capsule (40 mg total) by mouth once daily.       Patient Active Problem List   Diagnosis    Attention deficit hyperactivity disorder    Breast lump    Primary insomnia    Menopausal disorder    Generalized anxiety disorder           Objective:      /85 (BP Location: Left arm, Patient Position: Sitting, BP Method: Medium (Automatic))   Pulse 74   Temp 97.4 °F (36.3 °C) (Temporal)   Resp 16   Ht 5' 1" (1.549 m)   Wt 62.6 kg (138 lb)   SpO2 (!) 94%   BMI 26.07 kg/m²   Estimated body mass index is 26.07 kg/m² as calculated from the following:    Height as of this encounter: 5' 1" (1.549 m).    Weight as of this encounter: 62.6 kg (138 lb).  Physical Exam   Constitutional: She is oriented to person, place, and time and well-developed, well-nourished, and in no distress. Vital signs are normal. She appears healthy. She does not have a sickly appearance. No distress.   Neck: Normal range of motion. Neck supple.   Cardiovascular: Normal rate, regular rhythm and normal heart sounds. Exam reveals no gallop, no distant heart sounds and no friction rub.   No murmur heard.  Pulmonary/Chest: Effort normal and breath sounds normal. No accessory muscle usage. No respiratory distress. She has no decreased breath sounds. She has no wheezes.   Abdominal: Soft. Bowel sounds are normal. She exhibits no distension. There is no abdominal tenderness.   Neurological: She is alert and oriented to person, place, and time. Gait normal. GCS score is 15.   Skin: Skin is warm and dry. No rash noted. She is not diaphoretic. No erythema.   Psychiatric: Mood, memory, affect and judgment normal.   Nursing note and vitals reviewed.        Assessment and Plan:       Generalized anxiety disorder, chronic, stable on " medication  -     clonazePAM (KLONOPIN) 1 MG tablet; Take 1 tablet (1 mg total) by mouth 2 (two) times daily as needed for Anxiety.  Dispense: 60 tablet; Refill: 0     Insomnia, unspecified type, chronic, stable on medications  -     doxepin (SINEQUAN) 25 MG capsule; Take 1 capsule (25 mg total) by mouth nightly as needed (sleep).  Dispense: 30 capsule; Refill: 1     Attention deficit hyperactivity disorder (ADHD), predominantly inattentive type, chronic, stable on medications  - Refill vyvance 40 mg daily     Menopausal disorder  -     estradiol (ESTRACE) 1 MG tablet; Take 1 tablet (1 mg total) by mouth once daily.  Dispense: 90 tablet; Refill: 1     Routine labs ordered, will call with results  Follow up in 3 months and as needed     Problem List Items Addressed This Visit        Psychiatric    Attention deficit hyperactivity disorder    Relevant Medications    lisdexamfetamine (VYVANSE) 40 MG Cap       Renal/    Menopausal disorder    Relevant Medications    estradioL (ESTRACE) 1 MG tablet      Other Visit Diagnoses     Vitamin D deficiency    -  Primary    Relevant Orders    Vitamin D    Laboratory exam ordered as part of routine general medical examination        Relevant Orders    Lipid Panel    CBC auto differential    TSH w/reflex to FT4    Comprehensive metabolic panel            Risks, benefits, and alternatives discussed with patient, Patient verbalized understanding of discussed plan of care. Asked patient if any further questions, answered no.  Follow Up:   Follow up in about 3 months (around 12/16/2020), or if symptoms worsen or fail to improve.    Other Orders Placed This Visit:  Orders Placed This Encounter   Procedures    Vitamin D    Lipid Panel    CBC auto differential    TSH w/reflex to FT4    Comprehensive metabolic panel         Mehnaz Pope    Problem List Items Addressed This Visit        Psychiatric    Attention deficit hyperactivity disorder    Relevant Medications     lisdexamfetamine (VYVANSE) 40 MG Cap       Renal/    Menopausal disorder    Relevant Medications    estradioL (ESTRACE) 1 MG tablet      Other Visit Diagnoses     Vitamin D deficiency    -  Primary    Relevant Orders    Vitamin D    Laboratory exam ordered as part of routine general medical examination        Relevant Orders    Lipid Panel    CBC auto differential    TSH w/reflex to FT4    Comprehensive metabolic panel

## 2020-09-16 ENCOUNTER — OFFICE VISIT (OUTPATIENT)
Dept: INTERNAL MEDICINE | Facility: CLINIC | Age: 55
End: 2020-09-16
Payer: COMMERCIAL

## 2020-09-16 VITALS
HEART RATE: 74 BPM | RESPIRATION RATE: 16 BRPM | BODY MASS INDEX: 26.06 KG/M2 | WEIGHT: 138 LBS | TEMPERATURE: 97 F | DIASTOLIC BLOOD PRESSURE: 85 MMHG | OXYGEN SATURATION: 94 % | SYSTOLIC BLOOD PRESSURE: 131 MMHG | HEIGHT: 61 IN

## 2020-09-16 DIAGNOSIS — N95.9 MENOPAUSAL DISORDER: ICD-10-CM

## 2020-09-16 DIAGNOSIS — Z00.00 LABORATORY EXAM ORDERED AS PART OF ROUTINE GENERAL MEDICAL EXAMINATION: ICD-10-CM

## 2020-09-16 DIAGNOSIS — F90.0 ATTENTION DEFICIT HYPERACTIVITY DISORDER (ADHD), PREDOMINANTLY INATTENTIVE TYPE: Primary | ICD-10-CM

## 2020-09-16 DIAGNOSIS — E55.9 VITAMIN D DEFICIENCY: ICD-10-CM

## 2020-09-16 PROCEDURE — 99213 OFFICE O/P EST LOW 20 MIN: CPT | Mod: S$GLB,,, | Performed by: NURSE PRACTITIONER

## 2020-09-16 PROCEDURE — 3008F PR BODY MASS INDEX (BMI) DOCUMENTED: ICD-10-PCS | Mod: CPTII,S$GLB,, | Performed by: NURSE PRACTITIONER

## 2020-09-16 PROCEDURE — 3008F BODY MASS INDEX DOCD: CPT | Mod: CPTII,S$GLB,, | Performed by: NURSE PRACTITIONER

## 2020-09-16 PROCEDURE — 99213 PR OFFICE/OUTPT VISIT, EST, LEVL III, 20-29 MIN: ICD-10-PCS | Mod: S$GLB,,, | Performed by: NURSE PRACTITIONER

## 2020-09-16 RX ORDER — ESTRADIOL 1 MG/1
1 TABLET ORAL DAILY
Qty: 90 TABLET | Refills: 1 | Status: SHIPPED | OUTPATIENT
Start: 2020-09-16 | End: 2021-03-24

## 2020-09-16 RX ORDER — LISDEXAMFETAMINE DIMESYLATE 40 MG/1
40 CAPSULE ORAL DAILY
Qty: 30 CAPSULE | Refills: 0 | Status: SHIPPED | OUTPATIENT
Start: 2020-09-16 | End: 2020-09-18 | Stop reason: SDUPTHER

## 2020-09-18 ENCOUNTER — PATIENT MESSAGE (OUTPATIENT)
Dept: INTERNAL MEDICINE | Facility: CLINIC | Age: 55
End: 2020-09-18

## 2020-09-18 DIAGNOSIS — F90.0 ATTENTION DEFICIT HYPERACTIVITY DISORDER (ADHD), PREDOMINANTLY INATTENTIVE TYPE: ICD-10-CM

## 2020-09-18 DIAGNOSIS — Z01.84 ANTIBODY RESPONSE EXAMINATION: ICD-10-CM

## 2020-09-18 RX ORDER — LISDEXAMFETAMINE DIMESYLATE 40 MG/1
40 CAPSULE ORAL DAILY
Qty: 30 CAPSULE | Refills: 0 | Status: SHIPPED | OUTPATIENT
Start: 2020-09-18 | End: 2020-10-16 | Stop reason: SDUPTHER

## 2020-10-16 ENCOUNTER — PATIENT MESSAGE (OUTPATIENT)
Dept: FAMILY MEDICINE | Facility: CLINIC | Age: 55
End: 2020-10-16

## 2020-10-16 DIAGNOSIS — F90.0 ATTENTION DEFICIT HYPERACTIVITY DISORDER (ADHD), PREDOMINANTLY INATTENTIVE TYPE: ICD-10-CM

## 2020-10-16 RX ORDER — LISDEXAMFETAMINE DIMESYLATE 40 MG/1
40 CAPSULE ORAL DAILY
Qty: 30 CAPSULE | Refills: 0 | Status: SHIPPED | OUTPATIENT
Start: 2020-10-16 | End: 2020-11-18 | Stop reason: SDUPTHER

## 2020-10-18 DIAGNOSIS — Z01.84 ANTIBODY RESPONSE EXAMINATION: ICD-10-CM

## 2020-10-21 ENCOUNTER — PATIENT MESSAGE (OUTPATIENT)
Dept: FAMILY MEDICINE | Facility: CLINIC | Age: 55
End: 2020-10-21

## 2020-11-17 DIAGNOSIS — Z01.84 ANTIBODY RESPONSE EXAMINATION: ICD-10-CM

## 2020-11-18 ENCOUNTER — PATIENT MESSAGE (OUTPATIENT)
Dept: FAMILY MEDICINE | Facility: CLINIC | Age: 55
End: 2020-11-18

## 2020-11-18 DIAGNOSIS — F90.0 ATTENTION DEFICIT HYPERACTIVITY DISORDER (ADHD), PREDOMINANTLY INATTENTIVE TYPE: ICD-10-CM

## 2020-11-18 RX ORDER — LISDEXAMFETAMINE DIMESYLATE 40 MG/1
40 CAPSULE ORAL DAILY
Qty: 30 CAPSULE | Refills: 0 | Status: SHIPPED | OUTPATIENT
Start: 2020-11-18 | End: 2020-12-17 | Stop reason: SDUPTHER

## 2020-12-17 ENCOUNTER — PATIENT MESSAGE (OUTPATIENT)
Dept: FAMILY MEDICINE | Facility: CLINIC | Age: 55
End: 2020-12-17

## 2020-12-17 DIAGNOSIS — F90.0 ATTENTION DEFICIT HYPERACTIVITY DISORDER (ADHD), PREDOMINANTLY INATTENTIVE TYPE: ICD-10-CM

## 2020-12-17 NOTE — TELEPHONE ENCOUNTER
Pt is requesting a refill on her vyvanse. Pt states she couldn't come in this month because her mom had been very ill and passed away. She has a apt in January.

## 2020-12-18 RX ORDER — LISDEXAMFETAMINE DIMESYLATE 40 MG/1
40 CAPSULE ORAL DAILY
Qty: 30 CAPSULE | Refills: 0 | Status: SHIPPED | OUTPATIENT
Start: 2020-12-18 | End: 2021-01-05 | Stop reason: SDUPTHER

## 2020-12-23 ENCOUNTER — TELEPHONE (OUTPATIENT)
Dept: PRIMARY CARE CLINIC | Facility: OTHER | Age: 55
End: 2020-12-23

## 2020-12-23 ENCOUNTER — TELEPHONE (OUTPATIENT)
Dept: FAMILY MEDICINE | Facility: CLINIC | Age: 55
End: 2020-12-23

## 2020-12-23 ENCOUNTER — CLINICAL SUPPORT (OUTPATIENT)
Dept: FAMILY MEDICINE | Facility: CLINIC | Age: 55
End: 2020-12-23
Payer: COMMERCIAL

## 2020-12-23 DIAGNOSIS — R05.9 COUGH: ICD-10-CM

## 2020-12-23 DIAGNOSIS — R05.9 COUGH: Primary | ICD-10-CM

## 2020-12-23 DIAGNOSIS — J34.89 SINUS DRAINAGE: ICD-10-CM

## 2020-12-23 DIAGNOSIS — J06.9 URI WITH COUGH AND CONGESTION: Primary | ICD-10-CM

## 2020-12-23 LAB
CTP QC/QA: YES
SARS-COV-2 RDRP RESP QL NAA+PROBE: NEGATIVE

## 2020-12-23 PROCEDURE — U0002: ICD-10-PCS | Mod: QW,S$GLB,, | Performed by: INTERNAL MEDICINE

## 2020-12-23 PROCEDURE — U0002 COVID-19 LAB TEST NON-CDC: HCPCS | Mod: QW,S$GLB,, | Performed by: INTERNAL MEDICINE

## 2020-12-23 RX ORDER — PREDNISONE 10 MG/1
TABLET ORAL
Qty: 21 TABLET | Refills: 0 | Status: SHIPPED | OUTPATIENT
Start: 2020-12-23 | End: 2021-01-05

## 2020-12-23 RX ORDER — PROMETHAZINE HYDROCHLORIDE AND CODEINE PHOSPHATE 6.25; 1 MG/5ML; MG/5ML
5 SOLUTION ORAL EVERY 6 HOURS PRN
Qty: 118 ML | Refills: 0 | Status: SHIPPED | OUTPATIENT
Start: 2020-12-23 | End: 2021-01-02

## 2020-12-23 RX ORDER — AZITHROMYCIN 250 MG/1
TABLET, FILM COATED ORAL
Qty: 6 TABLET | Refills: 0 | Status: SHIPPED | OUTPATIENT
Start: 2020-12-23 | End: 2020-12-28

## 2020-12-23 NOTE — TELEPHONE ENCOUNTER
----- Message from Debora Ennis sent at 12/23/2020  8:50 AM CST -----  .Type:  Needs Medical Advice    Who Called: self  Symptoms (please be specific): congesting, ears ringing, sinus, occasional cough (tested negative for covid)  How long has patient had these symptoms:  10 days  Pharmacy name and phone #:  will let nurse know  Would the patient rather a call back or a response via [facility]? call  Best Call Back Number: .[phone]  Additional Information:

## 2020-12-23 NOTE — TELEPHONE ENCOUNTER
Patient been feeling bad X 10 days. States that she's been tested for CoVid 3 times during those 10 days. Last test today was negative. However, the patient states that she does not feel well and it's progressively getting worse. OTC cold and cough meds not helping.     * Please send rx to [facility] in [place], [place].

## 2021-01-05 ENCOUNTER — OFFICE VISIT (OUTPATIENT)
Dept: FAMILY MEDICINE | Facility: CLINIC | Age: 56
End: 2021-01-05
Payer: COMMERCIAL

## 2021-01-05 VITALS
HEART RATE: 84 BPM | BODY MASS INDEX: 24.96 KG/M2 | TEMPERATURE: 98 F | SYSTOLIC BLOOD PRESSURE: 123 MMHG | DIASTOLIC BLOOD PRESSURE: 85 MMHG | HEIGHT: 61 IN | RESPIRATION RATE: 16 BRPM | WEIGHT: 132.19 LBS | OXYGEN SATURATION: 96 %

## 2021-01-05 DIAGNOSIS — G47.62 NOCTURNAL LEG CRAMPS: ICD-10-CM

## 2021-01-05 DIAGNOSIS — F90.0 ATTENTION DEFICIT HYPERACTIVITY DISORDER (ADHD), PREDOMINANTLY INATTENTIVE TYPE: Primary | ICD-10-CM

## 2021-01-05 DIAGNOSIS — Z00.00 LABORATORY EXAM ORDERED AS PART OF ROUTINE GENERAL MEDICAL EXAMINATION: ICD-10-CM

## 2021-01-05 DIAGNOSIS — E55.9 VITAMIN D DEFICIENCY: ICD-10-CM

## 2021-01-05 PROCEDURE — 3008F BODY MASS INDEX DOCD: CPT | Mod: CPTII,S$GLB,, | Performed by: NURSE PRACTITIONER

## 2021-01-05 PROCEDURE — 3008F PR BODY MASS INDEX (BMI) DOCUMENTED: ICD-10-PCS | Mod: CPTII,S$GLB,, | Performed by: NURSE PRACTITIONER

## 2021-01-05 PROCEDURE — 99213 OFFICE O/P EST LOW 20 MIN: CPT | Mod: S$GLB,,, | Performed by: NURSE PRACTITIONER

## 2021-01-05 PROCEDURE — 99213 PR OFFICE/OUTPT VISIT, EST, LEVL III, 20-29 MIN: ICD-10-PCS | Mod: S$GLB,,, | Performed by: NURSE PRACTITIONER

## 2021-01-05 RX ORDER — LISDEXAMFETAMINE DIMESYLATE 40 MG/1
40 CAPSULE ORAL DAILY
Qty: 30 CAPSULE | Refills: 0 | Status: SHIPPED | OUTPATIENT
Start: 2021-01-05 | End: 2021-03-01 | Stop reason: SDUPTHER

## 2021-01-07 LAB
ABS NRBC COUNT: 0 X 10 3/UL (ref 0–0.01)
ABSOLUTE BASOPHIL: 0.02 X 10 3/UL (ref 0–0.22)
ABSOLUTE EOSINOPHIL: 0.03 X 10 3/UL (ref 0.04–0.54)
ABSOLUTE IMMATURE GRAN: 0.01 X 10 3/UL (ref 0–0.04)
ABSOLUTE LYMPHOCYTE: 1.57 X 10 3/UL (ref 0.86–4.75)
ABSOLUTE MONOCYTE: 0.27 X 10 3/UL (ref 0.22–1.08)
ALBUMIN SERPL-MCNC: 4.6 G/DL (ref 3.5–5.2)
ALBUMIN/GLOB SERPL ELPH: 1.8 {RATIO} (ref 1–2.7)
ALP ISOS SERPL LEV INH-CCNC: 58 U/L (ref 35–105)
ALT (SGPT): 11 U/L (ref 0–33)
ANION GAP SERPL CALC-SCNC: 9 MMOL/L (ref 8–17)
AST SERPL-CCNC: 12 U/L (ref 0–32)
BASOPHILS NFR BLD: 0.4 % (ref 0.2–1.2)
BILIRUBIN, TOTAL: 0.32 MG/DL (ref 0–1.2)
BUN/CREAT SERPL: 14.1 (ref 6–20)
CALCIUM SERPL-MCNC: 9.5 MG/DL (ref 8.6–10.2)
CARBON DIOXIDE, CO2: 28 MMOL/L (ref 22–29)
CHLORIDE: 106 MMOL/L (ref 98–107)
CHOLEST SERPL-MSCNC: 253 MG/DL (ref 100–200)
CREAT SERPL-MCNC: 0.7 MG/DL (ref 0.5–0.9)
EOSINOPHIL NFR BLD: 0.6 % (ref 0.7–7)
GFR ESTIMATION: 86.25
GLOBULIN: 2.6 G/DL (ref 1.5–4.5)
GLUCOSE: 86 MG/DL (ref 74–106)
HCT VFR BLD AUTO: 40 % (ref 37–47)
HDLC SERPL-MCNC: 113 MG/DL
HGB BLD-MCNC: 12.3 G/DL (ref 12–16)
IMMATURE GRANULOCYTES: 0.2 % (ref 0–0.5)
LDL/HDL RATIO: 1 (ref 1–3)
LDLC SERPL CALC-MCNC: 110.8 MG/DL (ref 0–100)
LYMPHOCYTES NFR BLD: 30.1 % (ref 19.3–53.1)
MAGNESIUM SERPL-MCNC: 2.1 MG/DL (ref 1.6–2.4)
MCH RBC QN AUTO: 26.6 PG (ref 27–32)
MCHC RBC AUTO-ENTMCNC: 30.8 G/DL (ref 32–36)
MCV RBC AUTO: 86.4 FL (ref 82–100)
MONOCYTES NFR BLD: 5.2 % (ref 4.7–12.5)
NEUTROPHILS # BLD AUTO: 3.31 X 10 3/UL (ref 2.15–7.56)
NEUTROPHILS NFR BLD: 63.5 %
NUCLEATED RED BLOOD CELLS: 0 /100 WBC (ref 0–0.2)
PLATELET # BLD AUTO: 293 X 10 3/UL (ref 135–400)
POTASSIUM: 4.6 MMOL/L (ref 3.5–5.1)
PROT SNV-MCNC: 7.2 G/DL (ref 6.4–8.3)
RBC # BLD AUTO: 4.63 X 10 6/UL (ref 4.2–5.4)
RDW-SD: 45.6 FL (ref 37–54)
SODIUM: 143 MMOL/L (ref 136–145)
TRIGL SERPL-MCNC: 146 MG/DL (ref 0–150)
TSH W/REFLEX TO FT4: 1.62 UIU/ML (ref 0.27–4.2)
UREA NITROGEN (BUN): 9.9 MG/DL (ref 6–20)
VITAMIN D (25OHD): 33.8 NG/ML
WBC # BLD: 5.21 X 10 3/UL (ref 4.3–10.8)

## 2021-02-09 ENCOUNTER — PATIENT MESSAGE (OUTPATIENT)
Dept: FAMILY MEDICINE | Facility: CLINIC | Age: 56
End: 2021-02-09

## 2021-03-01 ENCOUNTER — PATIENT MESSAGE (OUTPATIENT)
Dept: FAMILY MEDICINE | Facility: CLINIC | Age: 56
End: 2021-03-01

## 2021-03-24 DIAGNOSIS — N95.9 MENOPAUSAL DISORDER: ICD-10-CM

## 2021-03-24 RX ORDER — ESTRADIOL 1 MG/1
TABLET ORAL
Qty: 90 TABLET | Refills: 1 | Status: SHIPPED | OUTPATIENT
Start: 2021-03-24 | End: 2021-10-25 | Stop reason: SDUPTHER

## 2021-03-29 DIAGNOSIS — F90.0 ATTENTION DEFICIT HYPERACTIVITY DISORDER (ADHD), PREDOMINANTLY INATTENTIVE TYPE: ICD-10-CM

## 2021-03-29 RX ORDER — LISDEXAMFETAMINE DIMESYLATE 40 MG/1
40 CAPSULE ORAL DAILY
Qty: 30 CAPSULE | Refills: 0 | Status: SHIPPED | OUTPATIENT
Start: 2021-03-29 | End: 2021-04-30 | Stop reason: SDUPTHER

## 2021-04-30 ENCOUNTER — PATIENT MESSAGE (OUTPATIENT)
Dept: FAMILY MEDICINE | Facility: CLINIC | Age: 56
End: 2021-04-30

## 2021-04-30 DIAGNOSIS — F90.0 ATTENTION DEFICIT HYPERACTIVITY DISORDER (ADHD), PREDOMINANTLY INATTENTIVE TYPE: ICD-10-CM

## 2021-05-03 RX ORDER — LISDEXAMFETAMINE DIMESYLATE 40 MG/1
40 CAPSULE ORAL DAILY
Qty: 30 CAPSULE | Refills: 0 | Status: SHIPPED | OUTPATIENT
Start: 2021-05-03 | End: 2021-06-02 | Stop reason: SDUPTHER

## 2021-06-02 ENCOUNTER — OFFICE VISIT (OUTPATIENT)
Dept: PRIMARY CARE CLINIC | Facility: CLINIC | Age: 56
End: 2021-06-02
Payer: COMMERCIAL

## 2021-06-02 ENCOUNTER — TELEPHONE (OUTPATIENT)
Dept: PRIMARY CARE CLINIC | Facility: CLINIC | Age: 56
End: 2021-06-02

## 2021-06-02 VITALS
HEIGHT: 61 IN | OXYGEN SATURATION: 100 % | SYSTOLIC BLOOD PRESSURE: 108 MMHG | WEIGHT: 125 LBS | BODY MASS INDEX: 23.6 KG/M2 | RESPIRATION RATE: 18 BRPM | DIASTOLIC BLOOD PRESSURE: 68 MMHG | HEART RATE: 68 BPM

## 2021-06-02 DIAGNOSIS — N95.9 MENOPAUSAL DISORDER: ICD-10-CM

## 2021-06-02 DIAGNOSIS — F90.0 ATTENTION DEFICIT HYPERACTIVITY DISORDER (ADHD), PREDOMINANTLY INATTENTIVE TYPE: ICD-10-CM

## 2021-06-02 DIAGNOSIS — F41.1 GENERALIZED ANXIETY DISORDER: Primary | Chronic | ICD-10-CM

## 2021-06-02 DIAGNOSIS — Z12.11 SCREENING FOR COLON CANCER: ICD-10-CM

## 2021-06-02 PROCEDURE — 99214 PR OFFICE/OUTPT VISIT, EST, LEVL IV, 30-39 MIN: ICD-10-PCS | Mod: S$GLB,,, | Performed by: NURSE PRACTITIONER

## 2021-06-02 PROCEDURE — 99214 OFFICE O/P EST MOD 30 MIN: CPT | Mod: S$GLB,,, | Performed by: NURSE PRACTITIONER

## 2021-06-02 PROCEDURE — 3008F BODY MASS INDEX DOCD: CPT | Mod: CPTII,S$GLB,, | Performed by: NURSE PRACTITIONER

## 2021-06-02 PROCEDURE — 3008F PR BODY MASS INDEX (BMI) DOCUMENTED: ICD-10-PCS | Mod: CPTII,S$GLB,, | Performed by: NURSE PRACTITIONER

## 2021-06-02 RX ORDER — LISDEXAMFETAMINE DIMESYLATE 40 MG/1
40 CAPSULE ORAL DAILY
Qty: 30 CAPSULE | Refills: 0 | Status: SHIPPED | OUTPATIENT
Start: 2021-06-02 | End: 2021-06-30 | Stop reason: SDUPTHER

## 2021-06-30 DIAGNOSIS — F90.0 ATTENTION DEFICIT HYPERACTIVITY DISORDER (ADHD), PREDOMINANTLY INATTENTIVE TYPE: ICD-10-CM

## 2021-06-30 RX ORDER — LISDEXAMFETAMINE DIMESYLATE 40 MG/1
40 CAPSULE ORAL DAILY
Qty: 30 CAPSULE | Refills: 0 | Status: SHIPPED | OUTPATIENT
Start: 2021-06-30 | End: 2021-08-19 | Stop reason: SDUPTHER

## 2021-07-14 ENCOUNTER — OFFICE VISIT (OUTPATIENT)
Dept: PRIMARY CARE CLINIC | Facility: CLINIC | Age: 56
End: 2021-07-14
Payer: COMMERCIAL

## 2021-07-14 VITALS
HEIGHT: 61 IN | OXYGEN SATURATION: 96 % | DIASTOLIC BLOOD PRESSURE: 78 MMHG | WEIGHT: 122 LBS | SYSTOLIC BLOOD PRESSURE: 108 MMHG | HEART RATE: 71 BPM | BODY MASS INDEX: 23.03 KG/M2 | RESPIRATION RATE: 18 BRPM

## 2021-07-14 DIAGNOSIS — R05.9 COUGH: ICD-10-CM

## 2021-07-14 DIAGNOSIS — J01.11 ACUTE RECURRENT FRONTAL SINUSITIS: ICD-10-CM

## 2021-07-14 DIAGNOSIS — Z01.419 WELL WOMAN EXAM WITH ROUTINE GYNECOLOGICAL EXAM: ICD-10-CM

## 2021-07-14 PROCEDURE — 99213 OFFICE O/P EST LOW 20 MIN: CPT | Mod: S$GLB,,, | Performed by: NURSE PRACTITIONER

## 2021-07-14 PROCEDURE — 99213 PR OFFICE/OUTPT VISIT, EST, LEVL III, 20-29 MIN: ICD-10-PCS | Mod: S$GLB,,, | Performed by: NURSE PRACTITIONER

## 2021-07-14 PROCEDURE — 3008F PR BODY MASS INDEX (BMI) DOCUMENTED: ICD-10-PCS | Mod: CPTII,S$GLB,, | Performed by: NURSE PRACTITIONER

## 2021-07-14 PROCEDURE — 3008F BODY MASS INDEX DOCD: CPT | Mod: CPTII,S$GLB,, | Performed by: NURSE PRACTITIONER

## 2021-07-14 RX ORDER — CODEINE PHOSPHATE AND GUAIFENESIN 10; 100 MG/5ML; MG/5ML
10 SOLUTION ORAL EVERY 4 HOURS PRN
Qty: 120 ML | Refills: 0 | Status: SHIPPED | OUTPATIENT
Start: 2021-07-14 | End: 2021-07-24

## 2021-07-14 RX ORDER — METHYLPREDNISOLONE 4 MG/1
TABLET ORAL
Qty: 1 PACKAGE | Refills: 0 | Status: SHIPPED | OUTPATIENT
Start: 2021-07-14 | End: 2021-09-03

## 2021-07-14 RX ORDER — AMOXICILLIN 500 MG/1
500 TABLET, FILM COATED ORAL EVERY 12 HOURS
Qty: 20 TABLET | Refills: 0 | Status: SHIPPED | OUTPATIENT
Start: 2021-07-14 | End: 2021-07-24

## 2021-07-16 ENCOUNTER — PATIENT MESSAGE (OUTPATIENT)
Dept: PRIMARY CARE CLINIC | Facility: CLINIC | Age: 56
End: 2021-07-16

## 2021-07-27 ENCOUNTER — IMMUNIZATION (OUTPATIENT)
Dept: HEMATOLOGY/ONCOLOGY | Facility: CLINIC | Age: 56
End: 2021-07-27
Payer: COMMERCIAL

## 2021-07-27 DIAGNOSIS — Z23 NEED FOR VACCINATION: Primary | ICD-10-CM

## 2021-07-27 PROCEDURE — 0001A COVID-19, MRNA, LNP-S, PF, 30 MCG/0.3 ML DOSE VACCINE: ICD-10-PCS | Mod: CV19,S$GLB,, | Performed by: FAMILY MEDICINE

## 2021-07-27 PROCEDURE — 91300 COVID-19, MRNA, LNP-S, PF, 30 MCG/0.3 ML DOSE VACCINE: CPT | Mod: S$GLB,,, | Performed by: FAMILY MEDICINE

## 2021-07-27 PROCEDURE — 91300 COVID-19, MRNA, LNP-S, PF, 30 MCG/0.3 ML DOSE VACCINE: ICD-10-PCS | Mod: S$GLB,,, | Performed by: FAMILY MEDICINE

## 2021-07-27 PROCEDURE — 0001A COVID-19, MRNA, LNP-S, PF, 30 MCG/0.3 ML DOSE VACCINE: CPT | Mod: CV19,S$GLB,, | Performed by: FAMILY MEDICINE

## 2021-08-17 ENCOUNTER — IMMUNIZATION (OUTPATIENT)
Dept: HEMATOLOGY/ONCOLOGY | Facility: CLINIC | Age: 56
End: 2021-08-17
Payer: COMMERCIAL

## 2021-08-17 DIAGNOSIS — Z23 NEED FOR VACCINATION: Primary | ICD-10-CM

## 2021-08-17 PROCEDURE — 91300 COVID-19, MRNA, LNP-S, PF, 30 MCG/0.3 ML DOSE VACCINE: CPT | Mod: S$GLB,,, | Performed by: FAMILY MEDICINE

## 2021-08-17 PROCEDURE — 0002A COVID-19, MRNA, LNP-S, PF, 30 MCG/0.3 ML DOSE VACCINE: ICD-10-PCS | Mod: CV19,S$GLB,, | Performed by: FAMILY MEDICINE

## 2021-08-17 PROCEDURE — 91300 COVID-19, MRNA, LNP-S, PF, 30 MCG/0.3 ML DOSE VACCINE: ICD-10-PCS | Mod: S$GLB,,, | Performed by: FAMILY MEDICINE

## 2021-08-17 PROCEDURE — 0002A COVID-19, MRNA, LNP-S, PF, 30 MCG/0.3 ML DOSE VACCINE: CPT | Mod: CV19,S$GLB,, | Performed by: FAMILY MEDICINE

## 2021-08-19 DIAGNOSIS — F90.0 ATTENTION DEFICIT HYPERACTIVITY DISORDER (ADHD), PREDOMINANTLY INATTENTIVE TYPE: ICD-10-CM

## 2021-08-19 RX ORDER — LISDEXAMFETAMINE DIMESYLATE 40 MG/1
40 CAPSULE ORAL DAILY
Qty: 30 CAPSULE | Refills: 0 | Status: SHIPPED | OUTPATIENT
Start: 2021-08-19 | End: 2021-09-17 | Stop reason: SDUPTHER

## 2021-09-03 ENCOUNTER — OFFICE VISIT (OUTPATIENT)
Dept: PRIMARY CARE CLINIC | Facility: CLINIC | Age: 56
End: 2021-09-03
Payer: COMMERCIAL

## 2021-09-03 VITALS
OXYGEN SATURATION: 99 % | RESPIRATION RATE: 18 BRPM | DIASTOLIC BLOOD PRESSURE: 78 MMHG | BODY MASS INDEX: 23.22 KG/M2 | WEIGHT: 123 LBS | HEART RATE: 86 BPM | HEIGHT: 61 IN | SYSTOLIC BLOOD PRESSURE: 118 MMHG

## 2021-09-03 DIAGNOSIS — F90.0 ATTENTION DEFICIT HYPERACTIVITY DISORDER (ADHD), PREDOMINANTLY INATTENTIVE TYPE: Primary | ICD-10-CM

## 2021-09-03 DIAGNOSIS — F51.01 PRIMARY INSOMNIA: ICD-10-CM

## 2021-09-03 DIAGNOSIS — F41.1 GENERALIZED ANXIETY DISORDER: ICD-10-CM

## 2021-09-03 PROCEDURE — 3078F PR MOST RECENT DIASTOLIC BLOOD PRESSURE < 80 MM HG: ICD-10-PCS | Mod: CPTII,S$GLB,, | Performed by: NURSE PRACTITIONER

## 2021-09-03 PROCEDURE — 99214 OFFICE O/P EST MOD 30 MIN: CPT | Mod: S$GLB,,, | Performed by: NURSE PRACTITIONER

## 2021-09-03 PROCEDURE — 3008F PR BODY MASS INDEX (BMI) DOCUMENTED: ICD-10-PCS | Mod: CPTII,S$GLB,, | Performed by: NURSE PRACTITIONER

## 2021-09-03 PROCEDURE — 3074F SYST BP LT 130 MM HG: CPT | Mod: CPTII,S$GLB,, | Performed by: NURSE PRACTITIONER

## 2021-09-03 PROCEDURE — 3074F PR MOST RECENT SYSTOLIC BLOOD PRESSURE < 130 MM HG: ICD-10-PCS | Mod: CPTII,S$GLB,, | Performed by: NURSE PRACTITIONER

## 2021-09-03 PROCEDURE — 1160F RVW MEDS BY RX/DR IN RCRD: CPT | Mod: CPTII,S$GLB,, | Performed by: NURSE PRACTITIONER

## 2021-09-03 PROCEDURE — 1159F PR MEDICATION LIST DOCUMENTED IN MEDICAL RECORD: ICD-10-PCS | Mod: CPTII,S$GLB,, | Performed by: NURSE PRACTITIONER

## 2021-09-03 PROCEDURE — 1159F MED LIST DOCD IN RCRD: CPT | Mod: CPTII,S$GLB,, | Performed by: NURSE PRACTITIONER

## 2021-09-03 PROCEDURE — 3008F BODY MASS INDEX DOCD: CPT | Mod: CPTII,S$GLB,, | Performed by: NURSE PRACTITIONER

## 2021-09-03 PROCEDURE — 99214 PR OFFICE/OUTPT VISIT, EST, LEVL IV, 30-39 MIN: ICD-10-PCS | Mod: S$GLB,,, | Performed by: NURSE PRACTITIONER

## 2021-09-03 PROCEDURE — 3078F DIAST BP <80 MM HG: CPT | Mod: CPTII,S$GLB,, | Performed by: NURSE PRACTITIONER

## 2021-09-03 PROCEDURE — 1160F PR REVIEW ALL MEDS BY PRESCRIBER/CLIN PHARMACIST DOCUMENTED: ICD-10-PCS | Mod: CPTII,S$GLB,, | Performed by: NURSE PRACTITIONER

## 2021-09-17 ENCOUNTER — PATIENT MESSAGE (OUTPATIENT)
Dept: PRIMARY CARE CLINIC | Facility: CLINIC | Age: 56
End: 2021-09-17

## 2021-09-17 DIAGNOSIS — F90.0 ATTENTION DEFICIT HYPERACTIVITY DISORDER (ADHD), PREDOMINANTLY INATTENTIVE TYPE: ICD-10-CM

## 2021-09-17 RX ORDER — LISDEXAMFETAMINE DIMESYLATE 40 MG/1
40 CAPSULE ORAL DAILY
Qty: 30 CAPSULE | Refills: 0 | Status: SHIPPED | OUTPATIENT
Start: 2021-09-17 | End: 2021-10-20 | Stop reason: SDUPTHER

## 2021-10-20 DIAGNOSIS — F90.0 ATTENTION DEFICIT HYPERACTIVITY DISORDER (ADHD), PREDOMINANTLY INATTENTIVE TYPE: ICD-10-CM

## 2021-10-20 RX ORDER — LISDEXAMFETAMINE DIMESYLATE 40 MG/1
40 CAPSULE ORAL DAILY
Qty: 30 CAPSULE | Refills: 0 | Status: SHIPPED | OUTPATIENT
Start: 2021-10-20 | End: 2021-11-17 | Stop reason: SDUPTHER

## 2021-10-25 ENCOUNTER — PATIENT MESSAGE (OUTPATIENT)
Dept: PRIMARY CARE CLINIC | Facility: CLINIC | Age: 56
End: 2021-10-25
Payer: COMMERCIAL

## 2021-10-25 DIAGNOSIS — N95.9 MENOPAUSAL DISORDER: ICD-10-CM

## 2021-10-25 RX ORDER — ESTRADIOL 1 MG/1
1 TABLET ORAL DAILY
Qty: 90 TABLET | Refills: 1 | Status: SHIPPED | OUTPATIENT
Start: 2021-10-25 | End: 2022-04-14 | Stop reason: SDUPTHER

## 2021-11-17 DIAGNOSIS — F90.0 ATTENTION DEFICIT HYPERACTIVITY DISORDER (ADHD), PREDOMINANTLY INATTENTIVE TYPE: ICD-10-CM

## 2021-11-17 RX ORDER — LISDEXAMFETAMINE DIMESYLATE 40 MG/1
40 CAPSULE ORAL DAILY
Qty: 30 CAPSULE | Refills: 0 | Status: SHIPPED | OUTPATIENT
Start: 2021-11-17 | End: 2021-11-22 | Stop reason: SDUPTHER

## 2021-11-22 ENCOUNTER — PATIENT MESSAGE (OUTPATIENT)
Dept: PRIMARY CARE CLINIC | Facility: CLINIC | Age: 56
End: 2021-11-22
Payer: COMMERCIAL

## 2021-11-22 DIAGNOSIS — F90.0 ATTENTION DEFICIT HYPERACTIVITY DISORDER (ADHD), PREDOMINANTLY INATTENTIVE TYPE: ICD-10-CM

## 2021-11-22 RX ORDER — LISDEXAMFETAMINE DIMESYLATE 40 MG/1
40 CAPSULE ORAL DAILY
Qty: 30 CAPSULE | Refills: 0 | Status: SHIPPED | OUTPATIENT
Start: 2021-11-22 | End: 2021-12-16 | Stop reason: SDUPTHER

## 2021-12-03 ENCOUNTER — OFFICE VISIT (OUTPATIENT)
Dept: PRIMARY CARE CLINIC | Facility: CLINIC | Age: 56
End: 2021-12-03
Payer: COMMERCIAL

## 2021-12-03 VITALS
WEIGHT: 120.19 LBS | OXYGEN SATURATION: 100 % | DIASTOLIC BLOOD PRESSURE: 85 MMHG | RESPIRATION RATE: 18 BRPM | HEIGHT: 61 IN | SYSTOLIC BLOOD PRESSURE: 117 MMHG | BODY MASS INDEX: 22.69 KG/M2 | HEART RATE: 77 BPM

## 2021-12-03 DIAGNOSIS — F41.1 GENERALIZED ANXIETY DISORDER: ICD-10-CM

## 2021-12-03 DIAGNOSIS — N95.9 MENOPAUSAL DISORDER: ICD-10-CM

## 2021-12-03 DIAGNOSIS — F90.0 ATTENTION DEFICIT HYPERACTIVITY DISORDER (ADHD), PREDOMINANTLY INATTENTIVE TYPE: Primary | ICD-10-CM

## 2021-12-03 PROCEDURE — 99214 PR OFFICE/OUTPT VISIT, EST, LEVL IV, 30-39 MIN: ICD-10-PCS | Mod: S$GLB,,, | Performed by: NURSE PRACTITIONER

## 2021-12-03 PROCEDURE — 99214 OFFICE O/P EST MOD 30 MIN: CPT | Mod: S$GLB,,, | Performed by: NURSE PRACTITIONER

## 2021-12-03 RX ORDER — CLONAZEPAM 1 MG/1
1 TABLET ORAL 2 TIMES DAILY PRN
Qty: 60 TABLET | Refills: 2 | Status: SHIPPED | OUTPATIENT
Start: 2021-12-03 | End: 2022-06-06

## 2021-12-09 LAB — NONINV COLON CA DNA+OCC BLD SCRN STL QL: NORMAL

## 2021-12-16 ENCOUNTER — PATIENT MESSAGE (OUTPATIENT)
Dept: PRIMARY CARE CLINIC | Facility: CLINIC | Age: 56
End: 2021-12-16
Payer: COMMERCIAL

## 2021-12-16 DIAGNOSIS — F90.0 ATTENTION DEFICIT HYPERACTIVITY DISORDER (ADHD), PREDOMINANTLY INATTENTIVE TYPE: ICD-10-CM

## 2021-12-16 RX ORDER — LISDEXAMFETAMINE DIMESYLATE 40 MG/1
40 CAPSULE ORAL DAILY
Qty: 30 CAPSULE | Refills: 0 | Status: SHIPPED | OUTPATIENT
Start: 2021-12-16 | End: 2022-01-18 | Stop reason: SDUPTHER

## 2022-01-03 ENCOUNTER — TELEPHONE (OUTPATIENT)
Dept: PRIMARY CARE CLINIC | Facility: CLINIC | Age: 57
End: 2022-01-03
Payer: COMMERCIAL

## 2022-01-03 ENCOUNTER — CLINICAL SUPPORT (OUTPATIENT)
Dept: HEMATOLOGY/ONCOLOGY | Facility: CLINIC | Age: 57
End: 2022-01-03
Payer: COMMERCIAL

## 2022-01-03 DIAGNOSIS — R09.81 NASAL CONGESTION: ICD-10-CM

## 2022-01-03 DIAGNOSIS — R51.9 HEADACHE IN FRONT OF HEAD: ICD-10-CM

## 2022-01-03 DIAGNOSIS — J34.89 SINUS PRESSURE: ICD-10-CM

## 2022-01-03 DIAGNOSIS — R52 BODY ACHES: Primary | ICD-10-CM

## 2022-01-03 DIAGNOSIS — R52 BODY ACHES: ICD-10-CM

## 2022-01-03 LAB
CTP QC/QA: YES
SARS-COV-2 RDRP RESP QL NAA+PROBE: POSITIVE

## 2022-01-03 PROCEDURE — U0002 COVID-19 LAB TEST NON-CDC: HCPCS | Mod: QW,S$GLB,, | Performed by: PREVENTIVE MEDICINE

## 2022-01-03 PROCEDURE — U0002: ICD-10-PCS | Mod: QW,S$GLB,, | Performed by: PREVENTIVE MEDICINE

## 2022-01-03 NOTE — TELEPHONE ENCOUNTER
Pt called requesting an order to have the covid infusion. Pt order signed and faxed to central scheduling.

## 2022-01-03 NOTE — TELEPHONE ENCOUNTER
----- Message from Angeles Burnett sent at 1/3/2022  2:50 PM CST -----  Regarding: returning call  Contact: Pt  Type:  Patient Returning Call    Who Called: Isabella Santana   Who Left Message for Patient: Key  Does the patient know what this is regarding?:pt advice   Would the patient rather a call back or a response via [facility]? Call back   Best Call Back Number: [phone]  Additional Information:

## 2022-01-03 NOTE — TELEPHONE ENCOUNTER
----- Message from Rama Bruce sent at 1/3/2022  1:35 PM CST -----  Isabella Bergeron  calling to notify the office that she has tested positive for covid. She would like for the nurse to give her a call back to discuss weather or not she needs the infusion [phone].

## 2022-01-07 ENCOUNTER — TELEPHONE (OUTPATIENT)
Dept: PRIMARY CARE CLINIC | Facility: CLINIC | Age: 57
End: 2022-01-07
Payer: COMMERCIAL

## 2022-01-07 DIAGNOSIS — K04.7 TOOTH INFECTION: Primary | ICD-10-CM

## 2022-01-07 RX ORDER — AMOXICILLIN AND CLAVULANATE POTASSIUM 875; 125 MG/1; MG/1
1 TABLET, FILM COATED ORAL 2 TIMES DAILY
Qty: 14 TABLET | Refills: 0 | Status: SHIPPED | OUTPATIENT
Start: 2022-01-07 | End: 2022-01-14

## 2022-01-07 NOTE — TELEPHONE ENCOUNTER
----- Message from Stephanie Camacho NP sent at 1/7/2022  3:39 PM CST -----  Contact: PT  Please find out what medication the patient needs since there is no treatment for COVID. Is she needing cough medication or steroid? Just let me know. Thanks.    ----- Message -----  From: Key Abbott MA  Sent: 1/7/2022   9:18 AM CST  To: Stephanie Camacho NP    Pt also just tested positive for covid Monday.  ----- Message -----  From: Katy Gonzalez  Sent: 1/7/2022   9:16 AM CST  To: Doug SALVADOR Staff    .Type:  Needs Medical Advice    Who Called:  Reece   Symptoms (please be specific): nose pain/ dizzy/ teeth hurt/ sinuses/ nausated  How long has patient had these symptoms:     Pharmacy name and phone #:   .  [facility]/pharmacy #[id number] - [place], [place] - [address]  [address] [address]  Phone: [phone] Fax: [phone]      Would the patient rather a call back or a response via [facility]?  Callback   Best Call Back Number:  [phone] (home) [phone] (work)    Additional Information:

## 2022-01-07 NOTE — TELEPHONE ENCOUNTER
Spoke with pt and she states she has been taking an old prescription of augementin to help with her symptoms but is almost out. She states she is having tooth pain and does not know if it is an abscess with everything going on. She states if you do fill comfterable prescribing her the medication she would like it sent to [facility] in [place].

## 2022-01-11 ENCOUNTER — PATIENT MESSAGE (OUTPATIENT)
Dept: PRIMARY CARE CLINIC | Facility: CLINIC | Age: 57
End: 2022-01-11
Payer: COMMERCIAL

## 2022-01-18 DIAGNOSIS — F90.0 ATTENTION DEFICIT HYPERACTIVITY DISORDER (ADHD), PREDOMINANTLY INATTENTIVE TYPE: ICD-10-CM

## 2022-01-18 RX ORDER — LISDEXAMFETAMINE DIMESYLATE 40 MG/1
40 CAPSULE ORAL DAILY
Qty: 30 CAPSULE | Refills: 0 | Status: SHIPPED | OUTPATIENT
Start: 2022-01-18 | End: 2022-02-15 | Stop reason: SDUPTHER

## 2022-02-15 DIAGNOSIS — F90.0 ATTENTION DEFICIT HYPERACTIVITY DISORDER (ADHD), PREDOMINANTLY INATTENTIVE TYPE: ICD-10-CM

## 2022-02-15 RX ORDER — LISDEXAMFETAMINE DIMESYLATE 40 MG/1
40 CAPSULE ORAL DAILY
Qty: 30 CAPSULE | Refills: 0 | Status: SHIPPED | OUTPATIENT
Start: 2022-02-15 | End: 2022-03-04

## 2022-03-04 ENCOUNTER — OFFICE VISIT (OUTPATIENT)
Dept: PRIMARY CARE CLINIC | Facility: CLINIC | Age: 57
End: 2022-03-04
Payer: COMMERCIAL

## 2022-03-04 VITALS
WEIGHT: 120.63 LBS | HEART RATE: 78 BPM | DIASTOLIC BLOOD PRESSURE: 76 MMHG | BODY MASS INDEX: 22.78 KG/M2 | RESPIRATION RATE: 18 BRPM | SYSTOLIC BLOOD PRESSURE: 118 MMHG | HEIGHT: 61 IN

## 2022-03-04 DIAGNOSIS — Z13.29 THYROID DISORDER SCREEN: ICD-10-CM

## 2022-03-04 DIAGNOSIS — F51.01 PRIMARY INSOMNIA: ICD-10-CM

## 2022-03-04 DIAGNOSIS — F41.1 GENERALIZED ANXIETY DISORDER: Chronic | ICD-10-CM

## 2022-03-04 DIAGNOSIS — F90.0 ATTENTION DEFICIT HYPERACTIVITY DISORDER (ADHD), PREDOMINANTLY INATTENTIVE TYPE: ICD-10-CM

## 2022-03-04 DIAGNOSIS — Z00.00 ANNUAL PHYSICAL EXAM: ICD-10-CM

## 2022-03-04 DIAGNOSIS — E78.00 PURE HYPERCHOLESTEROLEMIA: ICD-10-CM

## 2022-03-04 DIAGNOSIS — Z12.31 BREAST CANCER SCREENING BY MAMMOGRAM: ICD-10-CM

## 2022-03-04 DIAGNOSIS — N95.9 MENOPAUSAL DISORDER: ICD-10-CM

## 2022-03-04 DIAGNOSIS — E55.9 VITAMIN D DEFICIENCY: ICD-10-CM

## 2022-03-04 LAB
ABS NRBC COUNT: 0 X 10 3/UL (ref 0–0.01)
ABSOLUTE BASOPHIL: 0.03 X 10 3/UL (ref 0–0.22)
ABSOLUTE EOSINOPHIL: 0.1 X 10 3/UL (ref 0.04–0.54)
ABSOLUTE IMMATURE GRAN: 0.01 X 10 3/UL (ref 0–0.04)
ABSOLUTE LYMPHOCYTE: 1.04 X 10 3/UL (ref 0.86–4.75)
ABSOLUTE MONOCYTE: 0.21 X 10 3/UL (ref 0.22–1.08)
ALBUMIN SERPL-MCNC: 5.1 G/DL (ref 3.5–5.2)
ALBUMIN/GLOB SERPL ELPH: 2.6 {RATIO} (ref 1–2.7)
ALP ISOS SERPL LEV INH-CCNC: 62 U/L (ref 35–105)
ALT (SGPT): 18 U/L (ref 0–33)
ANION GAP SERPL CALC-SCNC: 11 MMOL/L (ref 8–17)
AST SERPL-CCNC: 17 U/L (ref 0–32)
BASOPHILS NFR BLD: 0.6 % (ref 0.2–1.2)
BILIRUBIN, TOTAL: 0.37 MG/DL (ref 0–1.2)
BUN/CREAT SERPL: 9.5 (ref 6–20)
CALCIUM SERPL-MCNC: 10 MG/DL (ref 8.6–10.2)
CARBON DIOXIDE, CO2: 28 MMOL/L (ref 22–29)
CHLORIDE: 101 MMOL/L (ref 98–107)
CHOLEST SERPL-MSCNC: 238 MG/DL (ref 100–200)
CREAT SERPL-MCNC: 0.81 MG/DL (ref 0.5–0.9)
EOSINOPHIL NFR BLD: 2.1 % (ref 0.7–7)
GFR ESTIMATION: 72.62
GLOBULIN: 2 G/DL (ref 1.5–4.5)
GLUCOSE: 93 MG/DL (ref 74–106)
HCT VFR BLD AUTO: 42.1 % (ref 37–47)
HDLC SERPL-MCNC: 132 MG/DL
HGB BLD-MCNC: 13.5 G/DL (ref 12–16)
IMMATURE GRANULOCYTES: 0.2 % (ref 0–0.5)
LDL/HDL RATIO: 0.6 (ref 1–3)
LDLC SERPL CALC-MCNC: 83 MG/DL (ref 0–100)
LYMPHOCYTES NFR BLD: 21.5 % (ref 19.3–53.1)
MCH RBC QN AUTO: 27.1 PG (ref 27–32)
MCHC RBC AUTO-ENTMCNC: 32.1 G/DL (ref 32–36)
MCV RBC AUTO: 84.5 FL (ref 82–100)
MONOCYTES NFR BLD: 4.3 % (ref 4.7–12.5)
NEUTROPHILS # BLD AUTO: 3.44 X 10 3/UL (ref 2.15–7.56)
NEUTROPHILS NFR BLD: 71.3 % (ref 34–71.1)
NUCLEATED RED BLOOD CELLS: 0 /100 WBC (ref 0–0.2)
PLATELET # BLD AUTO: 274 X 10 3/UL (ref 135–400)
POTASSIUM: 4.4 MMOL/L (ref 3.5–5.1)
PROT SNV-MCNC: 7.1 G/DL (ref 6.4–8.3)
RBC # BLD AUTO: 4.98 X 10 6/UL (ref 4.2–5.4)
RDW-SD: 42.9 FL (ref 37–54)
SODIUM: 140 MMOL/L (ref 136–145)
TRIGL SERPL-MCNC: 115 MG/DL (ref 0–150)
TSH W/REFLEX TO FT4: 1.68 UIU/ML (ref 0.27–4.2)
UREA NITROGEN (BUN): 7.7 MG/DL (ref 6–20)
VITAMIN D (25OHD): 32.4 NG/ML
WBC # BLD: 4.83 X 10 3/UL (ref 4.3–10.8)

## 2022-03-04 PROCEDURE — 99396 PR PREVENTIVE VISIT,EST,40-64: ICD-10-PCS | Mod: S$GLB,,, | Performed by: NURSE PRACTITIONER

## 2022-03-04 PROCEDURE — 99396 PREV VISIT EST AGE 40-64: CPT | Mod: S$GLB,,, | Performed by: NURSE PRACTITIONER

## 2022-03-04 PROCEDURE — 3078F DIAST BP <80 MM HG: CPT | Mod: CPTII,S$GLB,, | Performed by: NURSE PRACTITIONER

## 2022-03-04 PROCEDURE — 3008F PR BODY MASS INDEX (BMI) DOCUMENTED: ICD-10-PCS | Mod: CPTII,S$GLB,, | Performed by: NURSE PRACTITIONER

## 2022-03-04 PROCEDURE — 1160F PR REVIEW ALL MEDS BY PRESCRIBER/CLIN PHARMACIST DOCUMENTED: ICD-10-PCS | Mod: CPTII,S$GLB,, | Performed by: NURSE PRACTITIONER

## 2022-03-04 PROCEDURE — 1159F PR MEDICATION LIST DOCUMENTED IN MEDICAL RECORD: ICD-10-PCS | Mod: CPTII,S$GLB,, | Performed by: NURSE PRACTITIONER

## 2022-03-04 PROCEDURE — 1160F RVW MEDS BY RX/DR IN RCRD: CPT | Mod: CPTII,S$GLB,, | Performed by: NURSE PRACTITIONER

## 2022-03-04 PROCEDURE — 3074F PR MOST RECENT SYSTOLIC BLOOD PRESSURE < 130 MM HG: ICD-10-PCS | Mod: CPTII,S$GLB,, | Performed by: NURSE PRACTITIONER

## 2022-03-04 PROCEDURE — 3008F BODY MASS INDEX DOCD: CPT | Mod: CPTII,S$GLB,, | Performed by: NURSE PRACTITIONER

## 2022-03-04 PROCEDURE — 3078F PR MOST RECENT DIASTOLIC BLOOD PRESSURE < 80 MM HG: ICD-10-PCS | Mod: CPTII,S$GLB,, | Performed by: NURSE PRACTITIONER

## 2022-03-04 PROCEDURE — 1159F MED LIST DOCD IN RCRD: CPT | Mod: CPTII,S$GLB,, | Performed by: NURSE PRACTITIONER

## 2022-03-04 PROCEDURE — 3074F SYST BP LT 130 MM HG: CPT | Mod: CPTII,S$GLB,, | Performed by: NURSE PRACTITIONER

## 2022-03-04 RX ORDER — AMITRIPTYLINE HYDROCHLORIDE 25 MG/1
25 TABLET, FILM COATED ORAL NIGHTLY
Qty: 30 TABLET | Refills: 2 | Status: SHIPPED | OUTPATIENT
Start: 2022-03-04 | End: 2022-04-14 | Stop reason: SDUPTHER

## 2022-03-04 RX ORDER — LISDEXAMFETAMINE DIMESYLATE 50 MG/1
50 CAPSULE ORAL EVERY MORNING
Qty: 30 CAPSULE | Refills: 0 | Status: SHIPPED | OUTPATIENT
Start: 2022-03-15 | End: 2022-03-17 | Stop reason: SDUPTHER

## 2022-03-04 NOTE — PATIENT INSTRUCTIONS
RTC in 3 months for F/U or sooner if needed.    Keep appts with specialists as scheduled.    Instructed patient to report to nearest ER or call 911 if begins to have difficulty breathing, turning blue, chest pain, B/P < 80/60 or >170/100, palpitations, syncope, extreme weakness, or severe H/A. Patient verbalized understanding.      Patient Education       Yearly Physical for Adults   About this topic   Most people do not want to be sick. Having a checkup each year with your doctor is one way to help you stay healthy. You may need to see your doctor more or less often. How often you need to go to the doctor depends on your age. Your family and medical history also play a role in how often you need to go to the doctor. Going to see your doctor on a routine basis can help you find problems early or even before they start. This may make it easier to treat or cure your problem.  General   Your doctor will talk about many things during your checkup. Your doctor may ask about:  Your medical and family history.  All the drugs you are taking. Be sure to include all prescription, over the counter, and herbal supplements. Tell the doctor if you have any drug allergy. Bring a list of drugs you take with you.  How you are feeling and if you are having any problems.  Risky behaviors like smoking, drinking alcohol, using illegal drugs, not wearing seatbelts, having unprotected sex, etc.  Your doctor will do a physical exam and may check your:  Height and weight  Blood pressure  Reflexes  Memory  Vision  Hearing  Your doctor may order:  Lab tests  ECG to check your heart rhythm  X-rays  Tests or treatments based on your exam  What lifestyle changes are needed?   Your doctor may suggest you make changes to your lifestyle at this visit. The doctor may talk with you about being more active or lowering stress levels. Ask your doctor what you need to do.  What drugs may be needed?   Your doctor may order drugs or vaccines to protect you  from illnesses.  What changes to diet are needed?   Talk to your doctor to see if any changes are needed to your diet.  When do I need to call the doctor?   Call your doctor if you need to learn about any test results. Together you can make a plan for more care.  Helpful tips   Make a list of questions for your doctor before you go. This will help you remember to ask about any concerns. Write down any answers from your doctor so you can look over them after your visit.   Tell your doctor about any changes in your body or health since your last visit.  Ask your doctor about any screening tests you need.  Where can I learn more?   American Academy of [employer]  http://familydoctor.org/familydoctor/en/prevention-wellness/staying-healthy/healthy-living/preventive-services-for-healthy-living.printerview.html   Centers for Disease Control  http://www.cdc.gov/family/checkup/   Last Reviewed Date   2019-04-22  Consumer Information Use and Disclaimer   This information is not specific medical advice and does not replace information you receive from your health care provider. This is only a brief summary of general information. It does NOT include all information about conditions, illnesses, injuries, tests, procedures, treatments, therapies, discharge instructions or life-style choices that may apply to you. You must talk with your health care provider for complete information about your health and treatment options. This information should not be used to decide whether or not to accept your health care providers advice, instructions or recommendations. Only your health care provider has the knowledge and training to provide advice that is right for you.  Copyright   Copyright © 2021 [employer] and its affiliates and/or licensors. All rights reserved.      Patient Education       Attention Deficit Hyperactivity Disorder (ADHD) Discharge Instructions   About this topic   Attention deficit hyperactivity disorder  is also called ADHD or ADD. Most often, this starts at a young age. This disorder makes it hard to focus or sit still. People with ADHD may find it hard to make good decisions. Children with ADHD may have trouble in school and at home. Adults may have problems at work. People with ADHD may also have a problem getting along well with others. While there is no cure for ADHD, you and your doctor can work on a plan that is best for you to treat the signs of ADHD.  What care is needed at home?   Ask your doctor what you need to do when you go home. Make sure you ask questions if you do not understand what the doctor says. This way you will know what you need to do.  Try to get enough sleep at night. Most often adults need 7 to 8 hours each night and children need 8 to 10 hours each night. Rest during the day if you are tired.  Eat and sleep at the same times every day.  Have a plan for where to keep things in your home. Use checklists, things to help you remember, and alarms. A list of things you may need to find in a hurry can be helpful. These can help you put things in the right place and manage your time.  Work on getting better at reading and taking notes.  Have a space that is just for work or homework so you will be able to pay attention. This may be an office or a desk facing a wall. Try using headphones so you cannot hear the other noises.  Do one thing at a time. Keep a list of the next things you were planning to do or say.  Break large jobs or things you have to do into smaller jobs. This will make them easier to finish. Reward yourself along the way.  Have rules that are clear and easy to follow.   Look at where you are the strongest. Give rewards for good behavior, finished schoolwork, or for doing a good job at work.  Do not do too many things that might cause stress.  What follow-up care is needed?   Your doctor may ask you to make visits to the office to check on your progress. Be sure to keep these  visits.  Your doctor may send you to a special mental health doctor. This person will talk with you about the problems you are having. Then, you can work together to find ways to help you manage them.  Your doctor may suggest you try talk therapy to help you live well with your ADHD.  What drugs may be needed?   The doctor may order drugs to:  Help lower your worry  Help you to pay attention  Help you be more calm  Help you be less impulsive  Help keep things in your life balanced  Care for low mood  Will physical activity be limited?   Physical activity will help keep your mind and body in good shape. Talk with your doctor about the right amount of activity for you.  What problems could happen?   Feeling badly about yourself  Raise the chances of you hurting yourself, such as injury in a car accident  Not do well in school and work  Trouble making friends or getting along well with others  Raise your chance to abuse alcohol or drugs  What can be done to prevent this health problem?   The cause of ADHD is not clear, but to lower the chance of your child having ADHD:  Do not drink beer, wine, or mixed drinks (alcohol) while you are pregnant.  Do not smoke or use illegal drugs while you are pregnant.  Keep your child away from things like harmful toxins and chemicals.  Keep your child from having too much time in front of computers, TV, and video games.  Get plenty of exercise.  Be more aware of thoughts, emotions, and experiences each moment. This is mindfulness.  When do I need to call the doctor?   Drugs are not working  Symptoms are getting worse  Teach Back: Helping You Understand   The Teach Back Method helps you understand the information we are giving you. After you talk with the staff, tell them in your own words what you learned. This helps to make sure the staff has described each thing clearly. It also helps to explain things that may have been confusing. Before going home, make sure you can do these:  I  can tell you about my condition.  I can tell you ways to help me pay attention.  I can tell you what I will do if I think my drugs are not working.  Where can I learn more?   HelpGuide.org  http://www.helpguide.org/articles/add-adhd/attention-deficit-disorder-adhd-parenting-tips.htm   KidsHealth  http://kidshealth.org/parent/medical/learning/adhd.html   [place] of [facility]  http://www.[facility].nih.gov/health/topics/attention-deficit-hyperactivity-disorder-adhd/index.shtml   Last Reviewed Date   2020-06-14  Consumer Information Use and Disclaimer   This information is not specific medical advice and does not replace information you receive from your health care provider. This is only a brief summary of general information. It does NOT include all information about conditions, illnesses, injuries, tests, procedures, treatments, therapies, discharge instructions or life-style choices that may apply to you. You must talk with your health care provider for complete information about your health and treatment options. This information should not be used to decide whether or not to accept your health care providers advice, instructions or recommendations. Only your health care provider has the knowledge and training to provide advice that is right for you.  Copyright   Copyright © 2021 [employer]. and its affiliates and/or licensors. All rights reserved.    Patient Education       Anxiety Discharge Instructions, Adult   About this topic   Anxiety can cause you to feel very worried. It can also cause physical symptoms like chest pain, stomach aches, or trouble sleeping. While mild anxiety is a normal response to stress, it can cause you problems in your everyday life. You may need follow-up care to help manage your anxiety. Anxiety happens in many forms, like:  Being scared all the time that something bad is going to happen. This is generalized anxiety.  Strong bursts of fear where your body has signs that  may feel like a heart attack. This is called a panic attack.  Upsetting thoughts that happen often. There is a need to repeat doing certain things to help get rid of the anxiety caused by these thoughts. The thoughts or actions may be about checking on things, touching things, or worry about germs. This is an obsessive-compulsive disorder.  Strong fear of an object, place, or condition. This is a phobia.  Fear that others think bad things about you or being put down by other people. This is social anxiety.  Nightmares, flashbacks, staying away from people, or having panic attacks when reminded of a shocking or hurtful time or place from the past. This is post-traumatic stress.  Anxiety disorder may be treated in many ways. Some kinds of treatment have you talk about your beliefs, fears, and worries. You may learn how certain thoughts or feelings can raise anxiety. You may also learn what steps to take to lower anxiety. Other kinds of treatment may have you look back on a hurtful event, sad memory, or something you are afraid of. The doctor will help you deal with the feelings that you may have. You may learn ways to cope with unwanted events or thoughts by looking at your fears in a way that feels safe.  What care is needed at home?   Ask your doctor what you need to do when you go home. Make sure you ask questions if you do not understand what the doctor says.  Set a time to talk with a counselor about your worries and feelings. This can help you with your anxiety.  Take care to follow all instructions when you take your medicines.  Limit alcohol and caffeine.  Learn ways to manage stress. Relaxation methods like reflection, deep breathing, and muscle relaxation may be helpful. Things like yoga, exercise, and mike chi are also good.  Talk about your feelings with family members and friends you trust. Talk to someone who can help you see how your thoughts at certain times may raise your anxiety.     What follow-up  care is needed?   Your doctor may ask you to make visits to the office to check on your progress. Be sure to keep these visits.  What drugs may be needed?   The doctor may order drugs to help the physical signs of anxiety. Make sure that you take the drugs as taught to you by the doctor. Talk with your doctor about any side effects and ask how long you should take the drug.  Will physical activity be limited?   You may take part in physical activities. Some people are limited because of their anxiety or fear. Talk with your doctor about the right amount of activity for you.  What changes to diet are needed?   Eat a variety of healthy foods and limit drinks with caffeine. You should avoid alcohol, energy drinks, and over-the-counter stimulants.  What problems could happen?   If your anxiety is not treated, it can result in:  Staying away from work or social events  Not being able to do everyday tasks  Keeping away from family and friends  What can be done to prevent this health problem?   Learn what events, people, or things upset you. Limit your contact with them.  Talk about your feelings. Talk to someone who can help you see how your thoughts at certain times may raise your anxiety.  Seek support from your friends and family. Find someone who calms you down. Ask if you can call them when you are getting anxious.  When do I need to call the doctor?   You feel you may harm yourself or someone else.  You can also call a mental health hotline for help.  You have any physical symptoms, such as chest pain, trouble breathing, or severe belly pain, that could be a sign of a serious problem.  If you are short of breath.  If you do not feel like you can be alone.  Teach Back: Helping You Understand   The Teach Back Method helps you understand the information we are giving you. After you talk with the staff, tell them in your own words what you learned. This helps to make sure the staff has described each thing clearly. It  also helps to explain things that may have been confusing. Before going home, make sure you can do these:  I can tell you about my condition and the drugs I need to take.  I can tell you what may help lower my anxiety.  I can tell you what I will do if it is hard to breathe or I have chest pain.  I can tell you what I will do if I do not feel safe or cannot be alone.  Where can I learn more?   [employer]  https://www.g[facility]elda.org/Learn-More/Mental-Health-Conditions/Anxiety-Disorders   National Health Service  https://www.nhs.uk/conditions/generalised-anxiety-disorder/symptoms/   [place] of [facility] ? [facility]  https://www.sudha.nih.gov/health/relieving-stress-anxiety-hwgxcmeuc-octjfocwvb-xyooyfawgs   [place] of Mental Health  http://www.nimh.nih.gov/health/publications/anxiety-disorders/complete-index.shtml   Last Reviewed Date   2021-06-08  Consumer Information Use and Disclaimer   This information is not specific medical advice and does not replace information you receive from your health care provider. This is only a brief summary of general information. It does NOT include all information about conditions, illnesses, injuries, tests, procedures, treatments, therapies, discharge instructions or life-style choices that may apply to you. You must talk with your health care provider for complete information about your health and treatment options. This information should not be used to decide whether or not to accept your health care providers advice, instructions or recommendations. Only your health care provider has the knowledge and training to provide advice that is right for you.  Copyright   Copyright © 2021 [employer], [employer]. and its affiliates and/or licensors. All rights reserved.

## 2022-03-04 NOTE — PROGRESS NOTES
Subjective:       Patient ID: Isabella Santana is a 56 y.o. female.    Chief Complaint: Annual Exam    55 y/o female presents for annual visit. Due for annual labs. 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    Feels well today but she has been stressed lately due to work and life. She has also been having trouble sleeping and staying sleep.    Anxiety - chronic, usually controlled on clonazepam as needed. Only takes when she needs it. Denies se/ar to medication. Denies being depressed. Denies SI/HI.    ADHD - takes Vyvanse 40 mg daily but no longer lasting throughout the day so she loses focus. No s/e of CP, palpitations, syncope, Denies se/at to medication.  checked and is appropriate.    Had a total hysterectomy in 2012 by Dr. Jordan and takes Estradiol daily which works well.     Due for mammogram. Had a lesion in her left breast years ago in which she had a biopsy and it was benign.     Had Cologuard kit at home and completed it but the sample was unable to be processed. She will call and have them send her a new test.    Drinks socially and denies drinking.    UTD with COVID and FLU vaccines.    No new issues or concerns.            Past Medical History:   Diagnosis Date    ADHD (attention deficit hyperactivity disorder)     Anxiety     Hormone deficiency     Insomnia        Past Surgical History:   Procedure Laterality Date    BREAST BIOPSY Left     HYSTERECTOMY      WISDOM TOOTH EXTRACTION         Family History   Problem Relation Age of Onset    Diabetes Mother     Cancer Father     Heart disease Father     Cancer Sister        Social History     Tobacco Use    Smoking status: Former Smoker     Types: Vaping w/o nicotine    Smokeless tobacco: Never Used   Substance Use Topics    Alcohol use: Yes    Drug use: Never       Patient Active Problem List   Diagnosis    Attention deficit hyperactivity disorder    Breast lump    Primary insomnia     "Menopausal disorder    Generalized anxiety disorder       Immunization History   Administered Date(s) Administered    COVID-19, MRNA, LN-S, PF (Pfizer) (Purple Cap) 07/27/2021, 08/17/2021    Influenza - Quadrivalent - PF *Preferred* (6 months and older) 10/05/2020, 09/23/2021           Review of Systems   Constitutional: Negative for activity change, appetite change, chills, diaphoresis, fatigue and fever.   HENT: Negative for congestion, ear pain, tinnitus, trouble swallowing and voice change.    Eyes: Negative for pain and visual disturbance.   Respiratory: Negative for cough, chest tightness, shortness of breath and wheezing.    Cardiovascular: Negative for chest pain, palpitations and leg swelling.   Gastrointestinal: Negative for abdominal distention, abdominal pain, blood in stool, constipation, diarrhea, nausea and vomiting.   Endocrine: Negative for cold intolerance, heat intolerance, polydipsia, polyphagia and polyuria.   Genitourinary: Negative for decreased urine volume, dysuria, flank pain, frequency, hematuria and urgency.   Musculoskeletal: Negative for arthralgias, gait problem, neck pain and neck stiffness.   Skin: Negative for color change, pallor and rash.   Neurological: Negative for dizziness, syncope, weakness, light-headedness and headaches.   Hematological: Negative for adenopathy. Does not bruise/bleed easily.   Psychiatric/Behavioral: Positive for decreased concentration and sleep disturbance. Negative for behavioral problems, confusion, dysphoric mood and suicidal ideas. The patient is nervous/anxious.      Objective:     Vitals:    03/04/22 0818   BP: 118/76   BP Location: Right arm   Patient Position: Sitting   BP Method: Large (Manual)   Pulse: 78   Resp: 18   Weight: 54.7 kg (120 lb 9.6 oz)   Height: 5' 1" (1.549 m)       Physical Exam  Vitals and nursing note reviewed.   Constitutional:       General: She is not in acute distress.     Appearance: Normal appearance. She is not " ill-appearing or diaphoretic.   HENT:      Head: Normocephalic and atraumatic.      Right Ear: External ear normal.      Left Ear: External ear normal.      Nose: Nose normal.      Mouth/Throat:      Mouth: Mucous membranes are moist. Mucous membranes are not pale, not dry and not cyanotic.      Pharynx: Oropharynx is clear.   Eyes:      Extraocular Movements: Extraocular movements intact.      Conjunctiva/sclera: Conjunctivae normal.      Pupils: Pupils are equal, round, and reactive to light.   Neck:      Thyroid: No thyromegaly or thyroid tenderness.      Vascular: No JVD.   Cardiovascular:      Rate and Rhythm: Normal rate and regular rhythm.      Pulses: Normal pulses.      Heart sounds: Normal heart sounds.   Pulmonary:      Effort: Pulmonary effort is normal.      Breath sounds: Normal breath sounds.   Abdominal:      General: Bowel sounds are normal. There is no distension.      Palpations: Abdomen is soft.      Tenderness: There is no abdominal tenderness.   Musculoskeletal:         General: Normal range of motion.      Cervical back: Normal range of motion and neck supple.      Right lower leg: No edema.      Left lower leg: No edema.   Skin:     General: Skin is warm and dry.      Capillary Refill: Capillary refill takes less than 2 seconds.   Neurological:      General: No focal deficit present.      Mental Status: She is alert and oriented to person, place, and time.      Gait: Gait is intact.   Psychiatric:         Mood and Affect: Affect normal. Mood is anxious.         Speech: Speech normal.         Behavior: Behavior normal. Behavior is cooperative.         Thought Content: Thought content normal. Thought content does not include homicidal or suicidal ideation. Thought content does not include homicidal or suicidal plan.         Cognition and Memory: Cognition and memory normal.         Judgment: Judgment normal.         Clinical Support on 01/03/2022   Component Date Value Ref Range Status    POC  Rapid COVID 01/03/2022 Positive (A) Negative Final     Acceptable 01/03/2022 Yes   Final         Assessment:      1. Attention deficit hyperactivity disorder (ADHD), predominantly inattentive type    2. Generalized anxiety disorder Stable   3. Menopausal disorder    4. Annual physical exam    5. Pure hypercholesterolemia    6. Vitamin D deficiency    7. Thyroid disorder screen    8. Breast cancer screening by mammogram    9. Primary insomnia          Plan:     Attention deficit hyperactivity disorder (ADHD), predominantly inattentive type  Comments:  Vyvanse increased to 50 mg daily, F/U in 3 months  Orders:  -     lisdexamfetamine (VYVANSE) 50 MG capsule; Take 1 capsule (50 mg total) by mouth every morning.  Dispense: 30 capsule; Refill: 0    Generalized anxiety disorder  Comments:  continue clonazepam as needed    Menopausal disorder  Comments:  continue estradiol daily    Annual physical exam  Comments:  Will review labs and determine POC based on results  Orders:  -     CBC Auto Differential; Future; Expected date: 03/04/2022  -     Comprehensive Metabolic Panel; Future; Expected date: 03/04/2022  -     TSH w/reflex to FT4; Future; Expected date: 03/04/2022  -     Lipid Panel; Future; Expected date: 03/04/2022    Pure hypercholesterolemia  Comments:  Eat a low cholesterol and exercise regularly  Orders:  -     Lipid Panel; Future; Expected date: 03/04/2022    Vitamin D deficiency  -     Vitamin D; Future; Expected date: 03/04/2022    Thyroid disorder screen  -     TSH w/reflex to FT4; Future; Expected date: 03/04/2022    Breast cancer screening by mammogram  -     Mammo Digital Screening Bilat; Future; Expected date: 03/04/2022    Primary insomnia  Comments:  Start amitriptyline 25 mg nightly  Orders:  -     amitriptyline (ELAVIL) 25 MG tablet; Take 1 tablet (25 mg total) by mouth every evening.  Dispense: 30 tablet; Refill: 2         Current Outpatient Medications   Medication Sig Dispense  Refill    clonazePAM (KLONOPIN) 1 MG tablet Take 1 tablet (1 mg total) by mouth 2 (two) times daily as needed for Anxiety. 60 tablet 2    estradioL (ESTRACE) 1 MG tablet Take 1 tablet (1 mg total) by mouth once daily. 90 tablet 1    amitriptyline (ELAVIL) 25 MG tablet Take 1 tablet (25 mg total) by mouth every evening. 30 tablet 2    [START ON 3/15/2022] lisdexamfetamine (VYVANSE) 50 MG capsule Take 1 capsule (50 mg total) by mouth every morning. 30 capsule 0     No current facility-administered medications for this visit.       Medications Discontinued During This Encounter   Medication Reason    lisdexamfetamine (VYVANSE) 40 MG Cap Change in Dosage Form       Health Maintenance   Topic Date Due    Mammogram  09/16/2020    Lipid Panel  01/07/2026    Hepatitis C Screening  Completed    TETANUS VACCINE  Discontinued       Patient Instructions   RTC in 3 months for F/U or sooner if needed.    Keep appts with specialists as scheduled.    Instructed patient to report to nearest ER or call 911 if begins to have difficulty breathing, turning blue, chest pain, B/P < 80/60 or >170/100, palpitations, syncope, extreme weakness, or severe H/A. Patient verbalized understanding.      Patient Education       Yearly Physical for Adults   About this topic   Most people do not want to be sick. Having a checkup each year with your doctor is one way to help you stay healthy. You may need to see your doctor more or less often. How often you need to go to the doctor depends on your age. Your family and medical history also play a role in how often you need to go to the doctor. Going to see your doctor on a routine basis can help you find problems early or even before they start. This may make it easier to treat or cure your problem.  General   Your doctor will talk about many things during your checkup. Your doctor may ask about:  · Your medical and family history.  · All the drugs you are taking. Be sure to include all  prescription, over the counter, and herbal supplements. Tell the doctor if you have any drug allergy. Bring a list of drugs you take with you.  · How you are feeling and if you are having any problems.  · Risky behaviors like smoking, drinking alcohol, using illegal drugs, not wearing seatbelts, having unprotected sex, etc.  Your doctor will do a physical exam and may check your:  · Height and weight  · Blood pressure  · Reflexes  · Memory  · Vision  · Hearing  Your doctor may order:  · Lab tests  · ECG to check your heart rhythm  · X-rays  · Tests or treatments based on your exam  What lifestyle changes are needed?   Your doctor may suggest you make changes to your lifestyle at this visit. The doctor may talk with you about being more active or lowering stress levels. Ask your doctor what you need to do.  What drugs may be needed?   Your doctor may order drugs or vaccines to protect you from illnesses.  What changes to diet are needed?   Talk to your doctor to see if any changes are needed to your diet.  When do I need to call the doctor?   Call your doctor if you need to learn about any test results. Together you can make a plan for more care.  Helpful tips   · Make a list of questions for your doctor before you go. This will help you remember to ask about any concerns. Write down any answers from your doctor so you can look over them after your visit.   · Tell your doctor about any changes in your body or health since your last visit.  · Ask your doctor about any screening tests you need.  Where can I learn more?   American Academy of Family Physicians  http://familydoctor.org/familydoctor/en/prevention-wellness/staying-healthy/healthy-living/preventive-services-for-healthy-living.printerview.html   Centers for Disease Control  http://www.cdc.gov/family/checkup/   Last Reviewed Date   2019-04-22  Consumer Information Use and Disclaimer   This information is not specific medical advice and does not replace  information you receive from your health care provider. This is only a brief summary of general information. It does NOT include all information about conditions, illnesses, injuries, tests, procedures, treatments, therapies, discharge instructions or life-style choices that may apply to you. You must talk with your health care provider for complete information about your health and treatment options. This information should not be used to decide whether or not to accept your health care providers advice, instructions or recommendations. Only your health care provider has the knowledge and training to provide advice that is right for you.  Copyright   Copyright © 2021 [employer] and its affiliates and/or licensors. All rights reserved.      Patient Education       Attention Deficit Hyperactivity Disorder (ADHD) Discharge Instructions   About this topic   Attention deficit hyperactivity disorder is also called ADHD or ADD. Most often, this starts at a young age. This disorder makes it hard to focus or sit still. People with ADHD may find it hard to make good decisions. Children with ADHD may have trouble in school and at home. Adults may have problems at work. People with ADHD may also have a problem getting along well with others. While there is no cure for ADHD, you and your doctor can work on a plan that is best for you to treat the signs of ADHD.  What care is needed at home?   · Ask your doctor what you need to do when you go home. Make sure you ask questions if you do not understand what the doctor says. This way you will know what you need to do.  · Try to get enough sleep at night. Most often adults need 7 to 8 hours each night and children need 8 to 10 hours each night. Rest during the day if you are tired.  · Eat and sleep at the same times every day.  · Have a plan for where to keep things in your home. Use checklists, things to help you remember, and alarms. A list of things you may need to find in a  hurry can be helpful. These can help you put things in the right place and manage your time.  · Work on getting better at reading and taking notes.  · Have a space that is just for work or homework so you will be able to pay attention. This may be an office or a desk facing a wall. Try using headphones so you cannot hear the other noises.  · Do one thing at a time. Keep a list of the next things you were planning to do or say.  · Break large jobs or things you have to do into smaller jobs. This will make them easier to finish. Reward yourself along the way.  · Have rules that are clear and easy to follow.   · Look at where you are the strongest. Give rewards for good behavior, finished schoolwork, or for doing a good job at work.  · Do not do too many things that might cause stress.  What follow-up care is needed?   · Your doctor may ask you to make visits to the office to check on your progress. Be sure to keep these visits.  · Your doctor may send you to a special mental health doctor. This person will talk with you about the problems you are having. Then, you can work together to find ways to help you manage them.  · Your doctor may suggest you try talk therapy to help you live well with your ADHD.  What drugs may be needed?   The doctor may order drugs to:  · Help lower your worry  · Help you to pay attention  · Help you be more calm  · Help you be less impulsive  · Help keep things in your life balanced  · Care for low mood  Will physical activity be limited?   Physical activity will help keep your mind and body in good shape. Talk with your doctor about the right amount of activity for you.  What problems could happen?   · Feeling badly about yourself  · Raise the chances of you hurting yourself, such as injury in a car accident  · Not do well in school and work  · Trouble making friends or getting along well with others  · Raise your chance to abuse alcohol or drugs  What can be done to prevent this health  problem?   The cause of ADHD is not clear, but to lower the chance of your child having ADHD:  · Do not drink beer, wine, or mixed drinks (alcohol) while you are pregnant.  · Do not smoke or use illegal drugs while you are pregnant.  · Keep your child away from things like harmful toxins and chemicals.  · Keep your child from having too much time in front of computers, TV, and video games.  · Get plenty of exercise.  · Be more aware of thoughts, emotions, and experiences each moment. This is mindfulness.  When do I need to call the doctor?   · Drugs are not working  · Symptoms are getting worse  Teach Back: Helping You Understand   The Teach Back Method helps you understand the information we are giving you. After you talk with the staff, tell them in your own words what you learned. This helps to make sure the staff has described each thing clearly. It also helps to explain things that may have been confusing. Before going home, make sure you can do these:  · I can tell you about my condition.  · I can tell you ways to help me pay attention.  · I can tell you what I will do if I think my drugs are not working.  Where can I learn more?   HelpGuide.org  http://www.helpguide.org/articles/add-adhd/attention-deficit-disorder-adhd-parenting-tips.htm   KidsHealth  http://kidshealth.org/parent/medical/learning/adhd.html   National [place] [employer]  http://www.nimh.nih.gov/health/topics/attention-deficit-hyperactivity-disorder-adhd/index.shtml   Last Reviewed Date   2020-06-14  Consumer Information Use and Disclaimer   This information is not specific medical advice and does not replace information you receive from your health care provider. This is only a brief summary of general information. It does NOT include all information about conditions, illnesses, injuries, tests, procedures, treatments, therapies, discharge instructions or life-style choices that may apply to you. You must talk with your health care  provider for complete information about your health and treatment options. This information should not be used to decide whether or not to accept your health care providers advice, instructions or recommendations. Only your health care provider has the knowledge and training to provide advice that is right for you.  Copyright   Copyright © 2021 UpToDate, Inc. and its affiliates and/or licensors. All rights reserved.    Patient Education       Anxiety Discharge Instructions, Adult   About this topic   Anxiety can cause you to feel very worried. It can also cause physical symptoms like chest pain, stomach aches, or trouble sleeping. While mild anxiety is a normal response to stress, it can cause you problems in your everyday life. You may need follow-up care to help manage your anxiety. Anxiety happens in many forms, like:  · Being scared all the time that something bad is going to happen. This is generalized anxiety.  · Strong bursts of fear where your body has signs that may feel like a heart attack. This is called a panic attack.  · Upsetting thoughts that happen often. There is a need to repeat doing certain things to help get rid of the anxiety caused by these thoughts. The thoughts or actions may be about checking on things, touching things, or worry about germs. This is an obsessive-compulsive disorder.  · Strong fear of an object, place, or condition. This is a phobia.  · Fear that others think bad things about you or being put down by other people. This is social anxiety.  · Nightmares, flashbacks, staying away from people, or having panic attacks when reminded of a shocking or hurtful time or place from the past. This is post-traumatic stress.  Anxiety disorder may be treated in many ways. Some kinds of treatment have you talk about your beliefs, fears, and worries. You may learn how certain thoughts or feelings can raise anxiety. You may also learn what steps to take to lower anxiety. Other kinds of  treatment may have you look back on a hurtful event, sad memory, or something you are afraid of. The doctor will help you deal with the feelings that you may have. You may learn ways to cope with unwanted events or thoughts by looking at your fears in a way that feels safe.  What care is needed at home?   · Ask your doctor what you need to do when you go home. Make sure you ask questions if you do not understand what the doctor says.  · Set a time to talk with a counselor about your worries and feelings. This can help you with your anxiety.  · Take care to follow all instructions when you take your medicines.  · Limit alcohol and caffeine.  · Learn ways to manage stress. Relaxation methods like reflection, deep breathing, and muscle relaxation may be helpful. Things like yoga, exercise, and mike chi are also good.  · Talk about your feelings with family members and friends you trust. Talk to someone who can help you see how your thoughts at certain times may raise your anxiety.     What follow-up care is needed?   Your doctor may ask you to make visits to the office to check on your progress. Be sure to keep these visits.  What drugs may be needed?   The doctor may order drugs to help the physical signs of anxiety. Make sure that you take the drugs as taught to you by the doctor. Talk with your doctor about any side effects and ask how long you should take the drug.  Will physical activity be limited?   You may take part in physical activities. Some people are limited because of their anxiety or fear. Talk with your doctor about the right amount of activity for you.  What changes to diet are needed?   Eat a variety of healthy foods and limit drinks with caffeine. You should avoid alcohol, energy drinks, and over-the-counter stimulants.  What problems could happen?   If your anxiety is not treated, it can result in:  · Staying away from work or social events  · Not being able to do everyday tasks  · Keeping away from  family and friends  What can be done to prevent this health problem?   · Learn what events, people, or things upset you. Limit your contact with them.  · Talk about your feelings. Talk to someone who can help you see how your thoughts at certain times may raise your anxiety.  · Seek support from your friends and family. Find someone who calms you down. Ask if you can call them when you are getting anxious.  When do I need to call the doctor?   · You feel you may harm yourself or someone else.  · You can also call a mental health hotline for help.  · You have any physical symptoms, such as chest pain, trouble breathing, or severe belly pain, that could be a sign of a serious problem.  · If you are short of breath.  · If you do not feel like you can be alone.  Teach Back: Helping You Understand   The Teach Back Method helps you understand the information we are giving you. After you talk with the staff, tell them in your own words what you learned. This helps to make sure the staff has described each thing clearly. It also helps to explain things that may have been confusing. Before going home, make sure you can do these:  · I can tell you about my condition and the drugs I need to take.  · I can tell you what may help lower my anxiety.  · I can tell you what I will do if it is hard to breathe or I have chest pain.  · I can tell you what I will do if I do not feel safe or cannot be alone.  Where can I learn more?   [employer]  https://www.gricelda.org/Learn-More/Mental-Health-Conditions/Anxiety-Disorders   National Health Service  https://www.nhs.uk/conditions/generalised-anxiety-disorder/symptoms/   [employer] [place] [facility] ? [facility]  https://www.sudha.nih.gov/health/relieving-stress-anxiety-rqjowmeww-hkkhsplnvn-rhipshhmdw   [employer] [place] of Mental Health  http://www.nimh.nih.gov/health/publications/anxiety-disorders/complete-index.shtml   Last Reviewed Date   2021-06-08  Consumer Information Use and Disclaimer    This information is not specific medical advice and does not replace information you receive from your health care provider. This is only a brief summary of general information. It does NOT include all information about conditions, illnesses, injuries, tests, procedures, treatments, therapies, discharge instructions or life-style choices that may apply to you. You must talk with your health care provider for complete information about your health and treatment options. This information should not be used to decide whether or not to accept your health care providers advice, instructions or recommendations. Only your health care provider has the knowledge and training to provide advice that is right for you.  Copyright   Copyright © 2021 [employer] and its affiliates and/or licensors. All rights reserved.          Risks, benefits, and alternatives discussed with patient, Patient verbalized understanding of discussed plan of care. Asked patient if any further questions, answered no.    Future Appointments   Date Time Provider Department [place]   6/6/2022  5:00 PM Carline Camacho NP [facility]              Carline Camacho NP

## 2022-03-08 NOTE — PROGRESS NOTES
Cholesterol slightly elevated, will recommend low cholesterol diet and regular exercise. All other labs are within normal range.

## 2022-03-17 ENCOUNTER — PATIENT MESSAGE (OUTPATIENT)
Dept: PRIMARY CARE CLINIC | Facility: CLINIC | Age: 57
End: 2022-03-17
Payer: COMMERCIAL

## 2022-03-17 DIAGNOSIS — F90.0 ATTENTION DEFICIT HYPERACTIVITY DISORDER (ADHD), PREDOMINANTLY INATTENTIVE TYPE: ICD-10-CM

## 2022-03-17 RX ORDER — LISDEXAMFETAMINE DIMESYLATE 50 MG/1
50 CAPSULE ORAL EVERY MORNING
Qty: 30 CAPSULE | Refills: 0 | Status: SHIPPED | OUTPATIENT
Start: 2022-03-17 | End: 2022-04-14 | Stop reason: SDUPTHER

## 2022-04-14 DIAGNOSIS — F90.0 ATTENTION DEFICIT HYPERACTIVITY DISORDER (ADHD), PREDOMINANTLY INATTENTIVE TYPE: ICD-10-CM

## 2022-04-14 DIAGNOSIS — F51.01 PRIMARY INSOMNIA: ICD-10-CM

## 2022-04-14 DIAGNOSIS — N95.9 MENOPAUSAL DISORDER: ICD-10-CM

## 2022-04-14 RX ORDER — LISDEXAMFETAMINE DIMESYLATE 50 MG/1
50 CAPSULE ORAL EVERY MORNING
Qty: 30 CAPSULE | Refills: 0 | Status: SHIPPED | OUTPATIENT
Start: 2022-04-14 | End: 2022-05-16 | Stop reason: SDUPTHER

## 2022-04-14 RX ORDER — ESTRADIOL 1 MG/1
1 TABLET ORAL DAILY
Qty: 90 TABLET | Refills: 1 | Status: SHIPPED | OUTPATIENT
Start: 2022-04-14 | End: 2022-10-13

## 2022-04-14 RX ORDER — AMITRIPTYLINE HYDROCHLORIDE 25 MG/1
25 TABLET, FILM COATED ORAL NIGHTLY
Qty: 30 TABLET | Refills: 2 | Status: SHIPPED | OUTPATIENT
Start: 2022-04-14 | End: 2022-08-01

## 2022-05-16 DIAGNOSIS — F90.0 ATTENTION DEFICIT HYPERACTIVITY DISORDER (ADHD), PREDOMINANTLY INATTENTIVE TYPE: ICD-10-CM

## 2022-05-16 RX ORDER — LISDEXAMFETAMINE DIMESYLATE 50 MG/1
50 CAPSULE ORAL EVERY MORNING
Qty: 30 CAPSULE | Refills: 0 | Status: SHIPPED | OUTPATIENT
Start: 2022-05-16 | End: 2022-06-06

## 2022-06-06 ENCOUNTER — OFFICE VISIT (OUTPATIENT)
Dept: PRIMARY CARE CLINIC | Facility: CLINIC | Age: 57
End: 2022-06-06
Payer: COMMERCIAL

## 2022-06-06 VITALS
RESPIRATION RATE: 18 BRPM | HEART RATE: 81 BPM | HEIGHT: 61 IN | SYSTOLIC BLOOD PRESSURE: 108 MMHG | BODY MASS INDEX: 23.6 KG/M2 | DIASTOLIC BLOOD PRESSURE: 74 MMHG | OXYGEN SATURATION: 100 % | WEIGHT: 125 LBS

## 2022-06-06 DIAGNOSIS — F51.01 PRIMARY INSOMNIA: ICD-10-CM

## 2022-06-06 DIAGNOSIS — N95.9 MENOPAUSAL DISORDER: ICD-10-CM

## 2022-06-06 DIAGNOSIS — F90.2 ATTENTION DEFICIT HYPERACTIVITY DISORDER (ADHD), COMBINED TYPE: ICD-10-CM

## 2022-06-06 PROCEDURE — 3078F PR MOST RECENT DIASTOLIC BLOOD PRESSURE < 80 MM HG: ICD-10-PCS | Mod: CPTII,S$GLB,, | Performed by: NURSE PRACTITIONER

## 2022-06-06 PROCEDURE — 99214 PR OFFICE/OUTPT VISIT, EST, LEVL IV, 30-39 MIN: ICD-10-PCS | Mod: S$GLB,,, | Performed by: NURSE PRACTITIONER

## 2022-06-06 PROCEDURE — 3074F SYST BP LT 130 MM HG: CPT | Mod: CPTII,S$GLB,, | Performed by: NURSE PRACTITIONER

## 2022-06-06 PROCEDURE — 3078F DIAST BP <80 MM HG: CPT | Mod: CPTII,S$GLB,, | Performed by: NURSE PRACTITIONER

## 2022-06-06 PROCEDURE — 3008F BODY MASS INDEX DOCD: CPT | Mod: CPTII,S$GLB,, | Performed by: NURSE PRACTITIONER

## 2022-06-06 PROCEDURE — 3074F PR MOST RECENT SYSTOLIC BLOOD PRESSURE < 130 MM HG: ICD-10-PCS | Mod: CPTII,S$GLB,, | Performed by: NURSE PRACTITIONER

## 2022-06-06 PROCEDURE — 3008F PR BODY MASS INDEX (BMI) DOCUMENTED: ICD-10-PCS | Mod: CPTII,S$GLB,, | Performed by: NURSE PRACTITIONER

## 2022-06-06 PROCEDURE — 99214 OFFICE O/P EST MOD 30 MIN: CPT | Mod: S$GLB,,, | Performed by: NURSE PRACTITIONER

## 2022-06-06 RX ORDER — CLONAZEPAM 1 MG/1
1 TABLET ORAL 2 TIMES DAILY PRN
COMMUNITY
End: 2022-09-06 | Stop reason: SDUPTHER

## 2022-06-06 NOTE — PATIENT INSTRUCTIONS
RTC in 3 months for F/U or sooner if needed.    Keep appts with specialists as scheduled.    Instructed patient to report to nearest ER or call 911 if begins to have difficulty breathing, turning blue, chest pain, B/P < 80/60 or >170/100, palpitations, syncope, extreme weakness, or severe H/A. Patient verbalized understanding.      Patient Education       Attention Deficit Hyperactivity Disorder (ADHD) Discharge Instructions   About this topic   Attention deficit hyperactivity disorder is also called ADHD or ADD. Most often, this starts at a young age. This disorder makes it hard to focus or sit still. People with ADHD may find it hard to make good decisions. Children with ADHD may have trouble in school and at home. Adults may have problems at work. People with ADHD may also have a problem getting along well with others. While there is no cure for ADHD, you and your doctor can work on a plan that is best for you to treat the signs of ADHD.  What care is needed at home?   Ask your doctor what you need to do when you go home. Make sure you ask questions if you do not understand what the doctor says. This way you will know what you need to do.  Try to get enough sleep at night. Most often adults need 7 to 8 hours each night and children need 8 to 10 hours each night. Rest during the day if you are tired.  Eat and sleep at the same times every day.  Have a plan for where to keep things in your home. Use checklists, things to help you remember, and alarms. A list of things you may need to find in a hurry can be helpful. These can help you put things in the right place and manage your time.  Work on getting better at reading and taking notes.  Have a space that is just for work or homework so you will be able to pay attention. This may be an office or a desk facing a wall. Try using headphones so you cannot hear the other noises.  Do one thing at a time. Keep a list of the next things you were planning to do or  say.  Break large jobs or things you have to do into smaller jobs. This will make them easier to finish. Reward yourself along the way.  Have rules that are clear and easy to follow.   Look at where you are the strongest. Give rewards for good behavior, finished schoolwork, or for doing a good job at work.  Do not do too many things that might cause stress.  What follow-up care is needed?   Your doctor may ask you to make visits to the office to check on your progress. Be sure to keep these visits.  Your doctor may send you to a special mental health doctor. This person will talk with you about the problems you are having. Then, you can work together to find ways to help you manage them.  Your doctor may suggest you try talk therapy to help you live well with your ADHD.  What drugs may be needed?   The doctor may order drugs to:  Help lower your worry  Help you to pay attention  Help you be more calm  Help you be less impulsive  Help keep things in your life balanced  Care for low mood  Will physical activity be limited?   Physical activity will help keep your mind and body in good shape. Talk with your doctor about the right amount of activity for you.  What problems could happen?   Feeling badly about yourself  Raise the chances of you hurting yourself, such as injury in a car accident  Not do well in school and work  Trouble making friends or getting along well with others  Raise your chance to abuse alcohol or drugs  What can be done to prevent this health problem?   The cause of ADHD is not clear, but to lower the chance of your child having ADHD:  Do not drink beer, wine, or mixed drinks (alcohol) while you are pregnant.  Do not smoke or use illegal drugs while you are pregnant.  Keep your child away from things like harmful toxins and chemicals.  Keep your child from having too much time in front of computers, TV, and video games.  Get plenty of exercise.  Be more aware of thoughts, emotions, and experiences  each moment. This is mindfulness.  When do I need to call the doctor?   Drugs are not working  Symptoms are getting worse  Teach Back: Helping You Understand   The Teach Back Method helps you understand the information we are giving you. After you talk with the staff, tell them in your own words what you learned. This helps to make sure the staff has described each thing clearly. It also helps to explain things that may have been confusing. Before going home, make sure you can do these:  I can tell you about my condition.  I can tell you ways to help me pay attention.  I can tell you what I will do if I think my drugs are not working.  Where can I learn more?   HelpGuide.org  http://www.helpguide.org/articles/add-adhd/attention-deficit-disorder-adhd-parenting-tips.htm   KidsHealth  http://kidshealth.org/parent/medical/learning/adhd.html   National [place] of [employer] Health  http://www.nimh.nih.gov/health/topics/attention-deficit-hyperactivity-disorder-adhd/index.shtml   Last Reviewed Date   2020-06-14  Consumer Information Use and Disclaimer   This information is not specific medical advice and does not replace information you receive from your health care provider. This is only a brief summary of general information. It does NOT include all information about conditions, illnesses, injuries, tests, procedures, treatments, therapies, discharge instructions or life-style choices that may apply to you. You must talk with your health care provider for complete information about your health and treatment options. This information should not be used to decide whether or not to accept your health care providers advice, instructions or recommendations. Only your health care provider has the knowledge and training to provide advice that is right for you.  Copyright   Copyright © 2021 [employer] and its affiliates and/or licensors. All rights reserved.  Patient Education       Anxiety Discharge Instructions, Adult    About this topic   Anxiety can cause you to feel very worried. It can also cause physical symptoms like chest pain, stomach aches, or trouble sleeping. While mild anxiety is a normal response to stress, it can cause you problems in your everyday life. You may need follow-up care to help manage your anxiety. Anxiety happens in many forms, like:  Being scared all the time that something bad is going to happen. This is generalized anxiety.  Strong bursts of fear where your body has signs that may feel like a heart attack. This is called a panic attack.  Upsetting thoughts that happen often. There is a need to repeat doing certain things to help get rid of the anxiety caused by these thoughts. The thoughts or actions may be about checking on things, touching things, or worry about germs. This is an obsessive-compulsive disorder.  Strong fear of an object, place, or condition. This is a phobia.  Fear that others think bad things about you or being put down by other people. This is social anxiety.  Nightmares, flashbacks, staying away from people, or having panic attacks when reminded of a shocking or hurtful time or place from the past. This is post-traumatic stress.  Anxiety disorder may be treated in many ways. Some kinds of treatment have you talk about your beliefs, fears, and worries. You may learn how certain thoughts or feelings can raise anxiety. You may also learn what steps to take to lower anxiety. Other kinds of treatment may have you look back on a hurtful event, sad memory, or something you are afraid of. The doctor will help you deal with the feelings that you may have. You may learn ways to cope with unwanted events or thoughts by looking at your fears in a way that feels safe.  What care is needed at home?   Ask your doctor what you need to do when you go home. Make sure you ask questions if you do not understand what the doctor says.  Set a time to talk with a counselor about your worries and  feelings. This can help you with your anxiety.  Take care to follow all instructions when you take your medicines.  Limit alcohol and caffeine.  Learn ways to manage stress. Relaxation methods like reflection, deep breathing, and muscle relaxation may be helpful. Things like yoga, exercise, and mike chi are also good.  Talk about your feelings with family members and friends you trust. Talk to someone who can help you see how your thoughts at certain times may raise your anxiety.     What follow-up care is needed?   Your doctor may ask you to make visits to the office to check on your progress. Be sure to keep these visits.  What drugs may be needed?   The doctor may order drugs to help the physical signs of anxiety. Make sure that you take the drugs as taught to you by the doctor. Talk with your doctor about any side effects and ask how long you should take the drug.  Will physical activity be limited?   You may take part in physical activities. Some people are limited because of their anxiety or fear. Talk with your doctor about the right amount of activity for you.  What changes to diet are needed?   Eat a variety of healthy foods and limit drinks with caffeine. You should avoid alcohol, energy drinks, and over-the-counter stimulants.  What problems could happen?   If your anxiety is not treated, it can result in:  Staying away from work or social events  Not being able to do everyday tasks  Keeping away from family and friends  What can be done to prevent this health problem?   Learn what events, people, or things upset you. Limit your contact with them.  Talk about your feelings. Talk to someone who can help you see how your thoughts at certain times may raise your anxiety.  Seek support from your friends and family. Find someone who calms you down. Ask if you can call them when you are getting anxious.  When do I need to call the doctor?   You feel you may harm yourself or someone else.  You can also call a  mental health hotline for help.  You have any physical symptoms, such as chest pain, trouble breathing, or severe belly pain, that could be a sign of a serious problem.  If you are short of breath.  If you do not feel like you can be alone.  Teach Back: Helping You Understand   The Teach Back Method helps you understand the information we are giving you. After you talk with the staff, tell them in your own words what you learned. This helps to make sure the staff has described each thing clearly. It also helps to explain things that may have been confusing. Before going home, make sure you can do these:  I can tell you about my condition and the drugs I need to take.  I can tell you what may help lower my anxiety.  I can tell you what I will do if it is hard to breathe or I have chest pain.  I can tell you what I will do if I do not feel safe or cannot be alone.  Where can I learn more?   [employer]  https://www.gricelda.org/Learn-More/Mental-Health-Conditions/Anxiety-Disorders   National Health Service  https://www.nhs.uk/conditions/generalised-anxiety-disorder/symptoms/   National [place] [facility] ? [facility]  https://www.sudha.nih.gov/health/relieving-stress-anxiety-ysfzetmht-woagcztvrr-cuwkluerzc   [place] of Mental Health  http://www.nimh.nih.gov/health/publications/anxiety-disorders/complete-index.shtml   Last Reviewed Date   2021-06-08  Consumer Information Use and Disclaimer   This information is not specific medical advice and does not replace information you receive from your health care provider. This is only a brief summary of general information. It does NOT include all information about conditions, illnesses, injuries, tests, procedures, treatments, therapies, discharge instructions or life-style choices that may apply to you. You must talk with your health care provider for complete information about your health and treatment options. This information should not be used to decide whether or not  to accept your health care providers advice, instructions or recommendations. Only your health care provider has the knowledge and training to provide advice that is right for you.  Copyright   Copyright © 2021 [employer] and its affiliates and/or licensors. All rights reserved.

## 2022-06-06 NOTE — PROGRESS NOTES
Subjective:       Patient ID: Isabella Santana is a 56 y.o. female.    Chief Complaint: Follow-up    57 y/o female presents for F/U.    Feels well today but she has been stressed lately due to work and life. She has also been having trouble sleeping and staying sleep.    Anxiety - chronic, usually controlled on clonazepam as needed. Only takes when she needs it. Denies se/ar to medication. Denies being depressed. Denies SI/HI.    ADHD - takes Vyvanse 50 mg daily and it is working well. No s/e of CP, palpitations, syncope, Denies se/at to medication.  checked and is appropriate.    Had a total hysterectomy in 2012 by Dr. Jordan and takes Estradiol daily which works well.     UTD with COVID and FLU vaccines.    No other issues or concerns.            Past Medical History:   Diagnosis Date    ADHD (attention deficit hyperactivity disorder)     Anxiety     Hormone deficiency     Insomnia        Past Surgical History:   Procedure Laterality Date    BREAST BIOPSY Left     HYSTERECTOMY      WISDOM TOOTH EXTRACTION         Family History   Problem Relation Age of Onset    Diabetes Mother     Cancer Father     Heart disease Father     Cancer Sister        Social History     Tobacco Use    Smoking status: Former Smoker     Types: Vaping w/o nicotine    Smokeless tobacco: Never Used   Substance Use Topics    Alcohol use: Yes    Drug use: Never       Patient Active Problem List   Diagnosis    Attention deficit hyperactivity disorder    Breast lump    Primary insomnia    Menopausal disorder    Generalized anxiety disorder       Immunization History   Administered Date(s) Administered    COVID-19, MRNA, LN-S, PF (Pfizer) (Purple Cap) 07/27/2021, 08/17/2021    Influenza - Quadrivalent - PF *Preferred* (6 months and older) 10/05/2020, 09/23/2021           Review of Systems   Constitutional: Negative for activity change, appetite change, chills, diaphoresis, fatigue and fever.   HENT: Negative for tinnitus  "and trouble swallowing.    Eyes: Negative for visual disturbance.   Respiratory: Negative for cough, chest tightness, shortness of breath and wheezing.    Cardiovascular: Negative for chest pain, palpitations and leg swelling.   Gastrointestinal: Negative for abdominal distention and abdominal pain.   Genitourinary: Negative for decreased urine volume, dysuria, flank pain, frequency, hematuria and urgency.   Musculoskeletal: Negative for gait problem, neck pain and neck stiffness.   Skin: Negative for color change, pallor and rash.   Neurological: Negative for dizziness, syncope, weakness, light-headedness and headaches.   Psychiatric/Behavioral: Positive for sleep disturbance. Negative for behavioral problems, confusion, decreased concentration, dysphoric mood and suicidal ideas. The patient is not nervous/anxious.      Objective:     Vitals:    06/06/22 1635   BP: 108/74   BP Location: Left arm   Patient Position: Sitting   BP Method: Large (Automatic)   Pulse: 81   Resp: 18   SpO2: 100%   Weight: 56.7 kg (125 lb)   Height: 5' 1" (1.549 m)       Physical Exam  Vitals and nursing note reviewed.   Constitutional:       Appearance: Normal appearance.   HENT:      Head: Normocephalic and atraumatic.      Nose: Nose normal.      Mouth/Throat:      Mouth: Mucous membranes are moist. Mucous membranes are not pale, not dry and not cyanotic.      Pharynx: Oropharynx is clear.   Eyes:      Extraocular Movements: Extraocular movements intact.      Conjunctiva/sclera: Conjunctivae normal.      Pupils: Pupils are equal, round, and reactive to light.   Neck:      Vascular: No JVD.   Cardiovascular:      Rate and Rhythm: Normal rate and regular rhythm.   Pulmonary:      Effort: Pulmonary effort is normal.      Breath sounds: Normal breath sounds.   Abdominal:      General: There is no distension.      Tenderness: There is no abdominal tenderness.   Musculoskeletal:         General: Normal range of motion.      Cervical back: " Normal range of motion and neck supple.      Right lower leg: No edema.      Left lower leg: No edema.   Skin:     General: Skin is warm and dry.      Capillary Refill: Capillary refill takes less than 2 seconds.   Neurological:      Mental Status: She is alert and oriented to person, place, and time.      Gait: Gait is intact.   Psychiatric:         Mood and Affect: Affect normal. Mood is anxious.         Behavior: Behavior normal. Behavior is cooperative.         Thought Content: Thought content normal. Thought content does not include homicidal or suicidal ideation. Thought content does not include homicidal or suicidal plan.         Judgment: Judgment normal.         Office Visit on 03/04/2022   Component Date Value Ref Range Status    WBC 03/04/2022 4.83  4.3 - 10.8 X 10 3/ul Final    RBC 03/04/2022 4.98  4.2 - 5.4 X 10 6/ul Final    RDW-SD 03/04/2022 42.9  37 - 54 fl Final    Hemoglobin 03/04/2022 13.5  12 - 16 g/dL Final    Hematocrit 03/04/2022 42.1  37 - 47 % Final    MCV 03/04/2022 84.5  82 - 100 fl Final    MCH 03/04/2022 27.1  27 - 32 pg Final    MCHC 03/04/2022 32.1  32 - 36 g/dL Final    Platelets 03/04/2022 274  135 - 400 X 10 3/ul Final    Neutrophils 03/04/2022 71.3 (A) 34 - 71.1 % Final    Lymphocytes 03/04/2022 21.5  19.3 - 53.1 % Final    Monocytes 03/04/2022 4.3 (A) 4.7 - 12.5 % Final    Eosinophils 03/04/2022 2.1  0.7 - 7.0 % Final    Basophils 03/04/2022 0.6  0.2 - 1.2 % Final    Neutrophils Absolute 03/04/2022 3.44  2.15 - 7.56 X 10 3/ul Final    Lymphocytes Absolute 03/04/2022 1.04  0.86 - 4.75 X 10 3/ul Final    Monocytes Absolute 03/04/2022 0.21 (A) 0.22 - 1.08 X 10 3/ul Final    Eosinophils Absolute 03/04/2022 0.10  0.04 - 0.54 X 10 3/ul Final    Basophils Absolute 03/04/2022 0.03  0.00 - 0.22 X 10 3/ul Final    Immature Granulocytes Absolute 03/04/2022 0.01  0 - 0.04 X 10 3/ul Final    Immature Granulocytes 03/04/2022 0.2  0 - 0.5 % Final    nRBC# 03/04/2022 0.0  0  - 0.2 /100 WBC Final    nRBC Count Absolute 03/04/2022 0.000  0 - 0.012 x 10 3/ul Final    Glucose 03/04/2022 93  74 - 106 mg/dL Final    BUN 03/04/2022 7.7  6 - 20 mg/dL Final    Creatinine 03/04/2022 0.81  0.50 - 0.90 mg/dL Final    AST 03/04/2022 17  0 - 32 U/L Final    ALT (SGPT) 03/04/2022 18  0 - 33 U/L Final    Alkaline Phosphatase 03/04/2022 62  35 - 105 U/L Final    Calcium 03/04/2022 10.0  8.6 - 10.2 mg/dL Final    Protein, Total 03/04/2022 7.1  6.4 - 8.3 g/dL Final    Albumin 03/04/2022 5.1  3.5 - 5.2 g/dL Final    BILIRUBIN, TOTAL 03/04/2022 0.37  0.00 - 1.20 mg/dL Final    Sodium 03/04/2022 140  136 - 145 mmol/L Final    Potassium 03/04/2022 4.4  3.5 - 5.1 mmol/L Final    Chloride 03/04/2022 101  98 - 107 mmol/L Final    CO2 03/04/2022 28  22 - 29 mmol/L Final    Globulin 03/04/2022 2.0  1.5 - 4.5 g/dL Final    Albumin/Globulin Ratio 03/04/2022 2.6  1.0 - 2.7 Final    BUN/Creatinine Ratio 03/04/2022 9.5  6 - 20 Final    GFR ESTIMATION 03/04/2022 72.62  >60.00 Final    Anion Gap 03/04/2022 11.0  8.0 - 17.0 mmol/L Final    TSH w/reflex to FT4 03/04/2022 1.68  0.27 - 4.20 uIU/mL Final    Cholesterol 03/04/2022 238 (A) 100 - 200 mg/dL Final    Triglycerides 03/04/2022 115  0 - 150 mg/dL Final    HDL 03/04/2022 132  >60 mg/dL Final    LDL Cholesterol 03/04/2022 83.0  0 - 100 mg/dL Final    LDL/HDL Ratio 03/04/2022 0.6 (A) 1 - 3 Final    VITAMIN D (25OHD) 03/04/2022 32.4  >30 ng/mL Final   Clinical Support on 01/03/2022   Component Date Value Ref Range Status    POC Rapid COVID 01/03/2022 Positive (A) Negative Final     Acceptable 01/03/2022 Yes   Final         Assessment:      1. Attention deficit hyperactivity disorder (ADHD), combined type    2. Primary insomnia    3. Menopausal disorder          Plan:     Attention deficit hyperactivity disorder (ADHD), combined type  Comments:  Continue Vyvanse daily, F/U in 3 months    Primary insomnia  Comments:  Continue  amitriptyline 25 mg nightly, use clonazepam as needed    Menopausal disorder  Comments:  Continue estradiol daily         Current Outpatient Medications   Medication Sig Dispense Refill    amitriptyline (ELAVIL) 25 MG tablet Take 1 tablet (25 mg total) by mouth every evening. 30 tablet 2    clonazePAM (KLONOPIN) 1 MG tablet Take 1 mg by mouth 2 (two) times daily as needed.      estradioL (ESTRACE) 1 MG tablet Take 1 tablet (1 mg total) by mouth once daily. 90 tablet 1    lisdexamfetamine (VYVANSE) 50 MG capsule Take 1 capsule (50 mg total) by mouth every morning. 30 capsule 0     No current facility-administered medications for this visit.       Medications Discontinued During This Encounter   Medication Reason    clonazePAM (KLONOPIN) 1 MG tablet Patient no longer taking    lisdexamfetamine (VYVANSE) 50 MG capsule Change in Dosage Form       Health Maintenance   Topic Date Due    Mammogram  09/16/2020    Lipid Panel  03/04/2027    Hepatitis C Screening  Completed    TETANUS VACCINE  Discontinued       Patient Instructions   RTC in 3 months for F/U or sooner if needed.    Keep appts with specialists as scheduled.    Instructed patient to report to nearest ER or call 911 if begins to have difficulty breathing, turning blue, chest pain, B/P < 80/60 or >170/100, palpitations, syncope, extreme weakness, or severe H/A. Patient verbalized understanding.      Patient Education       Attention Deficit Hyperactivity Disorder (ADHD) Discharge Instructions   About this topic   Attention deficit hyperactivity disorder is also called ADHD or ADD. Most often, this starts at a young age. This disorder makes it hard to focus or sit still. People with ADHD may find it hard to make good decisions. Children with ADHD may have trouble in school and at home. Adults may have problems at work. People with ADHD may also have a problem getting along well with others. While there is no cure for ADHD, you and your doctor can work on  a plan that is best for you to treat the signs of ADHD.  What care is needed at home?   · Ask your doctor what you need to do when you go home. Make sure you ask questions if you do not understand what the doctor says. This way you will know what you need to do.  · Try to get enough sleep at night. Most often adults need 7 to 8 hours each night and children need 8 to 10 hours each night. Rest during the day if you are tired.  · Eat and sleep at the same times every day.  · Have a plan for where to keep things in your home. Use checklists, things to help you remember, and alarms. A list of things you may need to find in a hurry can be helpful. These can help you put things in the right place and manage your time.  · Work on getting better at reading and taking notes.  · Have a space that is just for work or homework so you will be able to pay attention. This may be an office or a desk facing a wall. Try using headphones so you cannot hear the other noises.  · Do one thing at a time. Keep a list of the next things you were planning to do or say.  · Break large jobs or things you have to do into smaller jobs. This will make them easier to finish. Reward yourself along the way.  · Have rules that are clear and easy to follow.   · Look at where you are the strongest. Give rewards for good behavior, finished schoolwork, or for doing a good job at work.  · Do not do too many things that might cause stress.  What follow-up care is needed?   · Your doctor may ask you to make visits to the office to check on your progress. Be sure to keep these visits.  · Your doctor may send you to a special mental health doctor. This person will talk with you about the problems you are having. Then, you can work together to find ways to help you manage them.  · Your doctor may suggest you try talk therapy to help you live well with your ADHD.  What drugs may be needed?   The doctor may order drugs to:  · Help lower your worry  · Help you to  pay attention  · Help you be more calm  · Help you be less impulsive  · Help keep things in your life balanced  · Care for low mood  Will physical activity be limited?   Physical activity will help keep your mind and body in good shape. Talk with your doctor about the right amount of activity for you.  What problems could happen?   · Feeling badly about yourself  · Raise the chances of you hurting yourself, such as injury in a car accident  · Not do well in school and work  · Trouble making friends or getting along well with others  · Raise your chance to abuse alcohol or drugs  What can be done to prevent this health problem?   The cause of ADHD is not clear, but to lower the chance of your child having ADHD:  · Do not drink beer, wine, or mixed drinks (alcohol) while you are pregnant.  · Do not smoke or use illegal drugs while you are pregnant.  · Keep your child away from things like harmful toxins and chemicals.  · Keep your child from having too much time in front of computers, TV, and video games.  · Get plenty of exercise.  · Be more aware of thoughts, emotions, and experiences each moment. This is mindfulness.  When do I need to call the doctor?   · Drugs are not working  · Symptoms are getting worse  Teach Back: Helping You Understand   The Teach Back Method helps you understand the information we are giving you. After you talk with the staff, tell them in your own words what you learned. This helps to make sure the staff has described each thing clearly. It also helps to explain things that may have been confusing. Before going home, make sure you can do these:  · I can tell you about my condition.  · I can tell you ways to help me pay attention.  · I can tell you what I will do if I think my drugs are not working.  Where can I learn more?   HelpGuide.org  http://www.helpguide.org/articles/add-adhd/attention-deficit-disorder-adhd-parenting-tips.htm    KidsHealth  http://kidshealth.org/parent/medical/learning/adhd.html   [place] [employer]  http://www.nimh.nih.gov/health/topics/attention-deficit-hyperactivity-disorder-adhd/index.shtml   Last Reviewed Date   2020-06-14  Consumer Information Use and Disclaimer   This information is not specific medical advice and does not replace information you receive from your health care provider. This is only a brief summary of general information. It does NOT include all information about conditions, illnesses, injuries, tests, procedures, treatments, therapies, discharge instructions or life-style choices that may apply to you. You must talk with your health care provider for complete information about your health and treatment options. This information should not be used to decide whether or not to accept your health care providers advice, instructions or recommendations. Only your health care provider has the knowledge and training to provide advice that is right for you.  Copyright   Copyright © 2021 [employer], [employer]. and its affiliates and/or licensors. All rights reserved.  Patient Education       Anxiety Discharge Instructions, Adult   About this topic   Anxiety can cause you to feel very worried. It can also cause physical symptoms like chest pain, stomach aches, or trouble sleeping. While mild anxiety is a normal response to stress, it can cause you problems in your everyday life. You may need follow-up care to help manage your anxiety. Anxiety happens in many forms, like:  · Being scared all the time that something bad is going to happen. This is generalized anxiety.  · Strong bursts of fear where your body has signs that may feel like a heart attack. This is called a panic attack.  · Upsetting thoughts that happen often. There is a need to repeat doing certain things to help get rid of the anxiety caused by these thoughts. The thoughts or actions may be about checking on things, touching things, or  worry about germs. This is an obsessive-compulsive disorder.  · Strong fear of an object, place, or condition. This is a phobia.  · Fear that others think bad things about you or being put down by other people. This is social anxiety.  · Nightmares, flashbacks, staying away from people, or having panic attacks when reminded of a shocking or hurtful time or place from the past. This is post-traumatic stress.  Anxiety disorder may be treated in many ways. Some kinds of treatment have you talk about your beliefs, fears, and worries. You may learn how certain thoughts or feelings can raise anxiety. You may also learn what steps to take to lower anxiety. Other kinds of treatment may have you look back on a hurtful event, sad memory, or something you are afraid of. The doctor will help you deal with the feelings that you may have. You may learn ways to cope with unwanted events or thoughts by looking at your fears in a way that feels safe.  What care is needed at home?   · Ask your doctor what you need to do when you go home. Make sure you ask questions if you do not understand what the doctor says.  · Set a time to talk with a counselor about your worries and feelings. This can help you with your anxiety.  · Take care to follow all instructions when you take your medicines.  · Limit alcohol and caffeine.  · Learn ways to manage stress. Relaxation methods like reflection, deep breathing, and muscle relaxation may be helpful. Things like yoga, exercise, and mike chi are also good.  · Talk about your feelings with family members and friends you trust. Talk to someone who can help you see how your thoughts at certain times may raise your anxiety.     What follow-up care is needed?   Your doctor may ask you to make visits to the office to check on your progress. Be sure to keep these visits.  What drugs may be needed?   The doctor may order drugs to help the physical signs of anxiety. Make sure that you take the drugs as  taught to you by the doctor. Talk with your doctor about any side effects and ask how long you should take the drug.  Will physical activity be limited?   You may take part in physical activities. Some people are limited because of their anxiety or fear. Talk with your doctor about the right amount of activity for you.  What changes to diet are needed?   Eat a variety of healthy foods and limit drinks with caffeine. You should avoid alcohol, energy drinks, and over-the-counter stimulants.  What problems could happen?   If your anxiety is not treated, it can result in:  · Staying away from work or social events  · Not being able to do everyday tasks  · Keeping away from family and friends  What can be done to prevent this health problem?   · Learn what events, people, or things upset you. Limit your contact with them.  · Talk about your feelings. Talk to someone who can help you see how your thoughts at certain times may raise your anxiety.  · Seek support from your friends and family. Find someone who calms you down. Ask if you can call them when you are getting anxious.  When do I need to call the doctor?   · You feel you may harm yourself or someone else.  · You can also call a mental health hotline for help.  · You have any physical symptoms, such as chest pain, trouble breathing, or severe belly pain, that could be a sign of a serious problem.  · If you are short of breath.  · If you do not feel like you can be alone.  Teach Back: Helping You Understand   The Teach Back Method helps you understand the information we are giving you. After you talk with the staff, tell them in your own words what you learned. This helps to make sure the staff has described each thing clearly. It also helps to explain things that may have been confusing. Before going home, make sure you can do these:  · I can tell you about my condition and the drugs I need to take.  · I can tell you what may help lower my anxiety.  · I can tell  you what I will do if it is hard to breathe or I have chest pain.  · I can tell you what I will do if I do not feel safe or cannot be alone.  Where can I learn more?   [employer]  https://www.gricelda.org/Learn-More/Mental-Health-Conditions/Anxiety-Disorders   National Health Service  https://www.nhs.uk/conditions/generalised-anxiety-disorder/symptoms/   National [place] of [facility] ? [facility]  https://www.sudha.nih.gov/health/relieving-stress-anxiety-xikgfcnll-mrymqlijdu-ibifjjsdih   National [place] of Mental Health  http://www.[facility].nih.gov/health/publications/anxiety-disorders/complete-index.shtml   Last Reviewed Date   2021-06-08  Consumer Information Use and Disclaimer   This information is not specific medical advice and does not replace information you receive from your health care provider. This is only a brief summary of general information. It does NOT include all information about conditions, illnesses, injuries, tests, procedures, treatments, therapies, discharge instructions or life-style choices that may apply to you. You must talk with your health care provider for complete information about your health and treatment options. This information should not be used to decide whether or not to accept your health care providers advice, instructions or recommendations. Only your health care provider has the knowledge and training to provide advice that is right for you.  Copyright   Copyright © 2021 [employer], [employer]. and its affiliates and/or licensors. All rights reserved.        Risks, benefits, and alternatives discussed with patient, Patient verbalized understanding of discussed plan of care. Asked patient if any further questions, answered no.    Future Appointments   Date Time Provider Department Center   9/6/2022  4:20 PM Carline Camacho NP [facility] [address]              Carline Camacho NP

## 2022-06-14 ENCOUNTER — PATIENT MESSAGE (OUTPATIENT)
Dept: PRIMARY CARE CLINIC | Facility: CLINIC | Age: 57
End: 2022-06-14
Payer: COMMERCIAL

## 2022-06-14 DIAGNOSIS — F90.2 ATTENTION DEFICIT HYPERACTIVITY DISORDER (ADHD), COMBINED TYPE: Primary | ICD-10-CM

## 2022-06-14 RX ORDER — LISDEXAMFETAMINE DIMESYLATE 50 MG/1
50 CAPSULE ORAL EVERY MORNING
Qty: 30 CAPSULE | Refills: 0 | Status: SHIPPED | OUTPATIENT
Start: 2022-06-14 | End: 2022-07-05 | Stop reason: SDUPTHER

## 2022-07-05 ENCOUNTER — PATIENT MESSAGE (OUTPATIENT)
Dept: PRIMARY CARE CLINIC | Facility: CLINIC | Age: 57
End: 2022-07-05
Payer: COMMERCIAL

## 2022-07-05 DIAGNOSIS — F90.2 ATTENTION DEFICIT HYPERACTIVITY DISORDER (ADHD), COMBINED TYPE: ICD-10-CM

## 2022-07-07 RX ORDER — LISDEXAMFETAMINE DIMESYLATE 50 MG/1
50 CAPSULE ORAL EVERY MORNING
Qty: 30 CAPSULE | Refills: 0 | Status: SHIPPED | OUTPATIENT
Start: 2022-07-07 | End: 2022-08-12 | Stop reason: SDUPTHER

## 2022-07-30 DIAGNOSIS — F51.01 PRIMARY INSOMNIA: ICD-10-CM

## 2022-08-01 RX ORDER — AMITRIPTYLINE HYDROCHLORIDE 25 MG/1
TABLET, FILM COATED ORAL
Qty: 90 TABLET | Refills: 1 | Status: SHIPPED | OUTPATIENT
Start: 2022-08-01 | End: 2022-09-06 | Stop reason: ALTCHOICE

## 2022-08-12 DIAGNOSIS — F90.2 ATTENTION DEFICIT HYPERACTIVITY DISORDER (ADHD), COMBINED TYPE: ICD-10-CM

## 2022-08-15 RX ORDER — LISDEXAMFETAMINE DIMESYLATE 50 MG/1
50 CAPSULE ORAL EVERY MORNING
Qty: 30 CAPSULE | Refills: 0 | Status: SHIPPED | OUTPATIENT
Start: 2022-08-15 | End: 2022-09-06 | Stop reason: SDUPTHER

## 2022-09-06 ENCOUNTER — OFFICE VISIT (OUTPATIENT)
Dept: PRIMARY CARE CLINIC | Facility: CLINIC | Age: 57
End: 2022-09-06
Payer: COMMERCIAL

## 2022-09-06 VITALS
DIASTOLIC BLOOD PRESSURE: 79 MMHG | HEIGHT: 61 IN | OXYGEN SATURATION: 97 % | WEIGHT: 123 LBS | HEART RATE: 93 BPM | RESPIRATION RATE: 18 BRPM | SYSTOLIC BLOOD PRESSURE: 113 MMHG | BODY MASS INDEX: 23.22 KG/M2

## 2022-09-06 DIAGNOSIS — N95.9 MENOPAUSAL DISORDER: ICD-10-CM

## 2022-09-06 DIAGNOSIS — F90.2 ATTENTION DEFICIT HYPERACTIVITY DISORDER (ADHD), COMBINED TYPE: ICD-10-CM

## 2022-09-06 DIAGNOSIS — F51.01 PRIMARY INSOMNIA: ICD-10-CM

## 2022-09-06 DIAGNOSIS — E78.49 OTHER HYPERLIPIDEMIA: ICD-10-CM

## 2022-09-06 DIAGNOSIS — F41.1 GENERALIZED ANXIETY DISORDER: ICD-10-CM

## 2022-09-06 PROCEDURE — 1159F PR MEDICATION LIST DOCUMENTED IN MEDICAL RECORD: ICD-10-PCS | Mod: CPTII,S$GLB,, | Performed by: NURSE PRACTITIONER

## 2022-09-06 PROCEDURE — 3008F PR BODY MASS INDEX (BMI) DOCUMENTED: ICD-10-PCS | Mod: CPTII,S$GLB,, | Performed by: NURSE PRACTITIONER

## 2022-09-06 PROCEDURE — 3078F PR MOST RECENT DIASTOLIC BLOOD PRESSURE < 80 MM HG: ICD-10-PCS | Mod: CPTII,S$GLB,, | Performed by: NURSE PRACTITIONER

## 2022-09-06 PROCEDURE — 3008F BODY MASS INDEX DOCD: CPT | Mod: CPTII,S$GLB,, | Performed by: NURSE PRACTITIONER

## 2022-09-06 PROCEDURE — 1160F PR REVIEW ALL MEDS BY PRESCRIBER/CLIN PHARMACIST DOCUMENTED: ICD-10-PCS | Mod: CPTII,S$GLB,, | Performed by: NURSE PRACTITIONER

## 2022-09-06 PROCEDURE — 1159F MED LIST DOCD IN RCRD: CPT | Mod: CPTII,S$GLB,, | Performed by: NURSE PRACTITIONER

## 2022-09-06 PROCEDURE — 99214 OFFICE O/P EST MOD 30 MIN: CPT | Mod: S$GLB,,, | Performed by: NURSE PRACTITIONER

## 2022-09-06 PROCEDURE — 3074F SYST BP LT 130 MM HG: CPT | Mod: CPTII,S$GLB,, | Performed by: NURSE PRACTITIONER

## 2022-09-06 PROCEDURE — 3074F PR MOST RECENT SYSTOLIC BLOOD PRESSURE < 130 MM HG: ICD-10-PCS | Mod: CPTII,S$GLB,, | Performed by: NURSE PRACTITIONER

## 2022-09-06 PROCEDURE — 1160F RVW MEDS BY RX/DR IN RCRD: CPT | Mod: CPTII,S$GLB,, | Performed by: NURSE PRACTITIONER

## 2022-09-06 PROCEDURE — 3078F DIAST BP <80 MM HG: CPT | Mod: CPTII,S$GLB,, | Performed by: NURSE PRACTITIONER

## 2022-09-06 PROCEDURE — 99214 PR OFFICE/OUTPT VISIT, EST, LEVL IV, 30-39 MIN: ICD-10-PCS | Mod: S$GLB,,, | Performed by: NURSE PRACTITIONER

## 2022-09-06 RX ORDER — CLONAZEPAM 1 MG/1
1 TABLET ORAL 2 TIMES DAILY PRN
Qty: 60 TABLET | Refills: 2 | Status: SHIPPED | OUTPATIENT
Start: 2022-09-06 | End: 2022-12-12 | Stop reason: SDUPTHER

## 2022-09-06 RX ORDER — LISDEXAMFETAMINE DIMESYLATE 50 MG/1
50 CAPSULE ORAL EVERY MORNING
Qty: 30 CAPSULE | Refills: 0 | Status: SHIPPED | OUTPATIENT
Start: 2022-09-13 | End: 2022-09-26 | Stop reason: SDUPTHER

## 2022-09-06 NOTE — PATIENT INSTRUCTIONS
RTC in 3 months for F/U or sooner if needed.    Keep appts with specialists as scheduled.    Instructed patient to report to nearest ER or call 911 if begins to have difficulty breathing, turning blue, chest pain, B/P < 80/60 or >170/100, palpitations, syncope, extreme weakness, or severe H/A. Patient verbalized understanding.      Patient Education       Anxiety Discharge Instructions, Adult   About this topic   Anxiety can cause you to feel very worried. It can also cause physical symptoms like chest pain, stomach aches, or trouble sleeping. While mild anxiety is a normal response to stress, it can cause you problems in your everyday life. You may need follow-up care to help manage your anxiety. Anxiety happens in many forms, like:  Being scared all the time that something bad is going to happen. This is generalized anxiety.  Strong bursts of fear where your body has signs that may feel like a heart attack. This is called a panic attack.  Upsetting thoughts that happen often. There is a need to repeat doing certain things to help get rid of the anxiety caused by these thoughts. The thoughts or actions may be about checking on things, touching things, or worry about germs. This is an obsessive-compulsive disorder.  Strong fear of an object, place, or condition. This is a phobia.  Fear that others think bad things about you or being put down by other people. This is social anxiety.  Nightmares, flashbacks, staying away from people, or having panic attacks when reminded of a shocking or hurtful time or place from the past. This is post-traumatic stress.  Anxiety disorder may be treated in many ways. Some kinds of treatment have you talk about your beliefs, fears, and worries. You may learn how certain thoughts or feelings can raise anxiety. You may also learn what steps to take to lower anxiety. Other kinds of treatment may have you look back on a hurtful event, sad memory, or something you are afraid of. The doctor  will help you deal with the feelings that you may have. You may learn ways to cope with unwanted events or thoughts by looking at your fears in a way that feels safe.  What care is needed at home?   Ask your doctor what you need to do when you go home. Make sure you ask questions if you do not understand what the doctor says.  Set a time to talk with a counselor about your worries and feelings. This can help you with your anxiety.  Take care to follow all instructions when you take your medicines.  Limit alcohol and caffeine.  Learn ways to manage stress. Relaxation methods like reflection, deep breathing, and muscle relaxation may be helpful. Things like yoga, exercise, and mike chi are also good.  Talk about your feelings with family members and friends you trust. Talk to someone who can help you see how your thoughts at certain times may raise your anxiety.     What follow-up care is needed?   Your doctor may ask you to make visits to the office to check on your progress. Be sure to keep these visits.  What drugs may be needed?   The doctor may order drugs to help the physical signs of anxiety. Make sure that you take the drugs as taught to you by the doctor. Talk with your doctor about any side effects and ask how long you should take the drug.  Will physical activity be limited?   You may take part in physical activities. Some people are limited because of their anxiety or fear. Talk with your doctor about the right amount of activity for you.  What changes to diet are needed?   Eat a variety of healthy foods and limit drinks with caffeine. You should avoid alcohol, energy drinks, and over-the-counter stimulants.  What problems could happen?   If your anxiety is not treated, it can result in:  Staying away from work or social events  Not being able to do everyday tasks  Keeping away from family and friends  What can be done to prevent this health problem?   Learn what events, people, or things upset you. Limit  your contact with them.  Talk about your feelings. Talk to someone who can help you see how your thoughts at certain times may raise your anxiety.  Seek support from your friends and family. Find someone who calms you down. Ask if you can call them when you are getting anxious.  When do I need to call the doctor?   You feel you may harm yourself or someone else.  You can also call a mental health hotline for help.  You have any physical symptoms, such as chest pain, trouble breathing, or severe belly pain, that could be a sign of a serious problem.  If you are short of breath.  If you do not feel like you can be alone.  Teach Back: Helping You Understand   The Teach Back Method helps you understand the information we are giving you. After you talk with the staff, tell them in your own words what you learned. This helps to make sure the staff has described each thing clearly. It also helps to explain things that may have been confusing. Before going home, make sure you can do these:  I can tell you about my condition and the drugs I need to take.  I can tell you what may help lower my anxiety.  I can tell you what I will do if it is hard to breathe or I have chest pain.  I can tell you what I will do if I do not feel safe or cannot be alone.  Where can I learn more?   [facility]  https://www.gricelda.org/Learn-More/Mental-Health-Conditions/Anxiety-Disorders   National [facility] Service  https://www.nhs.uk/conditions/generalised-anxiety-disorder/symptoms/   [employer] [place] [facility] ? [facility]  https://www.sudha.nih.gov/health/relieving-stress-anxiety-ixjqrmnwd-qvtlmtqnxt-qfgsdnaltq   [employer] [place] of Mental Health  http://www.nimh.nih.gov/health/publications/anxiety-disorders/complete-index.shtml   Last Reviewed Date   2021-06-08  Consumer Information Use and Disclaimer   This information is not specific medical advice and does not replace information you receive from your health care provider. This is only a brief  "summary of general information. It does NOT include all information about conditions, illnesses, injuries, tests, procedures, treatments, therapies, discharge instructions or life-style choices that may apply to you. You must talk with your health care provider for complete information about your health and treatment options. This information should not be used to decide whether or not to accept your health care providers advice, instructions or recommendations. Only your health care provider has the knowledge and training to provide advice that is right for you.  Copyright   Copyright © 2021 [employer] and its affiliates and/or licensors. All rights reserved.    Patient Education       Low Cholesterol, Saturated Fat, and Trans Fat Diet   About this topic   Cholesterol, saturated fat, and trans fat are in many foods. These may raise your blood cholesterol levels. If your cholesterol is too high, this can cause health problems in your heart, liver, kidneys, and even your eyes. The key to lowering your risk of heart problems is to lower your bad fat intake.  Saturated fats and trans fats are the bad fats. These fats clog your arteries and raise your bad cholesterol. Saturated fats and trans fats are solid fats at room temperature. Saturated fats are animal fats. Trans fats are manmade fats. They add flavor to a lot of packaged foods. Staying away from saturated and trans fats will help your heart.  When you do eat foods with fat, make sure they have the good fats. Monounsaturated and polyunsaturated fats are good fats. These fats help raise your good cholesterol and protect your heart.  General   How to Lower Fat and Cholesterol in Your Diet   Read the labels of the foods you buy from the market to find out how much fat is present. Under 5% of total fat on a label means it is "low fat". Over 20% of total fat on a label means it is high fat.  Eat high fiber foods, like soluble fiber. This type of fiber helps " lower cholesterol in the body. Choose oatmeal, fruits (like apples), beans, and nuts to get the most soluble fiber.  Eat foods high in omega-3 fatty acids like sha seeds, walnuts, salmon, tuna, trout, herring, flaxseed, and soybeans. These foods help keep the heart healthy.  Limit your bad fat and oil intake.  Stay away from butter, stick margarine, shortening, lard, and palm and coconut oil. Pick plant-based spreads instead.  Limit mayonnaise, salad dressings, gravies, and sauces, unless it is made from low-fat ingredients.  Limit chocolate.  Do not eat high-fat processed foods like hot dogs, luna, sausage, ham and other luncheon meats high in fat, and some frozen foods. Pick fish, chicken, turkey, and lean meats instead.  Eat more dried beans, lentils, and tofu to get your protein.  Do not eat organ meats, like liver.  Choose nonfat or low-fat milk, yogurt, and cheese.  Use light or fat-free cream cheese and sour cream.  Eat lots of fruits and vegetables.  Pick whole grain breads, cereals, pastas, and rice.  Do not eat snacks that are high in fats like granola, cookies, pies, pastries, doughnuts, and croissants.  Stay away from deep fried foods.  Help When Cooking   Remove the fat portion of meats and the skin from poultry before cooking.  Bake, broil, grill, poach, or roast poultry, fish, and lean meats.  Drain and throw away the fat that drains out of meat as you cook it.  Try to add little or no fat to foods.  Use olive or canola oil for cooking or baking.  Steam your vegetables.  Use herbs or no-oil marinades to flavor foods.         Who should use this diet?   This diet is for people who are at high risk of getting health problems like heart disease, high blood pressure, diabetes, and others. This diet is also good for all people to follow to keep your heart healthy.  What foods are good to eat?   Foods with good fats are:  Canola, peanut, and olive oil  Safflower, soybean, and corn oil  Walnuts, almonds,  cashews, and peanuts  Pumpkin and sunflower seeds  Rochelle and tuna  Tofu  Soymilk  Avocado  What foods should be limited or avoided?   Stay away from these types of foods that have saturated fats:  Whole fat dairy products like cheese, ice cream, whole milk, and cream  Palm and coconut oils  High-fat meats like beef, lamb, poultry with the skin, luna, and sausage  Butter and lard  Stay away from these types of foods that may have trans fat:  Cookies, cakes, candy, doughnuts, baked goods, muffins, pizza dough, and pie crusts that are packaged  Fried foods  Frozen dinners  Chips and crackers  Microwave popcorn  Stick margarine and vegetable shortenings  Helpful tips   To help stay away from saturated fat:  Pick lean cuts of meat  Take the skin off chicken and turkey or pick skinless  Pick low-fat cheese, milk, and ice cream  Use liquid oils when cooking and baking, such as olive oil and canola oil  To help stay away from trans fat:  Look at your labels. Choose foods with 0% trans fat. Read the ingredient list. Avoid foods with partially hydrogenated oil in the ingredient list. This means there is trans fat in the product.  Where can I learn more?   American Heart Association   http://www.heart.org/HEARTORG/Conditions/Cholesterol/PreventionTreatmentofHighCholesterol/Know-Your-Fats_[facility]_305628_Article.jsp   Last Reviewed Date   2021-10-05  Consumer Information Use and Disclaimer   This information is not specific medical advice and does not replace information you receive from your health care provider. This is only a brief summary of general information. It does NOT include all information about conditions, illnesses, injuries, tests, procedures, treatments, therapies, discharge instructions or life-style choices that may apply to you. You must talk with your health care provider for complete information about your health and treatment options. This information should not be used to decide whether or not to accept your  health care providers advice, instructions or recommendations. Only your health care provider has the knowledge and training to provide advice that is right for you.   Copyright   Copyright © 2021 [employer] and its affiliates and/or licensors. All rights reserved.

## 2022-09-06 NOTE — PROGRESS NOTES
Subjective:       Patient ID: Isabella Santana is a 56 y.o. female.    Chief Complaint: Follow-up    57 y/o female presents for F/U.    Reports she fractured her left shoulder after falling and tripping over her dog on 08/18/22. She went to urgent care that night and had an XRAY done and was found to have a fracture. She was referred to Dr. Stanton for further evaluation. She had a CT scan done and told she did not need surgery right now and just to keep her sling on so it can heal itself which she has on today. She is currently off from work due to this injury. Her next appt is scheduled tomorrow with Dr. Stanton.     Anxiety - chronic, usually controlled on clonazepam as needed. Only takes when she needs it. Denies se/ar to medication. Denies being depressed. Denies SI/HI.    ADHD - takes Vyvanse 50 mg daily and it is working well. No s/e of CP, palpitations, syncope, Denies se/at to medication.  checked and is appropriate.    Had a total hysterectomy in 2012 and she takes Estradiol daily which works well.     UTD with COVID and FLU vaccines.                Past Medical History:   Diagnosis Date    ADHD (attention deficit hyperactivity disorder)     Anxiety     Hormone deficiency     Insomnia        Past Surgical History:   Procedure Laterality Date    BREAST BIOPSY Left     HYSTERECTOMY      WISDOM TOOTH EXTRACTION         Family History   Problem Relation Age of Onset    Diabetes Mother     Cancer Father     Heart disease Father     Cancer Sister        Social History     Tobacco Use    Smoking status: Former     Types: Vaping w/o nicotine    Smokeless tobacco: Never   Substance Use Topics    Alcohol use: Yes    Drug use: Never       Patient Active Problem List   Diagnosis    Attention deficit hyperactivity disorder    Breast lump    Primary insomnia    Menopausal disorder    Generalized anxiety disorder       Immunization History   Administered Date(s) Administered    COVID-19, MRNA, LN-S, PF (Pfizer) (Purple  "Cap) 07/27/2021, 08/17/2021    Influenza - Quadrivalent - PF *Preferred* (6 months and older) 10/05/2020, 09/23/2021           Review of Systems   Constitutional:  Negative for activity change, appetite change, chills, diaphoresis, fatigue and fever.   HENT:  Positive for sinus pain. Negative for congestion, ear pain, tinnitus and trouble swallowing.    Eyes:  Negative for visual disturbance.   Respiratory:  Negative for cough, chest tightness, shortness of breath and wheezing.    Cardiovascular:  Negative for chest pain, palpitations and leg swelling.   Gastrointestinal:  Negative for abdominal distention and abdominal pain.   Genitourinary:  Negative for decreased urine volume, dysuria, frequency, hematuria and urgency.   Musculoskeletal:  Positive for arthralgias (left shoulder). Negative for gait problem, neck pain and neck stiffness.   Skin:  Negative for color change and rash.   Neurological:  Negative for dizziness, syncope, weakness, light-headedness, numbness and headaches.   Psychiatric/Behavioral:  Negative for behavioral problems, confusion, dysphoric mood and suicidal ideas. The patient is not nervous/anxious.    Objective:     Vitals:    09/06/22 1618   BP: 113/79   BP Location: Right arm   Patient Position: Sitting   BP Method: Large (Automatic)   Pulse: 93   Resp: 18   SpO2: 97%   Weight: 55.8 kg (123 lb)   Height: 5' 1" (1.549 m)       Physical Exam  Vitals and nursing note reviewed.   Constitutional:       Appearance: Normal appearance.   HENT:      Head: Normocephalic and atraumatic.      Mouth/Throat:      Mouth: Mucous membranes are moist.      Pharynx: Oropharynx is clear.   Eyes:      General:         Right eye: No discharge.         Left eye: No discharge.      Pupils: Pupils are equal, round, and reactive to light.   Cardiovascular:      Rate and Rhythm: Normal rate and regular rhythm.      Pulses: Normal pulses.      Heart sounds: Normal heart sounds.   Pulmonary:      Effort: Pulmonary " effort is normal.      Breath sounds: Normal breath sounds. No stridor. No wheezing or rales.   Abdominal:      General: There is no distension.      Tenderness: There is no abdominal tenderness. There is no guarding.   Musculoskeletal:         General: Tenderness (left shoulder) and signs of injury present. Normal range of motion.      Left shoulder: Tenderness present.      Cervical back: Normal range of motion and neck supple.      Right lower leg: No edema.      Left lower leg: No edema.   Lymphadenopathy:      Cervical: No cervical adenopathy.   Skin:     General: Skin is warm and dry.      Findings: No rash.   Neurological:      General: No focal deficit present.      Mental Status: She is alert and oriented to person, place, and time.   Psychiatric:         Mood and Affect: Mood and affect normal.         Behavior: Behavior normal. Behavior is cooperative.         Thought Content: Thought content normal. Thought content does not include homicidal or suicidal ideation. Thought content does not include homicidal or suicidal plan.       No visits with results within 6 Month(s) from this visit.   Latest known visit with results is:   Office Visit on 03/04/2022   Component Date Value Ref Range Status    WBC 03/04/2022 4.83  4.3 - 10.8 X 10 3/ul Final    RBC 03/04/2022 4.98  4.2 - 5.4 X 10 6/ul Final    RDW-SD 03/04/2022 42.9  37 - 54 fl Final    Hemoglobin 03/04/2022 13.5  12 - 16 g/dL Final    Hematocrit 03/04/2022 42.1  37 - 47 % Final    MCV 03/04/2022 84.5  82 - 100 fl Final    MCH 03/04/2022 27.1  27 - 32 pg Final    MCHC 03/04/2022 32.1  32 - 36 g/dL Final    Platelets 03/04/2022 274  135 - 400 X 10 3/ul Final    Neutrophils 03/04/2022 71.3 (H)  34 - 71.1 % Final    Lymphocytes 03/04/2022 21.5  19.3 - 53.1 % Final    Monocytes 03/04/2022 4.3 (L)  4.7 - 12.5 % Final    Eosinophils 03/04/2022 2.1  0.7 - 7.0 % Final    Basophils 03/04/2022 0.6  0.2 - 1.2 % Final    Neutrophils Absolute 03/04/2022 3.44  2.15 -  7.56 X 10 3/ul Final    Lymphocytes Absolute 03/04/2022 1.04  0.86 - 4.75 X 10 3/ul Final    Monocytes Absolute 03/04/2022 0.21 (L)  0.22 - 1.08 X 10 3/ul Final    Eosinophils Absolute 03/04/2022 0.10  0.04 - 0.54 X 10 3/ul Final    Basophils Absolute 03/04/2022 0.03  0.00 - 0.22 X 10 3/ul Final    Immature Granulocytes Absolute 03/04/2022 0.01  0 - 0.04 X 10 3/ul Final    Immature Granulocytes 03/04/2022 0.2  0 - 0.5 % Final    nRBC# 03/04/2022 0.0  0 - 0.2 /100 WBC Final    nRBC Count Absolute 03/04/2022 0.000  0 - 0.012 x 10 3/ul Final    Glucose 03/04/2022 93  74 - 106 mg/dL Final    BUN 03/04/2022 7.7  6 - 20 mg/dL Final    Creatinine 03/04/2022 0.81  0.50 - 0.90 mg/dL Final    AST 03/04/2022 17  0 - 32 U/L Final    ALT (SGPT) 03/04/2022 18  0 - 33 U/L Final    Alkaline Phosphatase 03/04/2022 62  35 - 105 U/L Final    Calcium 03/04/2022 10.0  8.6 - 10.2 mg/dL Final    Protein, Total 03/04/2022 7.1  6.4 - 8.3 g/dL Final    Albumin 03/04/2022 5.1  3.5 - 5.2 g/dL Final    BILIRUBIN, TOTAL 03/04/2022 0.37  0.00 - 1.20 mg/dL Final    Sodium 03/04/2022 140  136 - 145 mmol/L Final    Potassium 03/04/2022 4.4  3.5 - 5.1 mmol/L Final    Chloride 03/04/2022 101  98 - 107 mmol/L Final    CO2 03/04/2022 28  22 - 29 mmol/L Final    Globulin 03/04/2022 2.0  1.5 - 4.5 g/dL Final    Albumin/Globulin Ratio 03/04/2022 2.6  1.0 - 2.7 Final    BUN/Creatinine Ratio 03/04/2022 9.5  6 - 20 Final    GFR ESTIMATION 03/04/2022 72.62  >60.00 Final    Anion Gap 03/04/2022 11.0  8.0 - 17.0 mmol/L Final    TSH w/reflex to FT4 03/04/2022 1.68  0.27 - 4.20 uIU/mL Final    Cholesterol 03/04/2022 238 (H)  100 - 200 mg/dL Final    Triglycerides 03/04/2022 115  0 - 150 mg/dL Final    HDL 03/04/2022 132  >60 mg/dL Final    LDL Cholesterol 03/04/2022 83.0  0 - 100 mg/dL Final    LDL/HDL Ratio 03/04/2022 0.6 (L)  1 - 3 Final    VITAMIN D (25OHD) 03/04/2022 32.4  >30 ng/mL Final         Assessment:      1. Attention deficit hyperactivity disorder  (ADHD), combined type Well controlled   2. Generalized anxiety disorder Stable   3. Menopausal disorder    4. Primary insomnia    5. Other hyperlipidemia          Plan:     Attention deficit hyperactivity disorder (ADHD), combined type  Comments:  Vyvanse refilled, F/U in 3 months.  Orders:  -     lisdexamfetamine (VYVANSE) 50 MG capsule; Take 1 capsule (50 mg total) by mouth every morning.  Dispense: 30 capsule; Refill: 0    Generalized anxiety disorder  Comments:  Clonazepam refilled as needed  Orders:  -     clonazePAM (KLONOPIN) 1 MG tablet; Take 1 tablet (1 mg total) by mouth 2 (two) times daily as needed for Anxiety.  Dispense: 60 tablet; Refill: 2    Menopausal disorder  Comments:  Continue estradiol daily    Primary insomnia  Comments:  Continue clonazepam    Other hyperlipidemia  Comments:  Eat low cholesterol diet and exercise regularly       Current Outpatient Medications   Medication Sig Dispense Refill    estradioL (ESTRACE) 1 MG tablet Take 1 tablet (1 mg total) by mouth once daily. 90 tablet 1    clonazePAM (KLONOPIN) 1 MG tablet Take 1 tablet (1 mg total) by mouth 2 (two) times daily as needed for Anxiety. 60 tablet 2    [START ON 9/13/2022] lisdexamfetamine (VYVANSE) 50 MG capsule Take 1 capsule (50 mg total) by mouth every morning. 30 capsule 0     No current facility-administered medications for this visit.       Medications Discontinued During This Encounter   Medication Reason    amitriptyline (ELAVIL) 25 MG tablet Alternate therapy    clonazePAM (KLONOPIN) 1 MG tablet Reorder    lisdexamfetamine (VYVANSE) 50 MG capsule Reorder       Health Maintenance   Topic Date Due    Mammogram  09/16/2020    Lipid Panel  03/04/2027    Hepatitis C Screening  Completed    TETANUS VACCINE  Discontinued       Patient Instructions   RTC in 3 months for F/U or sooner if needed.    Keep appts with specialists as scheduled.    Instructed patient to report to nearest ER or call 911 if begins to have difficulty  breathing, turning blue, chest pain, B/P < 80/60 or >170/100, palpitations, syncope, extreme weakness, or severe H/A. Patient verbalized understanding.      Patient Education       Anxiety Discharge Instructions, Adult   About this topic   Anxiety can cause you to feel very worried. It can also cause physical symptoms like chest pain, stomach aches, or trouble sleeping. While mild anxiety is a normal response to stress, it can cause you problems in your everyday life. You may need follow-up care to help manage your anxiety. Anxiety happens in many forms, like:  Being scared all the time that something bad is going to happen. This is generalized anxiety.  Strong bursts of fear where your body has signs that may feel like a heart attack. This is called a panic attack.  Upsetting thoughts that happen often. There is a need to repeat doing certain things to help get rid of the anxiety caused by these thoughts. The thoughts or actions may be about checking on things, touching things, or worry about germs. This is an obsessive-compulsive disorder.  Strong fear of an object, place, or condition. This is a phobia.  Fear that others think bad things about you or being put down by other people. This is social anxiety.  Nightmares, flashbacks, staying away from people, or having panic attacks when reminded of a shocking or hurtful time or place from the past. This is post-traumatic stress.  Anxiety disorder may be treated in many ways. Some kinds of treatment have you talk about your beliefs, fears, and worries. You may learn how certain thoughts or feelings can raise anxiety. You may also learn what steps to take to lower anxiety. Other kinds of treatment may have you look back on a hurtful event, sad memory, or something you are afraid of. The doctor will help you deal with the feelings that you may have. You may learn ways to cope with unwanted events or thoughts by looking at your fears in a way that feels safe.  What care  is needed at home?   Ask your doctor what you need to do when you go home. Make sure you ask questions if you do not understand what the doctor says.  Set a time to talk with a counselor about your worries and feelings. This can help you with your anxiety.  Take care to follow all instructions when you take your medicines.  Limit alcohol and caffeine.  Learn ways to manage stress. Relaxation methods like reflection, deep breathing, and muscle relaxation may be helpful. Things like yoga, exercise, and mike chi are also good.  Talk about your feelings with family members and friends you trust. Talk to someone who can help you see how your thoughts at certain times may raise your anxiety.     What follow-up care is needed?   Your doctor may ask you to make visits to the office to check on your progress. Be sure to keep these visits.  What drugs may be needed?   The doctor may order drugs to help the physical signs of anxiety. Make sure that you take the drugs as taught to you by the doctor. Talk with your doctor about any side effects and ask how long you should take the drug.  Will physical activity be limited?   You may take part in physical activities. Some people are limited because of their anxiety or fear. Talk with your doctor about the right amount of activity for you.  What changes to diet are needed?   Eat a variety of healthy foods and limit drinks with caffeine. You should avoid alcohol, energy drinks, and over-the-counter stimulants.  What problems could happen?   If your anxiety is not treated, it can result in:  Staying away from work or social events  Not being able to do everyday tasks  Keeping away from family and friends  What can be done to prevent this health problem?   Learn what events, people, or things upset you. Limit your contact with them.  Talk about your feelings. Talk to someone who can help you see how your thoughts at certain times may raise your anxiety.  Seek support from your friends  and family. Find someone who calms you down. Ask if you can call them when you are getting anxious.  When do I need to call the doctor?   You feel you may harm yourself or someone else.  You can also call a mental health hotline for help.  You have any physical symptoms, such as chest pain, trouble breathing, or severe belly pain, that could be a sign of a serious problem.  If you are short of breath.  If you do not feel like you can be alone.  Teach Back: Helping You Understand   The Teach Back Method helps you understand the information we are giving you. After you talk with the staff, tell them in your own words what you learned. This helps to make sure the staff has described each thing clearly. It also helps to explain things that may have been confusing. Before going home, make sure you can do these:  I can tell you about my condition and the drugs I need to take.  I can tell you what may help lower my anxiety.  I can tell you what I will do if it is hard to breathe or I have chest pain.  I can tell you what I will do if I do not feel safe or cannot be alone.  Where can I learn more?   [employer]  https://www.gricelda.org/Learn-More/Mental-Health-Conditions/Anxiety-Disorders   National Health Service  https://www.nhs.uk/conditions/generalised-anxiety-disorder/symptoms/   National [place] of Health ? [facility]  https://www.sudha.nih.gov/health/relieving-stress-anxiety-zcwquryvm-twbkyasqow-ubukgrfhse   National [place] of Mental Health  http://www.nimh.nih.gov/health/publications/anxiety-disorders/complete-index.shtml   Last Reviewed Date   2021-06-08  Consumer Information Use and Disclaimer   This information is not specific medical advice and does not replace information you receive from your health care provider. This is only a brief summary of general information. It does NOT include all information about conditions, illnesses, injuries, tests, procedures, treatments, therapies, discharge instructions or  "life-style choices that may apply to you. You must talk with your health care provider for complete information about your health and treatment options. This information should not be used to decide whether or not to accept your health care providers advice, instructions or recommendations. Only your health care provider has the knowledge and training to provide advice that is right for you.  Copyright   Copyright © 2021 Up[employer]Date, Inc. and its affiliates and/or licensors. All rights reserved.    Patient Education       Low Cholesterol, Saturated Fat, and Trans Fat Diet   About this topic   Cholesterol, saturated fat, and trans fat are in many foods. These may raise your blood cholesterol levels. If your cholesterol is too high, this can cause health problems in your heart, liver, kidneys, and even your eyes. The key to lowering your risk of heart problems is to lower your bad fat intake.  Saturated fats and trans fats are the bad fats. These fats clog your arteries and raise your bad cholesterol. Saturated fats and trans fats are solid fats at room temperature. Saturated fats are animal fats. Trans fats are manmade fats. They add flavor to a lot of packaged foods. Staying away from saturated and trans fats will help your heart.  When you do eat foods with fat, make sure they have the good fats. Monounsaturated and polyunsaturated fats are good fats. These fats help raise your good cholesterol and protect your heart.  General   How to Lower Fat and Cholesterol in Your Diet   Read the labels of the foods you buy from the market to find out how much fat is present. Under 5% of total fat on a label means it is "low fat". Over 20% of total fat on a label means it is high fat.  Eat high fiber foods, like soluble fiber. This type of fiber helps lower cholesterol in the body. Choose oatmeal, fruits (like apples), beans, and nuts to get the most soluble fiber.  Eat foods high in omega-3 fatty acids like sha seeds, " walnuts, salmon, tuna, trout, herring, flaxseed, and soybeans. These foods help keep the heart healthy.  Limit your bad fat and oil intake.  Stay away from butter, stick margarine, shortening, lard, and palm and coconut oil. Pick plant-based spreads instead.  Limit mayonnaise, salad dressings, gravies, and sauces, unless it is made from low-fat ingredients.  Limit chocolate.  Do not eat high-fat processed foods like hot dogs, luna, sausage, ham and other luncheon meats high in fat, and some frozen foods. Pick fish, chicken, turkey, and lean meats instead.  Eat more dried beans, lentils, and tofu to get your protein.  Do not eat organ meats, like liver.  Choose nonfat or low-fat milk, yogurt, and cheese.  Use light or fat-free cream cheese and sour cream.  Eat lots of fruits and vegetables.  Pick whole grain breads, cereals, pastas, and rice.  Do not eat snacks that are high in fats like granola, cookies, pies, pastries, doughnuts, and croissants.  Stay away from deep fried foods.  Help When Cooking   Remove the fat portion of meats and the skin from poultry before cooking.  Bake, broil, grill, poach, or roast poultry, fish, and lean meats.  Drain and throw away the fat that drains out of meat as you cook it.  Try to add little or no fat to foods.  Use olive or canola oil for cooking or baking.  Steam your vegetables.  Use herbs or no-oil marinades to flavor foods.         Who should use this diet?   This diet is for people who are at high risk of getting health problems like heart disease, high blood pressure, diabetes, and others. This diet is also good for all people to follow to keep your heart healthy.  What foods are good to eat?   Foods with good fats are:  Canola, peanut, and olive oil  Safflower, soybean, and corn oil  Walnuts, almonds, cashews, and peanuts  Pumpkin and sunflower seeds  [place] and tuna  Tofu  Soymilk  Avocado  What foods should be limited or avoided?   Stay away from these types of foods  that have saturated fats:  Whole fat dairy products like cheese, ice cream, whole milk, and cream  Palm and coconut oils  High-fat meats like beef, lamb, poultry with the skin, luna, and sausage  Butter and lard  Stay away from these types of foods that may have trans fat:  Cookies, cakes, candy, doughnuts, baked goods, muffins, pizza dough, and pie crusts that are packaged  Fried foods  Frozen dinners  Chips and crackers  Microwave popcorn  Stick margarine and vegetable shortenings  Helpful tips   To help stay away from saturated fat:  Pick lean cuts of meat  Take the skin off chicken and turkey or pick skinless  Pick low-fat cheese, milk, and ice cream  Use liquid oils when cooking and baking, such as olive oil and canola oil  To help stay away from trans fat:  Look at your labels. Choose foods with 0% trans fat. Read the ingredient list. Avoid foods with partially hydrogenated oil in the ingredient list. This means there is trans fat in the product.  Where can I learn more?   American Heart Association   http://www.heart.org/HEARTORG/Conditions/Cholesterol/PreventionTreatmentofHighCholesterol/Know-Your-Fats_[facility]_305628_Article.jsp   Last Reviewed Date   2021-10-05  Consumer Information Use and Disclaimer   This information is not specific medical advice and does not replace information you receive from your health care provider. This is only a brief summary of general information. It does NOT include all information about conditions, illnesses, injuries, tests, procedures, treatments, therapies, discharge instructions or life-style choices that may apply to you. You must talk with your health care provider for complete information about your health and treatment options. This information should not be used to decide whether or not to accept your health care providers advice, instructions or recommendations. Only your health care provider has the knowledge and training to provide advice that is right for you.    Copyright   Copyright © 2021 UpToDate, Inc. and its affiliates and/or licensors. All rights reserved.          Risks, benefits, and alternatives discussed with patient, Patient verbalized understanding of discussed plan of care. Asked patient if any further questions, answered no.    Future Appointments   Date Time Provider Department Center   12/12/2022  3:40 PM Carline Camacho NP [facility]              Carline Camacho NP

## 2022-09-26 DIAGNOSIS — F90.2 ATTENTION DEFICIT HYPERACTIVITY DISORDER (ADHD), COMBINED TYPE: ICD-10-CM

## 2022-09-26 RX ORDER — LISDEXAMFETAMINE DIMESYLATE 50 MG/1
50 CAPSULE ORAL EVERY MORNING
Qty: 30 CAPSULE | Refills: 0 | Status: SHIPPED | OUTPATIENT
Start: 2022-09-26 | End: 2022-11-17 | Stop reason: SDUPTHER

## 2022-10-13 DIAGNOSIS — N95.9 MENOPAUSAL DISORDER: ICD-10-CM

## 2022-10-13 RX ORDER — ESTRADIOL 1 MG/1
TABLET ORAL
Qty: 90 TABLET | Refills: 1 | Status: SHIPPED | OUTPATIENT
Start: 2022-10-13 | End: 2023-03-22 | Stop reason: SDUPTHER

## 2022-11-16 ENCOUNTER — PATIENT MESSAGE (OUTPATIENT)
Dept: ADMINISTRATIVE | Facility: HOSPITAL | Age: 57
End: 2022-11-16
Payer: COMMERCIAL

## 2022-11-17 DIAGNOSIS — F90.2 ATTENTION DEFICIT HYPERACTIVITY DISORDER (ADHD), COMBINED TYPE: ICD-10-CM

## 2022-11-18 RX ORDER — LISDEXAMFETAMINE DIMESYLATE 50 MG/1
50 CAPSULE ORAL EVERY MORNING
Qty: 30 CAPSULE | Refills: 0 | Status: SHIPPED | OUTPATIENT
Start: 2022-11-18 | End: 2022-12-12 | Stop reason: SDUPTHER

## 2022-12-12 ENCOUNTER — OFFICE VISIT (OUTPATIENT)
Dept: PRIMARY CARE CLINIC | Facility: CLINIC | Age: 57
End: 2022-12-12
Payer: COMMERCIAL

## 2022-12-12 VITALS
WEIGHT: 118 LBS | SYSTOLIC BLOOD PRESSURE: 120 MMHG | OXYGEN SATURATION: 100 % | BODY MASS INDEX: 22.28 KG/M2 | RESPIRATION RATE: 18 BRPM | DIASTOLIC BLOOD PRESSURE: 80 MMHG | HEART RATE: 83 BPM | HEIGHT: 61 IN

## 2022-12-12 DIAGNOSIS — F41.1 GENERALIZED ANXIETY DISORDER: ICD-10-CM

## 2022-12-12 DIAGNOSIS — M19.019 ARTHRITIS OF SHOULDER: ICD-10-CM

## 2022-12-12 DIAGNOSIS — S43.432A ANTERIOR TO POSTERIOR TEAR OF SUPERIOR GLENOID LABRUM OF LEFT SHOULDER: ICD-10-CM

## 2022-12-12 DIAGNOSIS — M25.512 CHRONIC LEFT SHOULDER PAIN: Primary | ICD-10-CM

## 2022-12-12 DIAGNOSIS — G89.29 CHRONIC LEFT SHOULDER PAIN: Primary | ICD-10-CM

## 2022-12-12 DIAGNOSIS — F90.2 ATTENTION DEFICIT HYPERACTIVITY DISORDER (ADHD), COMBINED TYPE: ICD-10-CM

## 2022-12-12 PROCEDURE — 3074F SYST BP LT 130 MM HG: CPT | Mod: CPTII,S$GLB,, | Performed by: NURSE PRACTITIONER

## 2022-12-12 PROCEDURE — 3008F BODY MASS INDEX DOCD: CPT | Mod: CPTII,S$GLB,, | Performed by: NURSE PRACTITIONER

## 2022-12-12 PROCEDURE — 1159F PR MEDICATION LIST DOCUMENTED IN MEDICAL RECORD: ICD-10-PCS | Mod: CPTII,S$GLB,, | Performed by: NURSE PRACTITIONER

## 2022-12-12 PROCEDURE — 1159F MED LIST DOCD IN RCRD: CPT | Mod: CPTII,S$GLB,, | Performed by: NURSE PRACTITIONER

## 2022-12-12 PROCEDURE — 1160F PR REVIEW ALL MEDS BY PRESCRIBER/CLIN PHARMACIST DOCUMENTED: ICD-10-PCS | Mod: CPTII,S$GLB,, | Performed by: NURSE PRACTITIONER

## 2022-12-12 PROCEDURE — 1160F RVW MEDS BY RX/DR IN RCRD: CPT | Mod: CPTII,S$GLB,, | Performed by: NURSE PRACTITIONER

## 2022-12-12 PROCEDURE — 99214 OFFICE O/P EST MOD 30 MIN: CPT | Mod: S$GLB,,, | Performed by: NURSE PRACTITIONER

## 2022-12-12 PROCEDURE — 3008F PR BODY MASS INDEX (BMI) DOCUMENTED: ICD-10-PCS | Mod: CPTII,S$GLB,, | Performed by: NURSE PRACTITIONER

## 2022-12-12 PROCEDURE — 3079F PR MOST RECENT DIASTOLIC BLOOD PRESSURE 80-89 MM HG: ICD-10-PCS | Mod: CPTII,S$GLB,, | Performed by: NURSE PRACTITIONER

## 2022-12-12 PROCEDURE — 99214 PR OFFICE/OUTPT VISIT, EST, LEVL IV, 30-39 MIN: ICD-10-PCS | Mod: S$GLB,,, | Performed by: NURSE PRACTITIONER

## 2022-12-12 PROCEDURE — 3074F PR MOST RECENT SYSTOLIC BLOOD PRESSURE < 130 MM HG: ICD-10-PCS | Mod: CPTII,S$GLB,, | Performed by: NURSE PRACTITIONER

## 2022-12-12 PROCEDURE — 3079F DIAST BP 80-89 MM HG: CPT | Mod: CPTII,S$GLB,, | Performed by: NURSE PRACTITIONER

## 2022-12-12 RX ORDER — CLONAZEPAM 1 MG/1
1 TABLET ORAL 2 TIMES DAILY PRN
Qty: 60 TABLET | Refills: 2 | Status: SHIPPED | OUTPATIENT
Start: 2022-12-12 | End: 2023-07-24 | Stop reason: SDUPTHER

## 2022-12-12 RX ORDER — LISDEXAMFETAMINE DIMESYLATE 50 MG/1
50 CAPSULE ORAL EVERY MORNING
Qty: 30 CAPSULE | Refills: 0 | Status: SHIPPED | OUTPATIENT
Start: 2022-12-19 | End: 2023-01-17 | Stop reason: SDUPTHER

## 2022-12-12 NOTE — PATIENT INSTRUCTIONS
RTC in 3 months for F/U or sooner if needed.    Keep appts with specialists as scheduled.    Instructed patient to report to nearest ER or call 911 if begins to have difficulty breathing, turning blue, chest pain, B/P < 80/60 or >170/100, palpitations, syncope, extreme weakness, or severe H/A. Patient verbalized understanding.      Patient Education       Anxiety Discharge Instructions, Adult   About this topic   Anxiety can cause you to feel very worried. It can also cause physical symptoms like chest pain, stomach aches, or trouble sleeping. While mild anxiety is a normal response to stress, it can cause you problems in your everyday life. You may need follow-up care to help manage your anxiety. Anxiety happens in many forms, like:  Being scared all the time that something bad is going to happen. This is generalized anxiety.  Strong bursts of fear where your body has signs that may feel like a heart attack. This is called a panic attack.  Upsetting thoughts that happen often. There is a need to repeat doing certain things to help get rid of the anxiety caused by these thoughts. The thoughts or actions may be about checking on things, touching things, or worry about germs. This is an obsessive-compulsive disorder.  Strong fear of an object, place, or condition. This is a phobia.  Fear that others think bad things about you or being put down by other people. This is social anxiety.  Nightmares, flashbacks, staying away from people, or having panic attacks when reminded of a shocking or hurtful time or place from the past. This is post-traumatic stress.  Anxiety disorder may be treated in many ways. Some kinds of treatment have you talk about your beliefs, fears, and worries. You may learn how certain thoughts or feelings can raise anxiety. You may also learn what steps to take to lower anxiety. Other kinds of treatment may have you look back on a hurtful event, sad memory, or something you are afraid of. The doctor  will help you deal with the feelings that you may have. You may learn ways to cope with unwanted events or thoughts by looking at your fears in a way that feels safe.  What care is needed at home?   Ask your doctor what you need to do when you go home. Make sure you ask questions if you do not understand what the doctor says.  Set a time to talk with a counselor about your worries and feelings. This can help you with your anxiety.  Take care to follow all instructions when you take your medicines.  Limit alcohol and caffeine.  Learn ways to manage stress. Relaxation methods like reflection, deep breathing, and muscle relaxation may be helpful. Things like yoga, exercise, and mike chi are also good.  Talk about your feelings with family members and friends you trust. Talk to someone who can help you see how your thoughts at certain times may raise your anxiety.     What follow-up care is needed?   Your doctor may ask you to make visits to the office to check on your progress. Be sure to keep these visits.  What drugs may be needed?   The doctor may order drugs to help the physical signs of anxiety. Make sure that you take the drugs as taught to you by the doctor. Talk with your doctor about any side effects and ask how long you should take the drug.  Will physical activity be limited?   You may take part in physical activities. Some people are limited because of their anxiety or fear. Talk with your doctor about the right amount of activity for you.  What changes to diet are needed?   Eat a variety of healthy foods and limit drinks with caffeine. You should avoid alcohol, energy drinks, and over-the-counter stimulants.  What problems could happen?   If your anxiety is not treated, it can result in:  Staying away from work or social events  Not being able to do everyday tasks  Keeping away from family and friends  What can be done to prevent this health problem?   Learn what events, people, or things upset you. Limit  your contact with them.  Talk about your feelings. Talk to someone who can help you see how your thoughts at certain times may raise your anxiety.  Seek support from your friends and family. Find someone who calms you down. Ask if you can call them when you are getting anxious.  When do I need to call the doctor?   You feel you may harm yourself or someone else.  You can also call a mental health hotline for help.  You have any physical symptoms, such as chest pain, trouble breathing, or severe belly pain, that could be a sign of a serious problem.  If you are short of breath.  If you do not feel like you can be alone.  Teach Back: Helping You Understand   The Teach Back Method helps you understand the information we are giving you. After you talk with the staff, tell them in your own words what you learned. This helps to make sure the staff has described each thing clearly. It also helps to explain things that may have been confusing. Before going home, make sure you can do these:  I can tell you about my condition and the drugs I need to take.  I can tell you what may help lower my anxiety.  I can tell you what I will do if it is hard to breathe or I have chest pain.  I can tell you what I will do if I do not feel safe or cannot be alone.  Where can I learn more?   [facility]  https://www.gricelda.org/Learn-More/Mental-Health-Conditions/Anxiety-Disorders   [facility]  https://www.nhs.uk/conditions/generalised-anxiety-disorder/symptoms/   [employer] [place] [facility] ? [facility]  https://www.sudha.nih.gov/health/relieving-stress-anxiety-puzcvxpfm-jfzeagoaqz-xzkkpnvkme   [employer] [place] of [facility]  http://www.nimh.nih.gov/health/publications/anxiety-disorders/complete-index.shtml   Last Reviewed Date   2021-06-08  Consumer Information Use and Disclaimer   This information is not specific medical advice and does not replace information you receive from your health care provider. This is only a brief  summary of general information. It does NOT include all information about conditions, illnesses, injuries, tests, procedures, treatments, therapies, discharge instructions or life-style choices that may apply to you. You must talk with your health care provider for complete information about your health and treatment options. This information should not be used to decide whether or not to accept your health care providers advice, instructions or recommendations. Only your health care provider has the knowledge and training to provide advice that is right for you.  Copyright   Copyright © 2021 [employer] and its affiliates and/or licensors. All rights reserved.  Patient Education       Attention Deficit Hyperactivity Disorder (ADHD) Discharge Instructions   About this topic   Attention deficit hyperactivity disorder is also called ADHD or ADD. Most often, this starts at a young age. This disorder makes it hard to focus or sit still. People with ADHD may find it hard to make good decisions. Children with ADHD may have trouble in school and at home. Adults may have problems at work. People with ADHD may also have a problem getting along well with others. While there is no cure for ADHD, you and your doctor can work on a plan that is best for you to treat the signs of ADHD.  What care is needed at home?   Ask your doctor what you need to do when you go home. Make sure you ask questions if you do not understand what the doctor says. This way you will know what you need to do.  Try to get enough sleep at night. Most often adults need 7 to 8 hours each night and children need 8 to 10 hours each night. Rest during the day if you are tired.  Eat and sleep at the same times every day.  Have a plan for where to keep things in your home. Use checklists, things to help you remember, and alarms. A list of things you may need to find in a hurry can be helpful. These can help you put things in the right place and manage your  time.  Work on getting better at reading and taking notes.  Have a space that is just for work or homework so you will be able to pay attention. This may be an office or a desk facing a wall. Try using headphones so you cannot hear the other noises.  Do one thing at a time. Keep a list of the next things you were planning to do or say.  Break large jobs or things you have to do into smaller jobs. This will make them easier to finish. Reward yourself along the way.  Have rules that are clear and easy to follow.   Look at where you are the strongest. Give rewards for good behavior, finished schoolwork, or for doing a good job at work.  Do not do too many things that might cause stress.  What follow-up care is needed?   Your doctor may ask you to make visits to the office to check on your progress. Be sure to keep these visits.  Your doctor may send you to a special mental health doctor. This person will talk with you about the problems you are having. Then, you can work together to find ways to help you manage them.  Your doctor may suggest you try talk therapy to help you live well with your ADHD.  What drugs may be needed?   The doctor may order drugs to:  Help lower your worry  Help you to pay attention  Help you be more calm  Help you be less impulsive  Help keep things in your life balanced  Care for low mood  Will physical activity be limited?   Physical activity will help keep your mind and body in good shape. Talk with your doctor about the right amount of activity for you.  What problems could happen?   Feeling badly about yourself  Raise the chances of you hurting yourself, such as injury in a car accident  Not do well in school and work  Trouble making friends or getting along well with others  Raise your chance to abuse alcohol or drugs  What can be done to prevent this health problem?   The cause of ADHD is not clear, but to lower the chance of your child having ADHD:  Do not drink beer, wine, or mixed  drinks (alcohol) while you are pregnant.  Do not smoke or use illegal drugs while you are pregnant.  Keep your child away from things like harmful toxins and chemicals.  Keep your child from having too much time in front of computers, TV, and video games.  Get plenty of exercise.  Be more aware of thoughts, emotions, and experiences each moment. This is mindfulness.  When do I need to call the doctor?   Drugs are not working  Symptoms are getting worse  Teach Back: Helping You Understand   The Teach Back Method helps you understand the information we are giving you. After you talk with the staff, tell them in your own words what you learned. This helps to make sure the staff has described each thing clearly. It also helps to explain things that may have been confusing. Before going home, make sure you can do these:  I can tell you about my condition.  I can tell you ways to help me pay attention.  I can tell you what I will do if I think my drugs are not working.  Where can I learn more?   HelpGuide[employer]org  http://www.helpguide.org/articles/add-adhd/attention-deficit-disorder-adhd-parenting-tips.htm   [facility]  http://kidshealth.org/parent/medical/learning/adhd.html   National [place] of [facility]  http://www.nimh.nih.gov/health/topics/attention-deficit-hyperactivity-disorder-adhd/index.shtml   Last Reviewed Date   2020-06-14  Consumer Information Use and Disclaimer   This information is not specific medical advice and does not replace information you receive from your health care provider. This is only a brief summary of general information. It does NOT include all information about conditions, illnesses, injuries, tests, procedures, treatments, therapies, discharge instructions or life-style choices that may apply to you. You must talk with your health care provider for complete information about your health and treatment options. This information should not be used to decide whether or not to accept your  health care providers advice, instructions or recommendations. Only your health care provider has the knowledge and training to provide advice that is right for you.  Copyright   Copyright © 2021 [employer] and its affiliates and/or licensors. All rights reserved.

## 2022-12-12 NOTE — PROGRESS NOTES
Subjective:       Patient ID: Isabella Santana is a 57 y.o. female.    Chief Complaint: Follow-up    55 y/o female presents for F/U.    Still with C/O left shoulder pain after injury. Had a recent MRI on 12/07/22 and it shows multiple tears and arthritis. She was seen last on Friday by Dr. Stanton's NP and was given a steroid injection. Reports she fractured her left shoulder after falling and tripping over her dog on 08/18/22. She went to urgent care that night and had an XRAY done and was found to have a fracture. She was referred to Dr. Stanton for further evaluation. She had a CT scan done and told she did not need surgery right now and just to keep her sling on so it can heal itself which she has on today. She is currently off from work due to this injury. She would like a referral to see a different Orthopedic.    Anxiety - chronic, usually controlled on clonazepam as needed. Only takes when she needs it. Denies se/ar to medication. Denies being depressed. Denies SI/HI.    ADHD - takes Vyvanse 50 mg daily and it is working well. No s/e of CP, palpitations, syncope, Denies se/at to medication.  checked and is appropriate.    Had a total hysterectomy in 2012 and she takes Estradiol daily which works well.     UTD with COVID and FLU vaccines.                Past Medical History:   Diagnosis Date    ADHD (attention deficit hyperactivity disorder)     Anxiety     Hormone deficiency     Insomnia        Past Surgical History:   Procedure Laterality Date    BREAST BIOPSY Left     HYSTERECTOMY      WISDOM TOOTH EXTRACTION         Family History   Problem Relation Age of Onset    Diabetes Mother     Cancer Father     Heart disease Father     Cancer Sister        Social History     Tobacco Use    Smoking status: Former     Types: Vaping w/o nicotine    Smokeless tobacco: Never   Substance Use Topics    Alcohol use: Yes    Drug use: Never       Patient Active Problem List   Diagnosis    Attention deficit hyperactivity  "disorder    Breast lump    Primary insomnia    Menopausal disorder    Generalized anxiety disorder       Immunization History   Administered Date(s) Administered    COVID-19, MRNA, LN-S, PF (Pfizer) (Purple Cap) 07/27/2021, 08/17/2021    Influenza - Quadrivalent - PF *Preferred* (6 months and older) 10/05/2020, 09/23/2021, 10/24/2022           Review of Systems   Constitutional:  Negative for activity change, appetite change, chills, diaphoresis, fatigue and fever.   HENT:  Negative for congestion, ear pain, sinus pain, tinnitus and trouble swallowing.    Eyes:  Negative for visual disturbance.   Respiratory:  Negative for cough, chest tightness, shortness of breath and wheezing.    Cardiovascular:  Negative for chest pain, palpitations and leg swelling.   Gastrointestinal:  Negative for abdominal distention, abdominal pain, blood in stool, constipation, diarrhea, nausea and vomiting.   Genitourinary:  Negative for dysuria, frequency and hematuria.   Musculoskeletal:  Positive for arthralgias (left shoulder). Negative for back pain, gait problem and neck pain.   Skin:  Negative for color change and rash.   Neurological:  Negative for dizziness, syncope, weakness, light-headedness, numbness and headaches.   Psychiatric/Behavioral:  Negative for behavioral problems, confusion, dysphoric mood and suicidal ideas.    Objective:     Vitals:    12/12/22 1547   BP: 120/80   BP Location: Right arm   Patient Position: Sitting   BP Method: Medium (Automatic)   Pulse: 83   Resp: 18   SpO2: 100%   Weight: 53.5 kg (118 lb)   Height: 5' 1" (1.549 m)       Physical Exam  Vitals and nursing note reviewed.   Constitutional:       General: She is not in acute distress.     Appearance: Normal appearance. She is not ill-appearing or diaphoretic.   HENT:      Head: Normocephalic and atraumatic.      Mouth/Throat:      Mouth: Mucous membranes are moist.      Pharynx: Oropharynx is clear.   Eyes:      Extraocular Movements: Extraocular " movements intact.      Conjunctiva/sclera: Conjunctivae normal.      Pupils: Pupils are equal, round, and reactive to light.   Cardiovascular:      Rate and Rhythm: Normal rate and regular rhythm.      Pulses: Normal pulses.      Heart sounds: Normal heart sounds.   Pulmonary:      Effort: Pulmonary effort is normal.      Breath sounds: Normal breath sounds. No wheezing or rales.   Abdominal:      General: There is no distension.      Tenderness: There is no abdominal tenderness.   Musculoskeletal:         General: Tenderness (left shoulder) present. Normal range of motion.      Cervical back: Normal range of motion and neck supple.      Right lower leg: No edema.      Left lower leg: No edema.   Lymphadenopathy:      Cervical: No cervical adenopathy.   Skin:     General: Skin is warm and dry.      Capillary Refill: Capillary refill takes less than 2 seconds.      Findings: No rash.   Neurological:      General: No focal deficit present.      Mental Status: She is alert and oriented to person, place, and time.   Psychiatric:         Mood and Affect: Affect normal. Mood is anxious.         Behavior: Behavior normal. Behavior is cooperative.         Thought Content: Thought content normal. Thought content does not include homicidal or suicidal ideation. Thought content does not include homicidal or suicidal plan.         Judgment: Judgment normal.       No visits with results within 6 Month(s) from this visit.   Latest known visit with results is:   Office Visit on 03/04/2022   Component Date Value Ref Range Status    WBC 03/04/2022 4.83  4.3 - 10.8 X 10 3/ul Final    RBC 03/04/2022 4.98  4.2 - 5.4 X 10 6/ul Final    RDW-SD 03/04/2022 42.9  37 - 54 fl Final    Hemoglobin 03/04/2022 13.5  12 - 16 g/dL Final    Hematocrit 03/04/2022 42.1  37 - 47 % Final    MCV 03/04/2022 84.5  82 - 100 fl Final    MCH 03/04/2022 27.1  27 - 32 pg Final    MCHC 03/04/2022 32.1  32 - 36 g/dL Final    Platelets 03/04/2022 274  135 - 400 X  10 3/ul Final    Neutrophils 03/04/2022 71.3 (H)  34 - 71.1 % Final    Lymphocytes 03/04/2022 21.5  19.3 - 53.1 % Final    Monocytes 03/04/2022 4.3 (L)  4.7 - 12.5 % Final    Eosinophils 03/04/2022 2.1  0.7 - 7.0 % Final    Basophils 03/04/2022 0.6  0.2 - 1.2 % Final    Neutrophils Absolute 03/04/2022 3.44  2.15 - 7.56 X 10 3/ul Final    Lymphocytes Absolute 03/04/2022 1.04  0.86 - 4.75 X 10 3/ul Final    Monocytes Absolute 03/04/2022 0.21 (L)  0.22 - 1.08 X 10 3/ul Final    Eosinophils Absolute 03/04/2022 0.10  0.04 - 0.54 X 10 3/ul Final    Basophils Absolute 03/04/2022 0.03  0.00 - 0.22 X 10 3/ul Final    Immature Granulocytes Absolute 03/04/2022 0.01  0 - 0.04 X 10 3/ul Final    Immature Granulocytes 03/04/2022 0.2  0 - 0.5 % Final    nRBC# 03/04/2022 0.0  0 - 0.2 /100 WBC Final    nRBC Count Absolute 03/04/2022 0.000  0 - 0.012 x 10 3/ul Final    Glucose 03/04/2022 93  74 - 106 mg/dL Final    BUN 03/04/2022 7.7  6 - 20 mg/dL Final    Creatinine 03/04/2022 0.81  0.50 - 0.90 mg/dL Final    AST 03/04/2022 17  0 - 32 U/L Final    ALT (SGPT) 03/04/2022 18  0 - 33 U/L Final    Alkaline Phosphatase 03/04/2022 62  35 - 105 U/L Final    Calcium 03/04/2022 10.0  8.6 - 10.2 mg/dL Final    Protein, Total 03/04/2022 7.1  6.4 - 8.3 g/dL Final    Albumin 03/04/2022 5.1  3.5 - 5.2 g/dL Final    BILIRUBIN, TOTAL 03/04/2022 0.37  0.00 - 1.20 mg/dL Final    Sodium 03/04/2022 140  136 - 145 mmol/L Final    Potassium 03/04/2022 4.4  3.5 - 5.1 mmol/L Final    Chloride 03/04/2022 101  98 - 107 mmol/L Final    CO2 03/04/2022 28  22 - 29 mmol/L Final    Globulin 03/04/2022 2.0  1.5 - 4.5 g/dL Final    Albumin/Globulin Ratio 03/04/2022 2.6  1.0 - 2.7 Final    BUN/Creatinine Ratio 03/04/2022 9.5  6 - 20 Final    GFR ESTIMATION 03/04/2022 72.62  >60.00 Final    Anion Gap 03/04/2022 11.0  8.0 - 17.0 mmol/L Final    TSH w/reflex to FT4 03/04/2022 1.68  0.27 - 4.20 uIU/mL Final    Cholesterol 03/04/2022 238 (H)  100 - 200 mg/dL Final     Triglycerides 03/04/2022 115  0 - 150 mg/dL Final    HDL 03/04/2022 132  >60 mg/dL Final    LDL Cholesterol 03/04/2022 83.0  0 - 100 mg/dL Final    LDL/HDL Ratio 03/04/2022 0.6 (L)  1 - 3 Final    VITAMIN D (25OHD) 03/04/2022 32.4  >30 ng/mL Final         Assessment:      1. Chronic left shoulder pain    2. Anterior to posterior tear of superior glenoid labrum of left shoulder    3. Arthritis of shoulder    4. Generalized anxiety disorder Stable   5. Attention deficit hyperactivity disorder (ADHD), combined type Well controlled         Plan:     Chronic left shoulder pain  -     Ambulatory referral/consult to Orthopedics; Future; Expected date: 12/19/2022    Anterior to posterior tear of superior glenoid labrum of left shoulder  -     Ambulatory referral/consult to Orthopedics; Future; Expected date: 12/19/2022    Arthritis of shoulder  -     Ambulatory referral/consult to Orthopedics; Future; Expected date: 12/19/2022    Generalized anxiety disorder  Comments:  Clonazepam refilled as needed  Orders:  -     clonazePAM (KLONOPIN) 1 MG tablet; Take 1 tablet (1 mg total) by mouth 2 (two) times daily as needed for Anxiety.  Dispense: 60 tablet; Refill: 2    Attention deficit hyperactivity disorder (ADHD), combined type  Comments:  Vyvanse refilled, F/U in 3 months.  Orders:  -     lisdexamfetamine (VYVANSE) 50 MG capsule; Take 1 capsule (50 mg total) by mouth every morning.  Dispense: 30 capsule; Refill: 0         Current Outpatient Medications   Medication Sig Dispense Refill    estradioL (ESTRACE) 1 MG tablet TAKE 1 TABLET BY MOUTH EVERY DAY 90 tablet 1    clonazePAM (KLONOPIN) 1 MG tablet Take 1 tablet (1 mg total) by mouth 2 (two) times daily as needed for Anxiety. 60 tablet 2    [START ON 12/19/2022] lisdexamfetamine (VYVANSE) 50 MG capsule Take 1 capsule (50 mg total) by mouth every morning. 30 capsule 0     No current facility-administered medications for this visit.       Medications Discontinued During This  Encounter   Medication Reason    clonazePAM (KLONOPIN) 1 MG tablet Reorder    lisdexamfetamine (VYVANSE) 50 MG capsule Reorder       Health Maintenance   Topic Date Due    Mammogram  09/16/2020    Lipid Panel  03/04/2027    Hepatitis C Screening  Completed    TETANUS VACCINE  Discontinued       Patient Instructions   RTC in 3 months for F/U or sooner if needed.    Keep appts with specialists as scheduled.    Instructed patient to report to nearest ER or call 911 if begins to have difficulty breathing, turning blue, chest pain, B/P < 80/60 or >170/100, palpitations, syncope, extreme weakness, or severe H/A. Patient verbalized understanding.      Patient Education       Anxiety Discharge Instructions, Adult   About this topic   Anxiety can cause you to feel very worried. It can also cause physical symptoms like chest pain, stomach aches, or trouble sleeping. While mild anxiety is a normal response to stress, it can cause you problems in your everyday life. You may need follow-up care to help manage your anxiety. Anxiety happens in many forms, like:  Being scared all the time that something bad is going to happen. This is generalized anxiety.  Strong bursts of fear where your body has signs that may feel like a heart attack. This is called a panic attack.  Upsetting thoughts that happen often. There is a need to repeat doing certain things to help get rid of the anxiety caused by these thoughts. The thoughts or actions may be about checking on things, touching things, or worry about germs. This is an obsessive-compulsive disorder.  Strong fear of an object, place, or condition. This is a phobia.  Fear that others think bad things about you or being put down by other people. This is social anxiety.  Nightmares, flashbacks, staying away from people, or having panic attacks when reminded of a shocking or hurtful time or place from the past. This is post-traumatic stress.  Anxiety disorder may be treated in many ways. Some  kinds of treatment have you talk about your beliefs, fears, and worries. You may learn how certain thoughts or feelings can raise anxiety. You may also learn what steps to take to lower anxiety. Other kinds of treatment may have you look back on a hurtful event, sad memory, or something you are afraid of. The doctor will help you deal with the feelings that you may have. You may learn ways to cope with unwanted events or thoughts by looking at your fears in a way that feels safe.  What care is needed at home?   Ask your doctor what you need to do when you go home. Make sure you ask questions if you do not understand what the doctor says.  Set a time to talk with a counselor about your worries and feelings. This can help you with your anxiety.  Take care to follow all instructions when you take your medicines.  Limit alcohol and caffeine.  Learn ways to manage stress. Relaxation methods like reflection, deep breathing, and muscle relaxation may be helpful. Things like yoga, exercise, and mike chi are also good.  Talk about your feelings with family members and friends you trust. Talk to someone who can help you see how your thoughts at certain times may raise your anxiety.     What follow-up care is needed?   Your doctor may ask you to make visits to the office to check on your progress. Be sure to keep these visits.  What drugs may be needed?   The doctor may order drugs to help the physical signs of anxiety. Make sure that you take the drugs as taught to you by the doctor. Talk with your doctor about any side effects and ask how long you should take the drug.  Will physical activity be limited?   You may take part in physical activities. Some people are limited because of their anxiety or fear. Talk with your doctor about the right amount of activity for you.  What changes to diet are needed?   Eat a variety of healthy foods and limit drinks with caffeine. You should avoid alcohol, energy drinks, and  over-the-counter stimulants.  What problems could happen?   If your anxiety is not treated, it can result in:  Staying away from work or social events  Not being able to do everyday tasks  Keeping away from family and friends  What can be done to prevent this health problem?   Learn what events, people, or things upset you. Limit your contact with them.  Talk about your feelings. Talk to someone who can help you see how your thoughts at certain times may raise your anxiety.  Seek support from your friends and family. Find someone who calms you down. Ask if you can call them when you are getting anxious.  When do I need to call the doctor?   You feel you may harm yourself or someone else.  You can also call a mental health hotline for help.  You have any physical symptoms, such as chest pain, trouble breathing, or severe belly pain, that could be a sign of a serious problem.  If you are short of breath.  If you do not feel like you can be alone.  Teach Back: Helping You Understand   The Teach Back Method helps you understand the information we are giving you. After you talk with the staff, tell them in your own words what you learned. This helps to make sure the staff has described each thing clearly. It also helps to explain things that may have been confusing. Before going home, make sure you can do these:  I can tell you about my condition and the drugs I need to take.  I can tell you what may help lower my anxiety.  I can tell you what I will do if it is hard to breathe or I have chest pain.  I can tell you what I will do if I do not feel safe or cannot be alone.  Where can I learn more?   [employer]  https://www.gricelda.org/Learn-More/Mental-Health-Conditions/Anxiety-Disorders   National Health Service  https://www.nhs.uk/conditions/generalised-anxiety-disorder/symptoms/   National [place] of Health ? [facility]  https://www.sudha.nih.gov/health/relieving-stress-anxiety-svseeufxs-zmdtpsjjgz-izzqomixnr   National  [place] of Mental Health  http://www.nimh.nih.gov/health/publications/anxiety-disorders/complete-index.shtml   Last Reviewed Date   2021-06-08  Consumer Information Use and Disclaimer   This information is not specific medical advice and does not replace information you receive from your health care provider. This is only a brief summary of general information. It does NOT include all information about conditions, illnesses, injuries, tests, procedures, treatments, therapies, discharge instructions or life-style choices that may apply to you. You must talk with your health care provider for complete information about your health and treatment options. This information should not be used to decide whether or not to accept your health care providers advice, instructions or recommendations. Only your health care provider has the knowledge and training to provide advice that is right for you.  Copyright   Copyright © 2021 UpToDate, Inc. and its affiliates and/or licensors. All rights reserved.  Patient Education       Attention Deficit Hyperactivity Disorder (ADHD) Discharge Instructions   About this topic   Attention deficit hyperactivity disorder is also called ADHD or ADD. Most often, this starts at a young age. This disorder makes it hard to focus or sit still. People with ADHD may find it hard to make good decisions. Children with ADHD may have trouble in school and at home. Adults may have problems at work. People with ADHD may also have a problem getting along well with others. While there is no cure for ADHD, you and your doctor can work on a plan that is best for you to treat the signs of ADHD.  What care is needed at home?   Ask your doctor what you need to do when you go home. Make sure you ask questions if you do not understand what the doctor says. This way you will know what you need to do.  Try to get enough sleep at night. Most often adults need 7 to 8 hours each night and children need 8 to 10 hours  each night. Rest during the day if you are tired.  Eat and sleep at the same times every day.  Have a plan for where to keep things in your home. Use checklists, things to help you remember, and alarms. A list of things you may need to find in a hurry can be helpful. These can help you put things in the right place and manage your time.  Work on getting better at reading and taking notes.  Have a space that is just for work or homework so you will be able to pay attention. This may be an office or a desk facing a wall. Try using headphones so you cannot hear the other noises.  Do one thing at a time. Keep a list of the next things you were planning to do or say.  Break large jobs or things you have to do into smaller jobs. This will make them easier to finish. Reward yourself along the way.  Have rules that are clear and easy to follow.   Look at where you are the strongest. Give rewards for good behavior, finished schoolwork, or for doing a good job at work.  Do not do too many things that might cause stress.  What follow-up care is needed?   Your doctor may ask you to make visits to the office to check on your progress. Be sure to keep these visits.  Your doctor may send you to a special mental health doctor. This person will talk with you about the problems you are having. Then, you can work together to find ways to help you manage them.  Your doctor may suggest you try talk therapy to help you live well with your ADHD.  What drugs may be needed?   The doctor may order drugs to:  Help lower your worry  Help you to pay attention  Help you be more calm  Help you be less impulsive  Help keep things in your life balanced  Care for low mood  Will physical activity be limited?   Physical activity will help keep your mind and body in good shape. Talk with your doctor about the right amount of activity for you.  What problems could happen?   Feeling badly about yourself  Raise the chances of you hurting yourself, such as  injury in a car accident  Not do well in school and work  Trouble making friends or getting along well with others  Raise your chance to abuse alcohol or drugs  What can be done to prevent this health problem?   The cause of ADHD is not clear, but to lower the chance of your child having ADHD:  Do not drink beer, wine, or mixed drinks (alcohol) while you are pregnant.  Do not smoke or use illegal drugs while you are pregnant.  Keep your child away from things like harmful toxins and chemicals.  Keep your child from having too much time in front of computers, TV, and video games.  Get plenty of exercise.  Be more aware of thoughts, emotions, and experiences each moment. This is mindfulness.  When do I need to call the doctor?   Drugs are not working  Symptoms are getting worse  Teach Back: Helping You Understand   The Teach Back Method helps you understand the information we are giving you. After you talk with the staff, tell them in your own words what you learned. This helps to make sure the staff has described each thing clearly. It also helps to explain things that may have been confusing. Before going home, make sure you can do these:  I can tell you about my condition.  I can tell you ways to help me pay attention.  I can tell you what I will do if I think my drugs are not working.  Where can I learn more?   HelpGuide.org  http://www.helpguide.org/articles/add-adhd/attention-deficit-disorder-adhd-parenting-tips.htm   KidsHealth  http://kidshealth.org/parent/medical/learning/adhd.html   National [place] of Mental Health  http://www.nimh.nih.gov/health/topics/attention-deficit-hyperactivity-disorder-adhd/index.shtml   Last Reviewed Date   2020-06-14  Consumer Information Use and Disclaimer   This information is not specific medical advice and does not replace information you receive from your health care provider. This is only a brief summary of general information. It does NOT include all information about  conditions, illnesses, injuries, tests, procedures, treatments, therapies, discharge instructions or life-style choices that may apply to you. You must talk with your health care provider for complete information about your health and treatment options. This information should not be used to decide whether or not to accept your health care providers advice, instructions or recommendations. Only your health care provider has the knowledge and training to provide advice that is right for you.  Copyright   Copyright © 2021 UpToDate, Inc. and its affiliates and/or licensors. All rights reserved.      Risks, benefits, and alternatives discussed with patient, Patient verbalized understanding of discussed plan of care. Asked patient if any further questions, answered no.    Future Appointments   Date Time Provider Department [place]   3/13/2023 11:20 AM [facility]

## 2023-01-05 ENCOUNTER — PATIENT MESSAGE (OUTPATIENT)
Dept: PRIMARY CARE CLINIC | Facility: CLINIC | Age: 58
End: 2023-01-05
Payer: COMMERCIAL

## 2023-01-05 PROBLEM — M75.122 COMPLETE ROTATOR CUFF TEAR OR RUPTURE OF LEFT SHOULDER, NOT SPECIFIED AS TRAUMATIC: Status: ACTIVE | Noted: 2023-01-05

## 2023-01-05 PROBLEM — M19.012 OSTEOARTHRITIS OF LEFT AC (ACROMIOCLAVICULAR) JOINT: Status: ACTIVE | Noted: 2023-01-05

## 2023-01-05 PROBLEM — S42.253A GREATER TUBEROSITY OF HUMERUS FRACTURE: Status: ACTIVE | Noted: 2023-01-05

## 2023-01-05 PROBLEM — S43.432A GLENOID LABRAL TEAR, LEFT, INITIAL ENCOUNTER: Status: ACTIVE | Noted: 2023-01-05

## 2023-01-05 PROBLEM — M67.922 TENDINOPATHY OF LEFT BICEPS TENDON: Status: ACTIVE | Noted: 2023-01-05

## 2023-01-10 DIAGNOSIS — Z01.818 PREOPERATIVE CLEARANCE: Primary | ICD-10-CM

## 2023-01-11 ENCOUNTER — PATIENT MESSAGE (OUTPATIENT)
Dept: PRIMARY CARE CLINIC | Facility: CLINIC | Age: 58
End: 2023-01-11
Payer: COMMERCIAL

## 2023-01-13 LAB
ANION GAP SERPL CALC-SCNC: 8 MMOL/L (ref 3–11)
APPEARANCE, UA: CLEAR
BACTERIA SPEC CULT: ABNORMAL /HPF
BASOPHILS NFR BLD: 0.8 % (ref 0–3)
BILIRUB UR QL STRIP: NEGATIVE MG/DL
BUN SERPL-MCNC: 10 MG/DL (ref 7–18)
BUN/CREAT SERPL: 14.7 RATIO (ref 7–18)
CALCIUM SERPL-MCNC: 9.6 MG/DL (ref 8.8–10.5)
CHLORIDE SERPL-SCNC: 103 MMOL/L (ref 100–108)
CO2 SERPL-SCNC: 30 MMOL/L (ref 21–32)
COLOR UR: ABNORMAL
CREAT SERPL-MCNC: 0.68 MG/DL (ref 0.55–1.02)
EOSINOPHIL NFR BLD: 1.9 % (ref 1–3)
ERYTHROCYTE [DISTWIDTH] IN BLOOD BY AUTOMATED COUNT: 13.4 % (ref 12.5–18)
GFR ESTIMATION: > 60
GLUCOSE (UA): NORMAL MG/DL
GLUCOSE SERPL-MCNC: 102 MG/DL (ref 70–110)
HCT VFR BLD AUTO: 38.7 % (ref 37–47)
HGB BLD-MCNC: 12.5 G/DL (ref 12–16)
HGB UR QL STRIP: 50 /UL
KETONES UR QL STRIP: NEGATIVE MG/DL
LEUKOCYTE ESTERASE UR QL STRIP: NEGATIVE /UL
LYMPHOCYTES NFR BLD: 27.3 % (ref 25–40)
MCH RBC QN AUTO: 26.9 PG (ref 27–31.2)
MCHC RBC AUTO-ENTMCNC: 32.3 G/DL (ref 31.8–35.4)
MCV RBC AUTO: 83.4 FL (ref 80–97)
MONOCYTES NFR BLD: 5.7 % (ref 1–15)
NEUTROPHILS # BLD AUTO: 3.05 10*3/UL (ref 1.8–7.7)
NEUTROPHILS NFR BLD: 64.1 % (ref 37–80)
NITRITE UR QL STRIP: NEGATIVE
NUCLEATED RED BLOOD CELLS: 0 %
PH UR STRIP: 5 PH (ref 5–9)
PLATELETS: 244 10*3/UL (ref 142–424)
POTASSIUM SERPL-SCNC: 4 MMOL/L (ref 3.6–5.2)
PROT UR QL STRIP: NEGATIVE MG/DL
RBC # BLD AUTO: 4.64 10*6/UL (ref 4.2–5.4)
RBC #/AREA URNS HPF: ABNORMAL /HPF (ref 0–2)
SERVICE COMMENT 03: ABNORMAL
SODIUM BLD-SCNC: 141 MMOL/L (ref 135–145)
SP GR UR STRIP: 1.01 (ref 1–1.03)
SPECIMEN COLLECTION METHOD, URINE: ABNORMAL
SQUAMOUS EPITHELIAL, UA: ABNORMAL /LPF
UROBILINOGEN UR STRIP-ACNC: NORMAL MG/DL
WBC # BLD: 4.8 10*3/UL (ref 4.6–10.2)
WBC #/AREA URNS HPF: ABNORMAL /HPF (ref 0–5)

## 2023-01-17 ENCOUNTER — PATIENT MESSAGE (OUTPATIENT)
Dept: PRIMARY CARE CLINIC | Facility: CLINIC | Age: 58
End: 2023-01-17
Payer: COMMERCIAL

## 2023-01-17 DIAGNOSIS — F90.2 ATTENTION DEFICIT HYPERACTIVITY DISORDER (ADHD), COMBINED TYPE: ICD-10-CM

## 2023-01-17 RX ORDER — LISDEXAMFETAMINE DIMESYLATE 50 MG/1
50 CAPSULE ORAL EVERY MORNING
Qty: 30 CAPSULE | Refills: 0 | Status: SHIPPED | OUTPATIENT
Start: 2023-01-17 | End: 2023-02-20 | Stop reason: SDUPTHER

## 2023-01-17 NOTE — TELEPHONE ENCOUNTER
Raiza, please let patient know her chest XRAY, EKG, and all of her labs are normal but she does have some blood in her urine so ask her if she is having any issues urinating or UTI symptoms and let me know. Otherwise, she is cleared for her surgery.

## 2023-01-23 PROBLEM — S46.012A TRAUMATIC COMPLETE TEAR OF LEFT ROTATOR CUFF: Status: ACTIVE | Noted: 2023-01-23

## 2023-02-20 DIAGNOSIS — F90.2 ATTENTION DEFICIT HYPERACTIVITY DISORDER (ADHD), COMBINED TYPE: ICD-10-CM

## 2023-02-21 RX ORDER — LISDEXAMFETAMINE DIMESYLATE 50 MG/1
50 CAPSULE ORAL EVERY MORNING
Qty: 30 CAPSULE | Refills: 0 | Status: SHIPPED | OUTPATIENT
Start: 2023-02-21 | End: 2023-03-22 | Stop reason: SDUPTHER

## 2023-03-22 ENCOUNTER — OFFICE VISIT (OUTPATIENT)
Dept: PRIMARY CARE CLINIC | Facility: CLINIC | Age: 58
End: 2023-03-22
Payer: COMMERCIAL

## 2023-03-22 VITALS
SYSTOLIC BLOOD PRESSURE: 97 MMHG | HEART RATE: 79 BPM | DIASTOLIC BLOOD PRESSURE: 67 MMHG | BODY MASS INDEX: 21.68 KG/M2 | HEIGHT: 61 IN | WEIGHT: 114.81 LBS | OXYGEN SATURATION: 100 %

## 2023-03-22 DIAGNOSIS — E78.00 PURE HYPERCHOLESTEROLEMIA: ICD-10-CM

## 2023-03-22 DIAGNOSIS — Z12.11 SCREENING FOR COLORECTAL CANCER: ICD-10-CM

## 2023-03-22 DIAGNOSIS — Z13.29 THYROID DISORDER SCREEN: ICD-10-CM

## 2023-03-22 DIAGNOSIS — Z12.12 SCREENING FOR COLORECTAL CANCER: ICD-10-CM

## 2023-03-22 DIAGNOSIS — Z13.1 DIABETES MELLITUS SCREENING: ICD-10-CM

## 2023-03-22 DIAGNOSIS — F41.1 GENERALIZED ANXIETY DISORDER: ICD-10-CM

## 2023-03-22 DIAGNOSIS — F90.2 ATTENTION DEFICIT HYPERACTIVITY DISORDER (ADHD), COMBINED TYPE: ICD-10-CM

## 2023-03-22 DIAGNOSIS — Z12.31 BREAST CANCER SCREENING BY MAMMOGRAM: ICD-10-CM

## 2023-03-22 DIAGNOSIS — Z00.00 ANNUAL PHYSICAL EXAM: Primary | ICD-10-CM

## 2023-03-22 DIAGNOSIS — N95.9 MENOPAUSAL DISORDER: ICD-10-CM

## 2023-03-22 DIAGNOSIS — M19.019 ARTHRITIS OF SHOULDER: ICD-10-CM

## 2023-03-22 DIAGNOSIS — E55.9 VITAMIN D DEFICIENCY: ICD-10-CM

## 2023-03-22 DIAGNOSIS — R35.0 URINE FREQUENCY: ICD-10-CM

## 2023-03-22 PROCEDURE — 1160F RVW MEDS BY RX/DR IN RCRD: CPT | Mod: CPTII,S$GLB,, | Performed by: NURSE PRACTITIONER

## 2023-03-22 PROCEDURE — 3074F SYST BP LT 130 MM HG: CPT | Mod: CPTII,S$GLB,, | Performed by: NURSE PRACTITIONER

## 2023-03-22 PROCEDURE — 99396 PREV VISIT EST AGE 40-64: CPT | Mod: S$GLB,,, | Performed by: NURSE PRACTITIONER

## 2023-03-22 PROCEDURE — 3078F DIAST BP <80 MM HG: CPT | Mod: CPTII,S$GLB,, | Performed by: NURSE PRACTITIONER

## 2023-03-22 PROCEDURE — 3074F PR MOST RECENT SYSTOLIC BLOOD PRESSURE < 130 MM HG: ICD-10-PCS | Mod: CPTII,S$GLB,, | Performed by: NURSE PRACTITIONER

## 2023-03-22 PROCEDURE — 1159F MED LIST DOCD IN RCRD: CPT | Mod: CPTII,S$GLB,, | Performed by: NURSE PRACTITIONER

## 2023-03-22 PROCEDURE — 3008F PR BODY MASS INDEX (BMI) DOCUMENTED: ICD-10-PCS | Mod: CPTII,S$GLB,, | Performed by: NURSE PRACTITIONER

## 2023-03-22 PROCEDURE — 1160F PR REVIEW ALL MEDS BY PRESCRIBER/CLIN PHARMACIST DOCUMENTED: ICD-10-PCS | Mod: CPTII,S$GLB,, | Performed by: NURSE PRACTITIONER

## 2023-03-22 PROCEDURE — 3044F PR MOST RECENT HEMOGLOBIN A1C LEVEL <7.0%: ICD-10-PCS | Mod: CPTII,S$GLB,, | Performed by: NURSE PRACTITIONER

## 2023-03-22 PROCEDURE — 3044F HG A1C LEVEL LT 7.0%: CPT | Mod: CPTII,S$GLB,, | Performed by: NURSE PRACTITIONER

## 2023-03-22 PROCEDURE — 3008F BODY MASS INDEX DOCD: CPT | Mod: CPTII,S$GLB,, | Performed by: NURSE PRACTITIONER

## 2023-03-22 PROCEDURE — 1159F PR MEDICATION LIST DOCUMENTED IN MEDICAL RECORD: ICD-10-PCS | Mod: CPTII,S$GLB,, | Performed by: NURSE PRACTITIONER

## 2023-03-22 PROCEDURE — 99396 PR PREVENTIVE VISIT,EST,40-64: ICD-10-PCS | Mod: S$GLB,,, | Performed by: NURSE PRACTITIONER

## 2023-03-22 PROCEDURE — 3078F PR MOST RECENT DIASTOLIC BLOOD PRESSURE < 80 MM HG: ICD-10-PCS | Mod: CPTII,S$GLB,, | Performed by: NURSE PRACTITIONER

## 2023-03-22 RX ORDER — LISDEXAMFETAMINE DIMESYLATE 50 MG/1
50 CAPSULE ORAL EVERY MORNING
Qty: 30 CAPSULE | Refills: 0 | Status: SHIPPED | OUTPATIENT
Start: 2023-03-22 | End: 2023-04-18 | Stop reason: SDUPTHER

## 2023-03-22 RX ORDER — ESTRADIOL 1 MG/1
1 TABLET ORAL DAILY
Qty: 90 TABLET | Refills: 3 | Status: SHIPPED | OUTPATIENT
Start: 2023-03-22 | End: 2024-03-26

## 2023-03-22 NOTE — PATIENT INSTRUCTIONS
RTC in 3 months for F/U or sooner if needed.    Patient Education       Yearly Physical for Adults   About this topic   Most people do not want to be sick. Having a checkup each year with your doctor is one way to help you stay healthy. You may need to see your doctor more or less often. How often you need to go to the doctor depends on your age. Your family and medical history also play a role in how often you need to go to the doctor. Going to see your doctor on a routine basis can help you find problems early or even before they start. This may make it easier to treat or cure your problem.  General   Your doctor will talk about many things during your checkup. Your doctor may ask about:  Your medical and family history.  All the drugs you are taking. Be sure to include all prescription, over the counter, and herbal supplements. Tell the doctor if you have any drug allergy. Bring a list of drugs you take with you.  How you are feeling and if you are having any problems.  Risky behaviors like smoking, drinking alcohol, using illegal drugs, not wearing seatbelts, having unprotected sex, etc.  Your doctor will do a physical exam and may check your:  Height and weight  Blood pressure  Reflexes  Memory  Vision  Hearing  Your doctor may order:  Lab tests  ECG to check your heart rhythm  X-rays  Tests or treatments based on your exam  What lifestyle changes are needed?   Your doctor may suggest you make changes to your lifestyle at this visit. The doctor may talk with you about being more active or lowering stress levels. Ask your doctor what you need to do.  What drugs may be needed?   Your doctor may order drugs or vaccines to protect you from illnesses.  What changes to diet are needed?   Talk to your doctor to see if any changes are needed to your diet.  When do I need to call the doctor?   Call your doctor if you need to learn about any test results. Together you can make a plan for more care.  Helpful tips   Make a  list of questions for your doctor before you go. This will help you remember to ask about any concerns. Write down any answers from your doctor so you can look over them after your visit.   Tell your doctor about any changes in your body or health since your last visit.  Ask your doctor about any screening tests you need.  Where can I learn more?   American Academy of [employer]icians  http://familydoctor.org/familydoctor/en/prevention-wellness/staying-healthy/healthy-living/preventive-services-for-healthy-living.printerview.html   Centers for Disease Control  http://www.cdc.gov/family/checkup/   Last Reviewed Date   2019-04-22  Consumer Information Use and Disclaimer   This information is not specific medical advice and does not replace information you receive from your health care provider. This is only a brief summary of general information. It does NOT include all information about conditions, illnesses, injuries, tests, procedures, treatments, therapies, discharge instructions or life-style choices that may apply to you. You must talk with your health care provider for complete information about your health and treatment options. This information should not be used to decide whether or not to accept your health care providers advice, instructions or recommendations. Only your health care provider has the knowledge and training to provide advice that is right for you.  Copyright   Copyright © 2021 [employer] and its affiliates and/or licensors. All rights reserved.

## 2023-03-22 NOTE — PROGRESS NOTES
Subjective:       Patient ID: Isabella Santana is a 57 y.o. female.    Chief Complaint: Annual Exam    58 y/o female presents for annual visit.    Patient feels well today with no new issues or concerns.    Ms. Santana had a left rotator cuff, tendon tear and labrum tear repaired by Dr. Medina on 01/23/23 which she tolerated well. She is now doing PT 3 days a week and it is going well. She is now back to work since a week after surgery.     Anxiety - chronic, controlled well on clonazepam as needed. Denies se/ar to medication. Denies being depressed. Denies SI/HI.    ADHD - takes Vyvanse 50 mg daily and it is working well. No s/e of CP, palpitations, syncope, Denies se/at to medication.  checked and is appropriate.    Had a total hysterectomy in 2012 and she takes Estradiol daily which works well.     Due for Mammogram, will order today.    UTD with COVID and FLU vaccines.                  Past Medical History:   Diagnosis Date    ADHD (attention deficit hyperactivity disorder)     Anxiety     Hormone deficiency     Insomnia        Past Surgical History:   Procedure Laterality Date    BREAST BIOPSY Left     HYSTERECTOMY      ROTATOR CUFF REPAIR Left 01/2023    WISDOM TOOTH EXTRACTION         Family History   Problem Relation Age of Onset    Diabetes Mother     Cancer Father     Heart disease Father     Cancer Sister        Social History     Tobacco Use    Smoking status: Former     Types: Vaping with nicotine    Smokeless tobacco: Never   Substance Use Topics    Alcohol use: Yes    Drug use: Never       Patient Active Problem List   Diagnosis    Attention deficit hyperactivity disorder    Breast lump    Primary insomnia    Menopausal disorder    Generalized anxiety disorder    Superior glenoid labrum lesion of left shoulder    Complete rotator cuff tear or rupture of left shoulder, not specified as traumatic    Osteoarthritis of left AC (acromioclavicular) joint    Tendinopathy of left biceps tendon    Greater  "tuberosity of humerus fracture    Traumatic complete tear of left rotator cuff       Immunization History   Administered Date(s) Administered    COVID-19, MRNA, LN-S, PF (Pfizer) (Purple Cap) 07/27/2021, 08/17/2021    Influenza - Quadrivalent - PF *Preferred* (6 months and older) 10/05/2020, 09/23/2021, 10/24/2022           Review of Systems   Constitutional:  Negative for activity change, appetite change, chills, diaphoresis, fatigue and fever.   HENT:  Negative for congestion, ear pain, sinus pain, tinnitus and trouble swallowing.    Eyes:  Negative for visual disturbance.   Respiratory:  Negative for cough, chest tightness, shortness of breath and wheezing.    Cardiovascular:  Negative for chest pain, palpitations and leg swelling.   Gastrointestinal:  Negative for abdominal distention, abdominal pain, blood in stool, constipation, diarrhea, nausea and vomiting.   Endocrine: Negative for cold intolerance, heat intolerance, polydipsia, polyphagia and polyuria.   Genitourinary:  Negative for decreased urine volume, dysuria, frequency, hematuria and urgency.   Musculoskeletal:  Positive for arthralgias (left shoulder). Negative for back pain, gait problem and neck pain.   Skin:  Negative for color change and rash.   Neurological:  Negative for dizziness, syncope, weakness, light-headedness, numbness and headaches.   Hematological:  Negative for adenopathy. Does not bruise/bleed easily.   Psychiatric/Behavioral:  Negative for behavioral problems, confusion, dysphoric mood and suicidal ideas.    Objective:     Vitals:    03/22/23 0908   BP: 97/67   BP Location: Right arm   Patient Position: Sitting   BP Method: Medium (Automatic)   Pulse: 79   SpO2: 100%   Weight: 52.1 kg (114 lb 12.8 oz)   Height: 5' 1" (1.549 m)         Physical Exam  Vitals and nursing note reviewed.   Constitutional:       General: She is not in acute distress.     Appearance: Normal appearance. She is not ill-appearing or diaphoretic.   HENT:     "  Head: Normocephalic and atraumatic.      Mouth/Throat:      Mouth: Mucous membranes are moist.      Pharynx: Oropharynx is clear.   Eyes:      Extraocular Movements: Extraocular movements intact.      Conjunctiva/sclera: Conjunctivae normal.      Pupils: Pupils are equal, round, and reactive to light.   Neck:      Thyroid: No thyromegaly or thyroid tenderness.   Cardiovascular:      Rate and Rhythm: Normal rate and regular rhythm.      Pulses: Normal pulses.      Heart sounds: Normal heart sounds.   Pulmonary:      Effort: Pulmonary effort is normal.      Breath sounds: Normal breath sounds. No wheezing or rales.   Abdominal:      General: Bowel sounds are normal. There is no distension.      Palpations: Abdomen is soft.      Tenderness: There is no abdominal tenderness.   Musculoskeletal:         General: Tenderness (left shoulder - postop) present. Normal range of motion.      Cervical back: Normal range of motion and neck supple.      Right lower leg: No edema.      Left lower leg: No edema.   Lymphadenopathy:      Cervical: No cervical adenopathy.   Skin:     General: Skin is warm and dry.      Capillary Refill: Capillary refill takes less than 2 seconds.      Findings: No rash.   Neurological:      General: No focal deficit present.      Mental Status: She is alert and oriented to person, place, and time.   Psychiatric:         Mood and Affect: Mood and affect normal.         Behavior: Behavior normal. Behavior is cooperative.         Thought Content: Thought content normal. Thought content does not include homicidal or suicidal ideation. Thought content does not include homicidal or suicidal plan.         Judgment: Judgment normal.       Office Visit on 03/22/2023   Component Date Value Ref Range Status    WBC 03/22/2023 5.14  4.3 - 10.8 X 10 3/ul Final    RBC 03/22/2023 4.16 (L)  4.2 - 5.4 X 10 6/ul Final    RDW-SD 03/22/2023 41.6  37 - 54 fl Final    Hemoglobin 03/22/2023 11.4 (L)  12 - 16 g/dL Final     Hematocrit 03/22/2023 34.8 (L)  37 - 47 % Final    MCV 03/22/2023 83.7  82 - 100 fl Final    MCH 03/22/2023 27.4  27 - 32 pg Final    MCHC 03/22/2023 32.8  32 - 36 g/dL Final    Platelets 03/22/2023 238  135 - 400 X 10 3/ul Final    Neutrophils 03/22/2023 63.0  34 - 71.1 % Final    Lymphocytes 03/22/2023 29.6  19.3 - 53.1 % Final    Monocytes 03/22/2023 5.6  4.7 - 12.5 % Final    Eosinophils 03/22/2023 1.2  0.7 - 7.0 % Final    Basophils 03/22/2023 0.4  0.2 - 1.2 % Final    Neutrophils Absolute 03/22/2023 3.24  2.15 - 7.56 X 10 3/ul Final    Lymphocytes Absolute 03/22/2023 1.52  0.86 - 4.75 X 10 3/ul Final    Monocytes Absolute 03/22/2023 0.29  0.22 - 1.08 X 10 3/ul Final    Eosinophils Absolute 03/22/2023 0.06  0.04 - 0.54 X 10 3/ul Final    Basophils Absolute 03/22/2023 0.02  0.00 - 0.22 X 10 3/ul Final    Immature Granulocytes Absolute 03/22/2023 0.01  0 - 0.04 X 10 3/ul Final    Immature Granulocytes 03/22/2023 0.2  0 - 0.5 % Final    nRBC# 03/22/2023 0.0  0 - 0.2 /100 WBC Final    nRBC Count Absolute 03/22/2023 0.000  0 - 0.012 x 10 3/ul Final    Glucose 03/22/2023 95  74 - 106 mg/dL Final    BUN 03/22/2023 8.9  6 - 20 mg/dL Final    Creatinine 03/22/2023 0.76  0.50 - 0.90 mg/dL Final    AST 03/22/2023 14  0 - 32 U/L Final    ALT (SGPT) 03/22/2023 12  0 - 33 U/L Final    Alkaline Phosphatase 03/22/2023 59  35 - 105 U/L Final    Calcium 03/22/2023 9.7  8.6 - 10.2 mg/dL Final    Protein, Total 03/22/2023 6.5  6.4 - 8.3 g/dL Final    Albumin 03/22/2023 4.8  3.5 - 5.2 g/dL Final    BILIRUBIN, TOTAL 03/22/2023 0.37  0.00 - 1.20 mg/dL Final    Sodium 03/22/2023 139  136 - 145 mmol/L Final    Potassium 03/22/2023 4.2  3.5 - 5.1 mmol/L Final    Chloride 03/22/2023 100  98 - 107 mmol/L Final    CO2 03/22/2023 29  22 - 29 mmol/L Final    Globulin 03/22/2023 1.7  1.5 - 4.5 g/dL Final    Albumin/Globulin Ratio 03/22/2023 2.8 (H)  1.0 - 2.7 Final    BUN/Creatinine Ratio 03/22/2023 11.7  6 - 20 Final    GFR ESTIMATION  03/22/2023 90.21  >60.00 Final    Anion Gap 03/22/2023 10.0  8.0 - 17.0 mmol/L Final    Hemoglobin A1C 03/22/2023 4.8  4.0 - 6.0 % Final    EST AVERAGE GLUCOSE 03/22/2023 90  NORMAL MG/DL Final    Cholesterol 03/22/2023 211 (H)  100 - 200 mg/dL Final    Triglycerides 03/22/2023 73  0 - 150 mg/dL Final    HDL 03/22/2023 109  >60 mg/dL Final    LDL Cholesterol 03/22/2023 87.4  0 - 100 mg/dL Final    LDL/HDL Ratio 03/22/2023 0.8 (L)  1 - 3 Final    VITAMIN D (25OHD) 03/22/2023 43.1  >30 ng/mL Final    TSH w/reflex to FT4 03/22/2023 1.31  0.27 - 4.20 uIU/mL Final   Orders Only on 01/13/2023   Component Date Value Ref Range Status    Specimen Collection Method 01/13/2023 Cl Catch Mid Stream   Final    Color, UA 01/13/2023 Pale Yellow  Yellow Final    Appearance, UA 01/13/2023 Clear  Clear Final    pH, UA 01/13/2023 5.0  5.0 - 9.0 pH Final    Specific Gravity, UA 01/13/2023 1.015  1.000 - 1.030 Final    Protein, UA 01/13/2023 Negative  Negative mg/dL Final    Glucose, UA 01/13/2023 Normal  Normal mg/dL Final    Ketones, UA 01/13/2023 Negative  Negative mg/dL Final    Occult Blood UA 01/13/2023 50 (A)  Negative /uL Final    Nitrite, UA 01/13/2023 Negative  Negative Final    Bilirubin (UA) 01/13/2023 Negative  Negative mg/dL Final    Urobilinogen, UA 01/13/2023 Normal  Normal mg/dL Final    Leukocytes, UA 01/13/2023 Negative  Negative /uL Final    RBC, UA 01/13/2023 0-2  0 - 2 /HPF Final    WBC, UA 01/13/2023 None  0 - 5 /HPF Final    Squam Epithel, UA 01/13/2023 1+ Few  /LPF Final    Bacteria 01/13/2023 +/- Rare  Few/HPF /HPF Final    Service Comment 03 01/13/2023 No, Criteria Not Met   Final   Orders Only on 01/10/2023   Component Date Value Ref Range Status    WBC 01/13/2023 4.8  4.6 - 10.2 10*3/uL Final    RBC 01/13/2023 4.64  4.2 - 5.4 10*6/uL Final    Hemoglobin 01/13/2023 12.5  12.0 - 16.0 g/dL Final    Hematocrit 01/13/2023 38.7  37.0 - 47.0 % Final    MCV 01/13/2023 83.4  80 - 97 fL Final    MCH 01/13/2023 26.9 (L)   27.0 - 31.2 pg Final    MCHC 01/13/2023 32.3  31.8 - 35.4 g/dL Final    RDW RBC Auto-Rto 01/13/2023 13.4  12.5 - 18.0 % Final    Platelets 01/13/2023 244  142 - 424 10*3/uL Final    Neutrophils 01/13/2023 64.1  37 - 80 % Final    Lymphocytes 01/13/2023 27.3  25 - 40 % Final    Monocytes 01/13/2023 5.7  1 - 15 % Final    Eosinophils 01/13/2023 1.9  1 - 3 % Final    Basophils 01/13/2023 0.8  0 - 3 % Final    nRBC# 01/13/2023 0.0  % Final    Neutrophils Absolute 01/13/2023 3.05  1.8 - 7.7 10*3/uL Final    Sodium 01/13/2023 141  135 - 145 mmol/L Final    Potassium 01/13/2023 4.0  3.6 - 5.2 mmol/L Final    Chloride 01/13/2023 103  100 - 108 mmol/L Final    CO2 01/13/2023 30  21 - 32 mmol/L Final    Anion Gap 01/13/2023 8.0  3.0 - 11.0 mmol/L Final    BUN 01/13/2023 10  7 - 18 mg/dL Final    Creatinine 01/13/2023 0.68  0.55 - 1.02 mg/dL Final    GFR ESTIMATION 01/13/2023 > 60  >60 Final    BUN/Creatinine Ratio 01/13/2023 14.70  7 - 18 Ratio Final    Glucose 01/13/2023 102  70 - 110 mg/dL Final    Calcium 01/13/2023 9.6  8.8 - 10.5 mg/dL Final         Assessment:      1. Annual physical exam    2. Arthritis of shoulder    3. Attention deficit hyperactivity disorder (ADHD), combined type Well controlled   4. Generalized anxiety disorder    5. Menopausal disorder    6. Breast cancer screening by mammogram    7. Screening for colorectal cancer    8. Urine frequency    9. Vitamin D deficiency    10. Pure hypercholesterolemia    11. Diabetes mellitus screening    12. Thyroid disorder screen          Plan:     Annual physical exam  Comments:  Will review labs and determine POC based on results  Orders:  -     CBC Auto Differential; Future; Expected date: 03/22/2023  -     Comprehensive Metabolic Panel; Future; Expected date: 03/22/2023  -     Hemoglobin A1C; Future; Expected date: 03/22/2023  -     Lipid Panel; Future; Expected date: 03/22/2023  -     TSH w/reflex to FT4; Future; Expected date: 03/22/2023    Arthritis of  shoulder  -     Ambulatory referral/consult to Orthopedics    Attention deficit hyperactivity disorder (ADHD), combined type  Comments:  Vyvanse refilled, F/U in 3 months.  Orders:  -     lisdexamfetamine (VYVANSE) 50 MG capsule; Take 1 capsule (50 mg total) by mouth every morning.  Dispense: 30 capsule; Refill: 0    Generalized anxiety disorder    Menopausal disorder  -     estradioL (ESTRACE) 1 MG tablet; Take 1 tablet (1 mg total) by mouth once daily.  Dispense: 90 tablet; Refill: 3    Breast cancer screening by mammogram  -     Mammo Digital Screening Bilat; Future; Expected date: 03/22/2023    Screening for colorectal cancer  -     Cologuard Screening (Multitarget Stool DNA); Future; Expected date: 03/22/2023    Urine frequency  -     Vitamin D; Future; Expected date: 03/22/2023    Vitamin D deficiency    Pure hypercholesterolemia  -     Lipid Panel; Future; Expected date: 03/22/2023    Diabetes mellitus screening  -     Hemoglobin A1C; Future; Expected date: 03/22/2023    Thyroid disorder screen  -     TSH w/reflex to FT4; Future; Expected date: 03/22/2023         Current Outpatient Medications   Medication Sig Dispense Refill    clonazePAM (KLONOPIN) 1 MG tablet Take 1 tablet (1 mg total) by mouth 2 (two) times daily as needed for Anxiety. 60 tablet 2    estradioL (ESTRACE) 1 MG tablet Take 1 tablet (1 mg total) by mouth once daily. 90 tablet 3    lisdexamfetamine (VYVANSE) 50 MG capsule Take 1 capsule (50 mg total) by mouth every morning. 30 capsule 0     No current facility-administered medications for this visit.       Medications Discontinued During This Encounter   Medication Reason    ondansetron (ZOFRAN) 8 MG tablet Patient no longer taking    oxyCODONE-acetaminophen (PERCOCET) 5-325 mg per tablet Patient no longer taking    estradioL (ESTRACE) 1 MG tablet Reorder    lisdexamfetamine (VYVANSE) 50 MG capsule Reorder         Health Maintenance   Topic Date Due    Hepatitis C Screening  Never done     TETANUS VACCINE  Never done    Mammogram  Never done    Lipid Panel  03/22/2028       Patient Instructions   RTC in 3 months for F/U or sooner if needed.    Patient Education       Yearly Physical for Adults   About this topic   Most people do not want to be sick. Having a checkup each year with your doctor is one way to help you stay healthy. You may need to see your doctor more or less often. How often you need to go to the doctor depends on your age. Your family and medical history also play a role in how often you need to go to the doctor. Going to see your doctor on a routine basis can help you find problems early or even before they start. This may make it easier to treat or cure your problem.  General   Your doctor will talk about many things during your checkup. Your doctor may ask about:  Your medical and family history.  All the drugs you are taking. Be sure to include all prescription, over the counter, and herbal supplements. Tell the doctor if you have any drug allergy. Bring a list of drugs you take with you.  How you are feeling and if you are having any problems.  Risky behaviors like smoking, drinking alcohol, using illegal drugs, not wearing seatbelts, having unprotected sex, etc.  Your doctor will do a physical exam and may check your:  Height and weight  Blood pressure  Reflexes  Memory  Vision  Hearing  Your doctor may order:  Lab tests  ECG to check your heart rhythm  X-rays  Tests or treatments based on your exam  What lifestyle changes are needed?   Your doctor may suggest you make changes to your lifestyle at this visit. The doctor may talk with you about being more active or lowering stress levels. Ask your doctor what you need to do.  What drugs may be needed?   Your doctor may order drugs or vaccines to protect you from illnesses.  What changes to diet are needed?   Talk to your doctor to see if any changes are needed to your diet.  When do I need to call the doctor?   Call your doctor if  you need to learn about any test results. Together you can make a plan for more care.  Helpful tips   Make a list of questions for your doctor before you go. This will help you remember to ask about any concerns. Write down any answers from your doctor so you can look over them after your visit.   Tell your doctor about any changes in your body or health since your last visit.  Ask your doctor about any screening tests you need.  Where can I learn more?   American Academy of Family Physicians  http://familydoctor.org/familydoctor/en/prevention-wellness/staying-healthy/healthy-living/preventive-services-for-healthy-living.printerview.html   Centers for Disease Control  http://www.cdc.gov/family/checkup/   Last Reviewed Date   2019-04-22  Consumer Information Use and Disclaimer   This information is not specific medical advice and does not replace information you receive from your health care provider. This is only a brief summary of general information. It does NOT include all information about conditions, illnesses, injuries, tests, procedures, treatments, therapies, discharge instructions or life-style choices that may apply to you. You must talk with your health care provider for complete information about your health and treatment options. This information should not be used to decide whether or not to accept your health care providers advice, instructions or recommendations. Only your health care provider has the knowledge and training to provide advice that is right for you.  Copyright   Copyright © 2021 [employer] and its affiliates and/or licensors. All rights reserved.      Risks, benefits, and alternatives discussed with patient, Patient verbalized understanding of discussed plan of care. Asked patient if any further questions, answered no.    Future Appointments   Date Time Provider Department Center   4/25/2023  8:05 AM Kevin Medina MD [facility]   6/26/2023  8:00 AM Stephanie GRANGER  Doug, NP [facility]  Loida Camacho, NP

## 2023-03-23 LAB
ABS NRBC COUNT: 0 X 10 3/UL (ref 0–0.01)
ABSOLUTE BASOPHIL: 0.02 X 10 3/UL (ref 0–0.22)
ABSOLUTE EOSINOPHIL: 0.06 X 10 3/UL (ref 0.04–0.54)
ABSOLUTE IMMATURE GRAN: 0.01 X 10 3/UL (ref 0–0.04)
ABSOLUTE LYMPHOCYTE: 1.52 X 10 3/UL (ref 0.86–4.75)
ABSOLUTE MONOCYTE: 0.29 X 10 3/UL (ref 0.22–1.08)
ALBUMIN SERPL-MCNC: 4.8 G/DL (ref 3.5–5.2)
ALBUMIN/GLOB SERPL ELPH: 2.8 {RATIO} (ref 1–2.7)
ALP ISOS SERPL LEV INH-CCNC: 59 U/L (ref 35–105)
ALT (SGPT): 12 U/L (ref 0–33)
ANION GAP SERPL CALC-SCNC: 10 MMOL/L (ref 8–17)
AST SERPL-CCNC: 14 U/L (ref 0–32)
BASOPHILS NFR BLD: 0.4 % (ref 0.2–1.2)
BILIRUBIN, TOTAL: 0.37 MG/DL (ref 0–1.2)
BUN/CREAT SERPL: 11.7 (ref 6–20)
CALCIUM SERPL-MCNC: 9.7 MG/DL (ref 8.6–10.2)
CARBON DIOXIDE, CO2: 29 MMOL/L (ref 22–29)
CHLORIDE: 100 MMOL/L (ref 98–107)
CHOLEST SERPL-MSCNC: 211 MG/DL (ref 100–200)
CREAT SERPL-MCNC: 0.76 MG/DL (ref 0.5–0.9)
EOSINOPHIL NFR BLD: 1.2 % (ref 0.7–7)
ESTIMATED AVERAGE GLUCOSE: 90 MG/DL
GFR ESTIMATION: 90.21
GLOBULIN: 1.7 G/DL (ref 1.5–4.5)
GLUCOSE: 95 MG/DL (ref 74–106)
HBA1C MFR BLD: 4.8 % (ref 4–6)
HCT VFR BLD AUTO: 34.8 % (ref 37–47)
HDLC SERPL-MCNC: 109 MG/DL
HGB BLD-MCNC: 11.4 G/DL (ref 12–16)
IMMATURE GRANULOCYTES: 0.2 % (ref 0–0.5)
LDL/HDL RATIO: 0.8 (ref 1–3)
LDLC SERPL CALC-MCNC: 87.4 MG/DL (ref 0–100)
LYMPHOCYTES NFR BLD: 29.6 % (ref 19.3–53.1)
MCH RBC QN AUTO: 27.4 PG (ref 27–32)
MCHC RBC AUTO-ENTMCNC: 32.8 G/DL (ref 32–36)
MCV RBC AUTO: 83.7 FL (ref 82–100)
MONOCYTES NFR BLD: 5.6 % (ref 4.7–12.5)
NEUTROPHILS # BLD AUTO: 3.24 X 10 3/UL (ref 2.15–7.56)
NEUTROPHILS NFR BLD: 63 % (ref 34–71.1)
NUCLEATED RED BLOOD CELLS: 0 /100 WBC (ref 0–0.2)
PLATELET # BLD AUTO: 238 X 10 3/UL (ref 135–400)
POTASSIUM: 4.2 MMOL/L (ref 3.5–5.1)
PROT SNV-MCNC: 6.5 G/DL (ref 6.4–8.3)
RBC # BLD AUTO: 4.16 X 10 6/UL (ref 4.2–5.4)
RDW-SD: 41.6 FL (ref 37–54)
SODIUM: 139 MMOL/L (ref 136–145)
TRIGL SERPL-MCNC: 73 MG/DL (ref 0–150)
TSH W/REFLEX TO FT4: 1.31 UIU/ML (ref 0.27–4.2)
UREA NITROGEN (BUN): 8.9 MG/DL (ref 6–20)
VITAMIN D (25OHD): 43.1 NG/ML
WBC # BLD: 5.14 X 10 3/UL (ref 4.3–10.8)

## 2023-03-28 ENCOUNTER — PATIENT MESSAGE (OUTPATIENT)
Dept: PRIMARY CARE CLINIC | Facility: CLINIC | Age: 58
End: 2023-03-28
Payer: COMMERCIAL

## 2023-03-28 DIAGNOSIS — D50.0 IRON DEFICIENCY ANEMIA DUE TO CHRONIC BLOOD LOSS: Primary | ICD-10-CM

## 2023-03-28 RX ORDER — FERROUS SULFATE 325(65) MG
325 TABLET ORAL
Qty: 90 TABLET | Refills: 0 | Status: SHIPPED | OUTPATIENT
Start: 2023-03-28 | End: 2023-07-03

## 2023-03-28 NOTE — PROGRESS NOTES
Lab show patient is slightly anemic, will treat with oral iron. Cholesterol elevated, will encourage low cholesterol diet. All other blood work is normal.

## 2023-03-31 ENCOUNTER — PATIENT MESSAGE (OUTPATIENT)
Dept: FAMILY MEDICINE | Facility: CLINIC | Age: 58
End: 2023-03-31
Payer: COMMERCIAL

## 2023-04-18 DIAGNOSIS — F90.2 ATTENTION DEFICIT HYPERACTIVITY DISORDER (ADHD), COMBINED TYPE: ICD-10-CM

## 2023-04-18 RX ORDER — LISDEXAMFETAMINE DIMESYLATE 50 MG/1
50 CAPSULE ORAL EVERY MORNING
Qty: 30 CAPSULE | Refills: 0 | Status: SHIPPED | OUTPATIENT
Start: 2023-04-18 | End: 2023-05-19 | Stop reason: SDUPTHER

## 2023-05-10 ENCOUNTER — PATIENT OUTREACH (OUTPATIENT)
Dept: ADMINISTRATIVE | Facility: HOSPITAL | Age: 58
End: 2023-05-10
Payer: COMMERCIAL

## 2023-05-19 DIAGNOSIS — F90.2 ATTENTION DEFICIT HYPERACTIVITY DISORDER (ADHD), COMBINED TYPE: ICD-10-CM

## 2023-05-19 RX ORDER — LISDEXAMFETAMINE DIMESYLATE 50 MG/1
50 CAPSULE ORAL EVERY MORNING
Qty: 30 CAPSULE | Refills: 0 | Status: SHIPPED | OUTPATIENT
Start: 2023-05-19 | End: 2023-06-26 | Stop reason: SDUPTHER

## 2023-06-26 DIAGNOSIS — F90.2 ATTENTION DEFICIT HYPERACTIVITY DISORDER (ADHD), COMBINED TYPE: ICD-10-CM

## 2023-06-27 RX ORDER — LISDEXAMFETAMINE DIMESYLATE 50 MG/1
50 CAPSULE ORAL EVERY MORNING
Qty: 30 CAPSULE | Refills: 0 | Status: SHIPPED | OUTPATIENT
Start: 2023-06-27 | End: 2023-07-24 | Stop reason: SDUPTHER

## 2023-07-02 DIAGNOSIS — D50.0 IRON DEFICIENCY ANEMIA DUE TO CHRONIC BLOOD LOSS: ICD-10-CM

## 2023-07-03 RX ORDER — FERROUS SULFATE 325(65) MG
TABLET ORAL
Qty: 90 TABLET | Refills: 0 | Status: SHIPPED | OUTPATIENT
Start: 2023-07-03 | End: 2024-01-22 | Stop reason: SDUPTHER

## 2023-07-20 ENCOUNTER — PATIENT MESSAGE (OUTPATIENT)
Dept: PRIMARY CARE CLINIC | Facility: CLINIC | Age: 58
End: 2023-07-20
Payer: COMMERCIAL

## 2023-07-24 DIAGNOSIS — F41.1 GENERALIZED ANXIETY DISORDER: ICD-10-CM

## 2023-07-24 DIAGNOSIS — F90.2 ATTENTION DEFICIT HYPERACTIVITY DISORDER (ADHD), COMBINED TYPE: ICD-10-CM

## 2023-07-25 RX ORDER — CLONAZEPAM 1 MG/1
1 TABLET ORAL 2 TIMES DAILY PRN
Qty: 60 TABLET | Refills: 2 | Status: SHIPPED | OUTPATIENT
Start: 2023-07-25 | End: 2023-11-21 | Stop reason: SDUPTHER

## 2023-07-25 RX ORDER — LISDEXAMFETAMINE DIMESYLATE 50 MG/1
50 CAPSULE ORAL EVERY MORNING
Qty: 30 CAPSULE | Refills: 0 | Status: SHIPPED | OUTPATIENT
Start: 2023-07-25 | End: 2023-08-24 | Stop reason: SDUPTHER

## 2023-08-07 LAB — BCS RECOMMENDATION EXT: NORMAL

## 2023-08-09 ENCOUNTER — PATIENT OUTREACH (OUTPATIENT)
Dept: ADMINISTRATIVE | Facility: HOSPITAL | Age: 58
End: 2023-08-09
Payer: COMMERCIAL

## 2023-08-24 DIAGNOSIS — F90.2 ATTENTION DEFICIT HYPERACTIVITY DISORDER (ADHD), COMBINED TYPE: ICD-10-CM

## 2023-08-24 RX ORDER — LISDEXAMFETAMINE DIMESYLATE 50 MG/1
50 CAPSULE ORAL EVERY MORNING
Qty: 30 CAPSULE | Refills: 0 | Status: SHIPPED | OUTPATIENT
Start: 2023-08-24 | End: 2023-09-23 | Stop reason: SDUPTHER

## 2023-08-29 ENCOUNTER — PATIENT MESSAGE (OUTPATIENT)
Dept: PRIMARY CARE CLINIC | Facility: CLINIC | Age: 58
End: 2023-08-29
Payer: COMMERCIAL

## 2023-09-23 DIAGNOSIS — F90.2 ATTENTION DEFICIT HYPERACTIVITY DISORDER (ADHD), COMBINED TYPE: ICD-10-CM

## 2023-09-25 RX ORDER — LISDEXAMFETAMINE DIMESYLATE 50 MG/1
50 CAPSULE ORAL EVERY MORNING
Qty: 30 CAPSULE | Refills: 0 | Status: SHIPPED | OUTPATIENT
Start: 2023-09-25 | End: 2023-10-16 | Stop reason: SDUPTHER

## 2023-10-08 LAB — NONINV COLON CA DNA+OCC BLD SCRN STL QL: NEGATIVE

## 2023-10-16 ENCOUNTER — OFFICE VISIT (OUTPATIENT)
Dept: PRIMARY CARE CLINIC | Facility: CLINIC | Age: 58
End: 2023-10-16
Payer: COMMERCIAL

## 2023-10-16 VITALS
HEIGHT: 61 IN | SYSTOLIC BLOOD PRESSURE: 100 MMHG | DIASTOLIC BLOOD PRESSURE: 69 MMHG | RESPIRATION RATE: 16 BRPM | BODY MASS INDEX: 21.14 KG/M2 | OXYGEN SATURATION: 98 % | HEART RATE: 81 BPM | WEIGHT: 112 LBS

## 2023-10-16 DIAGNOSIS — B37.31 VAGINAL YEAST INFECTION: ICD-10-CM

## 2023-10-16 DIAGNOSIS — F90.2 ATTENTION DEFICIT HYPERACTIVITY DISORDER (ADHD), COMBINED TYPE: ICD-10-CM

## 2023-10-16 DIAGNOSIS — F51.01 PRIMARY INSOMNIA: ICD-10-CM

## 2023-10-16 DIAGNOSIS — F41.1 GENERALIZED ANXIETY DISORDER: Primary | ICD-10-CM

## 2023-10-16 PROCEDURE — 3074F SYST BP LT 130 MM HG: CPT | Mod: CPTII,S$GLB,, | Performed by: NURSE PRACTITIONER

## 2023-10-16 PROCEDURE — 1160F RVW MEDS BY RX/DR IN RCRD: CPT | Mod: CPTII,S$GLB,, | Performed by: NURSE PRACTITIONER

## 2023-10-16 PROCEDURE — 99214 OFFICE O/P EST MOD 30 MIN: CPT | Mod: S$GLB,,, | Performed by: NURSE PRACTITIONER

## 2023-10-16 PROCEDURE — 3078F PR MOST RECENT DIASTOLIC BLOOD PRESSURE < 80 MM HG: ICD-10-PCS | Mod: CPTII,S$GLB,, | Performed by: NURSE PRACTITIONER

## 2023-10-16 PROCEDURE — 1159F PR MEDICATION LIST DOCUMENTED IN MEDICAL RECORD: ICD-10-PCS | Mod: CPTII,S$GLB,, | Performed by: NURSE PRACTITIONER

## 2023-10-16 PROCEDURE — 1160F PR REVIEW ALL MEDS BY PRESCRIBER/CLIN PHARMACIST DOCUMENTED: ICD-10-PCS | Mod: CPTII,S$GLB,, | Performed by: NURSE PRACTITIONER

## 2023-10-16 PROCEDURE — 3008F BODY MASS INDEX DOCD: CPT | Mod: CPTII,S$GLB,, | Performed by: NURSE PRACTITIONER

## 2023-10-16 PROCEDURE — 3008F PR BODY MASS INDEX (BMI) DOCUMENTED: ICD-10-PCS | Mod: CPTII,S$GLB,, | Performed by: NURSE PRACTITIONER

## 2023-10-16 PROCEDURE — 99214 PR OFFICE/OUTPT VISIT, EST, LEVL IV, 30-39 MIN: ICD-10-PCS | Mod: S$GLB,,, | Performed by: NURSE PRACTITIONER

## 2023-10-16 PROCEDURE — 3074F PR MOST RECENT SYSTOLIC BLOOD PRESSURE < 130 MM HG: ICD-10-PCS | Mod: CPTII,S$GLB,, | Performed by: NURSE PRACTITIONER

## 2023-10-16 PROCEDURE — 3044F PR MOST RECENT HEMOGLOBIN A1C LEVEL <7.0%: ICD-10-PCS | Mod: CPTII,S$GLB,, | Performed by: NURSE PRACTITIONER

## 2023-10-16 PROCEDURE — 1159F MED LIST DOCD IN RCRD: CPT | Mod: CPTII,S$GLB,, | Performed by: NURSE PRACTITIONER

## 2023-10-16 PROCEDURE — 3044F HG A1C LEVEL LT 7.0%: CPT | Mod: CPTII,S$GLB,, | Performed by: NURSE PRACTITIONER

## 2023-10-16 PROCEDURE — 3078F DIAST BP <80 MM HG: CPT | Mod: CPTII,S$GLB,, | Performed by: NURSE PRACTITIONER

## 2023-10-16 RX ORDER — LISDEXAMFETAMINE DIMESYLATE 50 MG/1
50 CAPSULE ORAL EVERY MORNING
Qty: 30 CAPSULE | Refills: 0 | Status: SHIPPED | OUTPATIENT
Start: 2023-10-25 | End: 2023-11-21 | Stop reason: SDUPTHER

## 2023-10-16 RX ORDER — FLUCONAZOLE 150 MG/1
150 TABLET ORAL DAILY
Qty: 2 TABLET | Refills: 0 | Status: SHIPPED | OUTPATIENT
Start: 2023-10-16 | End: 2023-10-18

## 2023-10-16 NOTE — PATIENT INSTRUCTIONS
RTC in 3 months for F/U or sooner if needed.    Patient Education       Attention Deficit Hyperactivity Disorder (ADHD) Discharge Instructions   About this topic   Attention deficit hyperactivity disorder is also called ADHD or ADD. Most often, this starts at a young age. This disorder makes it hard to focus or sit still. People with ADHD may find it hard to make good decisions. Children with ADHD may have trouble in school and at home. Adults may have problems at work. People with ADHD may also have a problem getting along well with others. While there is no cure for ADHD, you and your doctor can work on a plan that is best for you to treat the signs of ADHD.  What care is needed at home?   Ask your doctor what you need to do when you go home. Make sure you ask questions if you do not understand what the doctor says. This way you will know what you need to do.  Try to get enough sleep at night. Most often adults need 7 to 8 hours each night and children need 8 to 10 hours each night. Rest during the day if you are tired.  Eat and sleep at the same times every day.  Have a plan for where to keep things in your home. Use checklists, things to help you remember, and alarms. A list of things you may need to find in a hurry can be helpful. These can help you put things in the right place and manage your time.  Work on getting better at reading and taking notes.  Have a space that is just for work or homework so you will be able to pay attention. This may be an office or a desk facing a wall. Try using headphones so you cannot hear the other noises.  Do one thing at a time. Keep a list of the next things you were planning to do or say.  Break large jobs or things you have to do into smaller jobs. This will make them easier to finish. Reward yourself along the way.  Have rules that are clear and easy to follow.   Look at where you are the strongest. Give rewards for good behavior, finished schoolwork, or for doing a good  job at work.  Do not do too many things that might cause stress.  What follow-up care is needed?   Your doctor may ask you to make visits to the office to check on your progress. Be sure to keep these visits.  Your doctor may send you to a special mental health doctor. This person will talk with you about the problems you are having. Then, you can work together to find ways to help you manage them.  Your doctor may suggest you try talk therapy to help you live well with your ADHD.  What drugs may be needed?   The doctor may order drugs to:  Help lower your worry  Help you to pay attention  Help you be more calm  Help you be less impulsive  Help keep things in your life balanced  Care for low mood  Will physical activity be limited?   Physical activity will help keep your mind and body in good shape. Talk with your doctor about the right amount of activity for you.  What problems could happen?   Feeling badly about yourself  Raise the chances of you hurting yourself, such as injury in a car accident  Not do well in school and work  Trouble making friends or getting along well with others  Raise your chance to abuse alcohol or drugs  What can be done to prevent this health problem?   The cause of ADHD is not clear, but to lower the chance of your child having ADHD:  Do not drink beer, wine, or mixed drinks (alcohol) while you are pregnant.  Do not smoke or use illegal drugs while you are pregnant.  Keep your child away from things like harmful toxins and chemicals.  Keep your child from having too much time in front of computers, TV, and video games.  Get plenty of exercise.  Be more aware of thoughts, emotions, and experiences each moment. This is mindfulness.  When do I need to call the doctor?   Drugs are not working  Symptoms are getting worse  Teach Back: Helping You Understand   The Teach Back Method helps you understand the information we are giving you. After you talk with the staff, tell them in your own  words what you learned. This helps to make sure the staff has described each thing clearly. It also helps to explain things that may have been confusing. Before going home, make sure you can do these:  I can tell you about my condition.  I can tell you ways to help me pay attention.  I can tell you what I will do if I think my drugs are not working.  Where can I learn more?   HelpGuide[employer]org  http://www.helpguide.org/articles/add-adhd/attention-deficit-disorder-adhd-parenting-tips.htm   [employer]  http://kidshealth.org/parent/medical/learning/adhd.html   [place] [facility]  http://www.[facility].nih.gov/health/topics/attention-deficit-hyperactivity-disorder-adhd/index.shtml   Last Reviewed Date   2020-06-14  Consumer Information Use and Disclaimer   This information is not specific medical advice and does not replace information you receive from your health care provider. This is only a brief summary of general information. It does NOT include all information about conditions, illnesses, injuries, tests, procedures, treatments, therapies, discharge instructions or life-style choices that may apply to you. You must talk with your health care provider for complete information about your health and treatment options. This information should not be used to decide whether or not to accept your health care providers advice, instructions or recommendations. Only your health care provider has the knowledge and training to provide advice that is right for you.  Copyright   Copyright © 2021 [employer] and its affiliates and/or licensors. All rights reserved.  Patient Education       Anxiety Discharge Instructions, Adult   About this topic   Anxiety can cause you to feel very worried. It can also cause physical symptoms like chest pain, stomach aches, or trouble sleeping. While mild anxiety is a normal response to stress, it can cause you problems in your everyday life. You may need follow-up care to help manage  your anxiety. Anxiety happens in many forms, like:  Being scared all the time that something bad is going to happen. This is generalized anxiety.  Strong bursts of fear where your body has signs that may feel like a heart attack. This is called a panic attack.  Upsetting thoughts that happen often. There is a need to repeat doing certain things to help get rid of the anxiety caused by these thoughts. The thoughts or actions may be about checking on things, touching things, or worry about germs. This is an obsessive-compulsive disorder.  Strong fear of an object, place, or condition. This is a phobia.  Fear that others think bad things about you or being put down by other people. This is social anxiety.  Nightmares, flashbacks, staying away from people, or having panic attacks when reminded of a shocking or hurtful time or place from the past. This is post-traumatic stress.  Anxiety disorder may be treated in many ways. Some kinds of treatment have you talk about your beliefs, fears, and worries. You may learn how certain thoughts or feelings can raise anxiety. You may also learn what steps to take to lower anxiety. Other kinds of treatment may have you look back on a hurtful event, sad memory, or something you are afraid of. The doctor will help you deal with the feelings that you may have. You may learn ways to cope with unwanted events or thoughts by looking at your fears in a way that feels safe.  What care is needed at home?   Ask your doctor what you need to do when you go home. Make sure you ask questions if you do not understand what the doctor says.  Set a time to talk with a counselor about your worries and feelings. This can help you with your anxiety.  Take care to follow all instructions when you take your medicines.  Limit alcohol and caffeine.  Learn ways to manage stress. Relaxation methods like reflection, deep breathing, and muscle relaxation may be helpful. Things like yoga, exercise, and mike chi  are also good.  Talk about your feelings with family members and friends you trust. Talk to someone who can help you see how your thoughts at certain times may raise your anxiety.     What follow-up care is needed?   Your doctor may ask you to make visits to the office to check on your progress. Be sure to keep these visits.  What drugs may be needed?   The doctor may order drugs to help the physical signs of anxiety. Make sure that you take the drugs as taught to you by the doctor. Talk with your doctor about any side effects and ask how long you should take the drug.  Will physical activity be limited?   You may take part in physical activities. Some people are limited because of their anxiety or fear. Talk with your doctor about the right amount of activity for you.  What changes to diet are needed?   Eat a variety of healthy foods and limit drinks with caffeine. You should avoid alcohol, energy drinks, and over-the-counter stimulants.  What problems could happen?   If your anxiety is not treated, it can result in:  Staying away from work or social events  Not being able to do everyday tasks  Keeping away from family and friends  What can be done to prevent this health problem?   Learn what events, people, or things upset you. Limit your contact with them.  Talk about your feelings. Talk to someone who can help you see how your thoughts at certain times may raise your anxiety.  Seek support from your friends and family. Find someone who calms you down. Ask if you can call them when you are getting anxious.  When do I need to call the doctor?   You feel you may harm yourself or someone else.  You can also call a mental health hotline for help.  You have any physical symptoms, such as chest pain, trouble breathing, or severe belly pain, that could be a sign of a serious problem.  If you are short of breath.  If you do not feel like you can be alone.  Teach Back: Helping You Understand   The Teach Back Method helps  you understand the information we are giving you. After you talk with the staff, tell them in your own words what you learned. This helps to make sure the staff has described each thing clearly. It also helps to explain things that may have been confusing. Before going home, make sure you can do these:  I can tell you about my condition and the drugs I need to take.  I can tell you what may help lower my anxiety.  I can tell you what I will do if it is hard to breathe or I have chest pain.  I can tell you what I will do if I do not feel safe or cannot be alone.  Where can I learn more?   [employer]  https://www.[facility].org/Learn-More/Mental-Health-Conditions/Anxiety-Disorders   [facility]  https://www.nhs.uk/conditions/generalised-anxiety-disorder/symptoms/   [employer] ? [facility]  https://www.[facility]ha.nih.gov/health/relieving-stress-anxiety-xxyweydqq-bxfjbgysof-mkcftiasgo   [employer] of [employer]  http://www.nimh.nih.gov/health/publications/anxiety-disorders/complete-index.shtml   Last Reviewed Date   2021-06-08  Consumer Information Use and Disclaimer   This information is not specific medical advice and does not replace information you receive from your health care provider. This is only a brief summary of general information. It does NOT include all information about conditions, illnesses, injuries, tests, procedures, treatments, therapies, discharge instructions or life-style choices that may apply to you. You must talk with your health care provider for complete information about your health and treatment options. This information should not be used to decide whether or not to accept your health care providers advice, instructions or recommendations. Only your health care provider has the knowledge and training to provide advice that is right for you.  Copyright   Copyright © 2021 [employer] and its affiliates and/or licensors. All rights reserved.

## 2023-10-16 NOTE — PROGRESS NOTES
Subjective:       Patient ID: Isabella Santana is a 57 y.o. female.    Chief Complaint: Follow-up    58 y/o female presents for F/U.    Patient feels well today only issue issue is she has some vaginal discomfort and a little vaginal discharge for the past 3 days.    Anxiety - chronic, controlled well on clonazepam as needed. Denies se/ar to medication. Denies being depressed. Denies SI/HI.    ADHD - controlled with Vyvanse 50 mg daily. No s/e of CP, palpitations, syncope, Denies se/ar to medication.  checked and is appropriate.    History of a total hysterectomy in 2012 and she takes Estradiol daily which works well.     Mammogram done in August and was negative.    Cologuard done in September and was negative.    UTD with COVID and FLU vaccines.                    Past Medical History:   Diagnosis Date    ADHD (attention deficit hyperactivity disorder)     Anxiety     Hormone deficiency     Insomnia        Past Surgical History:   Procedure Laterality Date    BREAST BIOPSY Left     HYSTERECTOMY      ROTATOR CUFF REPAIR Left 01/2023    WISDOM TOOTH EXTRACTION         Family History   Problem Relation Age of Onset    Diabetes Mother     Cancer Father     Heart disease Father     Cancer Sister        Social History     Tobacco Use    Smoking status: Former     Types: Vaping with nicotine    Smokeless tobacco: Never   Substance Use Topics    Alcohol use: Yes    Drug use: Never       Patient Active Problem List   Diagnosis    Attention deficit hyperactivity disorder    Breast lump    Primary insomnia    Menopausal disorder    Generalized anxiety disorder    Superior glenoid labrum lesion of left shoulder    Complete rotator cuff tear or rupture of left shoulder, not specified as traumatic    Osteoarthritis of left AC (acromioclavicular) joint    Tendinopathy of left biceps tendon    Greater tuberosity of humerus fracture    Traumatic complete tear of left rotator cuff       Immunization History   Administered Date(s)  "Administered    COVID-19, MRNA, LN-S, PF (Pfizer) (Purple Cap) 07/27/2021, 08/17/2021    Influenza - Quadrivalent - PF *Preferred* (6 months and older) 10/05/2020, 09/23/2021, 10/24/2022, 10/06/2023           Review of Systems   Constitutional:  Negative for activity change, appetite change, chills, diaphoresis, fatigue and fever.   HENT:  Negative for congestion, ear pain, sinus pain, tinnitus and trouble swallowing.    Eyes:  Negative for visual disturbance.   Respiratory:  Negative for cough, chest tightness, shortness of breath and wheezing.    Cardiovascular:  Negative for chest pain, palpitations and leg swelling.   Gastrointestinal:  Negative for abdominal distention and abdominal pain.   Genitourinary:  Positive for vaginal discharge. Negative for decreased urine volume, dysuria, frequency, hematuria, urgency, vaginal bleeding and vaginal pain.   Musculoskeletal:  Negative for gait problem.   Skin:  Negative for color change and rash.   Neurological:  Negative for dizziness, syncope, weakness, light-headedness, numbness and headaches.   Psychiatric/Behavioral:  Negative for behavioral problems, confusion, dysphoric mood and suicidal ideas. The patient is not nervous/anxious.      Objective:     Vitals:    10/16/23 1413   BP: 100/69   BP Location: Left arm   Patient Position: Sitting   BP Method: Medium (Automatic)   Pulse: 81   Resp: 16   SpO2: 98%   Weight: 50.8 kg (112 lb)   Height: 5' 1" (1.549 m)       Physical Exam  Vitals and nursing note reviewed.   Constitutional:       General: She is not in acute distress.     Appearance: Normal appearance. She is not diaphoretic.   HENT:      Head: Normocephalic and atraumatic.      Mouth/Throat:      Mouth: Mucous membranes are moist.      Pharynx: Oropharynx is clear.   Eyes:      Extraocular Movements: Extraocular movements intact.      Conjunctiva/sclera: Conjunctivae normal.   Cardiovascular:      Rate and Rhythm: Normal rate and regular rhythm.   Pulmonary: "      Effort: Pulmonary effort is normal.      Breath sounds: Normal breath sounds. No stridor. No wheezing or rales.   Abdominal:      General: There is no distension.      Tenderness: There is no abdominal tenderness.   Musculoskeletal:         General: No tenderness. Normal range of motion.      Cervical back: Normal range of motion.      Right lower leg: No edema.      Left lower leg: No edema.   Lymphadenopathy:      Cervical: No cervical adenopathy.   Skin:     General: Skin is warm and dry.      Capillary Refill: Capillary refill takes less than 2 seconds.   Neurological:      Mental Status: She is alert and oriented to person, place, and time.   Psychiatric:         Mood and Affect: Mood and affect normal.         Behavior: Behavior normal. Behavior is cooperative.         Thought Content: Thought content normal.         Judgment: Judgment normal.         No visits with results within 6 Month(s) from this visit.   Latest known visit with results is:   Office Visit on 03/22/2023   Component Date Value Ref Range Status    BCS Recommendation External 08/07/2023 Repeat mammogram in 1 year   Final    Cologuard Result 09/30/2023 Negative  Negative Final    WBC 03/22/2023 5.14  4.3 - 10.8 X 10 3/ul Final    RBC 03/22/2023 4.16 (L)  4.2 - 5.4 X 10 6/ul Final    RDW-SD 03/22/2023 41.6  37 - 54 fl Final    Hemoglobin 03/22/2023 11.4 (L)  12 - 16 g/dL Final    Hematocrit 03/22/2023 34.8 (L)  37 - 47 % Final    MCV 03/22/2023 83.7  82 - 100 fl Final    MCH 03/22/2023 27.4  27 - 32 pg Final    MCHC 03/22/2023 32.8  32 - 36 g/dL Final    Platelets 03/22/2023 238  135 - 400 X 10 3/ul Final    Neutrophils 03/22/2023 63.0  34 - 71.1 % Final    Lymphocytes 03/22/2023 29.6  19.3 - 53.1 % Final    Monocytes 03/22/2023 5.6  4.7 - 12.5 % Final    Eosinophils 03/22/2023 1.2  0.7 - 7.0 % Final    Basophils 03/22/2023 0.4  0.2 - 1.2 % Final    Neutrophils Absolute 03/22/2023 3.24  2.15 - 7.56 X 10 3/ul Final    Lymphocytes Absolute  03/22/2023 1.52  0.86 - 4.75 X 10 3/ul Final    Monocytes Absolute 03/22/2023 0.29  0.22 - 1.08 X 10 3/ul Final    Eosinophils Absolute 03/22/2023 0.06  0.04 - 0.54 X 10 3/ul Final    Basophils Absolute 03/22/2023 0.02  0.00 - 0.22 X 10 3/ul Final    Immature Granulocytes Absolute 03/22/2023 0.01  0 - 0.04 X 10 3/ul Final    Immature Granulocytes 03/22/2023 0.2  0 - 0.5 % Final    nRBC# 03/22/2023 0.0  0 - 0.2 /100 WBC Final    nRBC Count Absolute 03/22/2023 0.000  0 - 0.012 x 10 3/ul Final    Glucose 03/22/2023 95  74 - 106 mg/dL Final    BUN 03/22/2023 8.9  6 - 20 mg/dL Final    Creatinine 03/22/2023 0.76  0.50 - 0.90 mg/dL Final    AST 03/22/2023 14  0 - 32 U/L Final    ALT (SGPT) 03/22/2023 12  0 - 33 U/L Final    Alkaline Phosphatase 03/22/2023 59  35 - 105 U/L Final    Calcium 03/22/2023 9.7  8.6 - 10.2 mg/dL Final    Protein, Total 03/22/2023 6.5  6.4 - 8.3 g/dL Final    Albumin 03/22/2023 4.8  3.5 - 5.2 g/dL Final    BILIRUBIN, TOTAL 03/22/2023 0.37  0.00 - 1.20 mg/dL Final    Sodium 03/22/2023 139  136 - 145 mmol/L Final    Potassium 03/22/2023 4.2  3.5 - 5.1 mmol/L Final    Chloride 03/22/2023 100  98 - 107 mmol/L Final    CO2 03/22/2023 29  22 - 29 mmol/L Final    Globulin 03/22/2023 1.7  1.5 - 4.5 g/dL Final    Albumin/Globulin Ratio 03/22/2023 2.8 (H)  1.0 - 2.7 Final    BUN/Creatinine Ratio 03/22/2023 11.7  6 - 20 Final    GFR ESTIMATION 03/22/2023 90.21  >60.00 Final    Anion Gap 03/22/2023 10.0  8.0 - 17.0 mmol/L Final    Hemoglobin A1C 03/22/2023 4.8  4.0 - 6.0 % Final    EST AVERAGE GLUCOSE 03/22/2023 90  NORMAL MG/DL Final    Cholesterol 03/22/2023 211 (H)  100 - 200 mg/dL Final    Triglycerides 03/22/2023 73  0 - 150 mg/dL Final    HDL 03/22/2023 109  >60 mg/dL Final    LDL Cholesterol 03/22/2023 87.4  0 - 100 mg/dL Final    LDL/HDL Ratio 03/22/2023 0.8 (L)  1 - 3 Final    VITAMIN D (25OHD) 03/22/2023 43.1  >30 ng/mL Final    TSH w/reflex to FT4 03/22/2023 1.31  0.27 - 4.20 uIU/mL Final          Assessment:      1. Generalized anxiety disorder    2. Primary insomnia    3. Attention deficit hyperactivity disorder (ADHD), combined type Well controlled   4. Vaginal yeast infection          Plan:     Generalized anxiety disorder    Primary insomnia    Attention deficit hyperactivity disorder (ADHD), combined type  Comments:  Vyvanse refilled, F/U in 3 months.  Orders:  -     lisdexamfetamine (VYVANSE) 50 MG capsule; Take 1 capsule (50 mg total) by mouth every morning.  Dispense: 30 capsule; Refill: 0    Vaginal yeast infection  -     fluconazole (DIFLUCAN) 150 MG Tab; Take 1 tablet (150 mg total) by mouth once daily. for 2 days  Dispense: 2 tablet; Refill: 0         Current Outpatient Medications   Medication Sig Dispense Refill    clonazePAM (KLONOPIN) 1 MG tablet Take 1 tablet (1 mg total) by mouth 2 (two) times daily as needed for Anxiety. 60 tablet 2    estradioL (ESTRACE) 1 MG tablet Take 1 tablet (1 mg total) by mouth once daily. 90 tablet 3    ferrous sulfate (FEOSOL) 325 mg (65 mg iron) Tab tablet TAKE 1 TABLET BY MOUTH EVERY DAY WITH BREAKFAST 90 tablet 0    [START ON 10/25/2023] lisdexamfetamine (VYVANSE) 50 MG capsule Take 1 capsule (50 mg total) by mouth every morning. 30 capsule 0     No current facility-administered medications for this visit.       Medications Discontinued During This Encounter   Medication Reason    lisdexamfetamine (VYVANSE) 50 MG capsule Reorder       Health Maintenance   Topic Date Due    Hepatitis C Screening  Never done    TETANUS VACCINE  Never done    Shingles Vaccine (1 of 2) Never done    Mammogram  08/07/2024    Lipid Panel  03/22/2028    Colorectal Cancer Screening  Discontinued       Patient Instructions   RTC in 3 months for F/U or sooner if needed.    Patient Education       Attention Deficit Hyperactivity Disorder (ADHD) Discharge Instructions   About this topic   Attention deficit hyperactivity disorder is also called ADHD or ADD. Most often, this starts  at a young age. This disorder makes it hard to focus or sit still. People with ADHD may find it hard to make good decisions. Children with ADHD may have trouble in school and at home. Adults may have problems at work. People with ADHD may also have a problem getting along well with others. While there is no cure for ADHD, you and your doctor can work on a plan that is best for you to treat the signs of ADHD.  What care is needed at home?   Ask your doctor what you need to do when you go home. Make sure you ask questions if you do not understand what the doctor says. This way you will know what you need to do.  Try to get enough sleep at night. Most often adults need 7 to 8 hours each night and children need 8 to 10 hours each night. Rest during the day if you are tired.  Eat and sleep at the same times every day.  Have a plan for where to keep things in your home. Use checklists, things to help you remember, and alarms. A list of things you may need to find in a hurry can be helpful. These can help you put things in the right place and manage your time.  Work on getting better at reading and taking notes.  Have a space that is just for work or homework so you will be able to pay attention. This may be an office or a desk facing a wall. Try using headphones so you cannot hear the other noises.  Do one thing at a time. Keep a list of the next things you were planning to do or say.  Break large jobs or things you have to do into smaller jobs. This will make them easier to finish. Reward yourself along the way.  Have rules that are clear and easy to follow.   Look at where you are the strongest. Give rewards for good behavior, finished schoolwork, or for doing a good job at work.  Do not do too many things that might cause stress.  What follow-up care is needed?   Your doctor may ask you to make visits to the office to check on your progress. Be sure to keep these visits.  Your doctor may send you to a special mental  health doctor. This person will talk with you about the problems you are having. Then, you can work together to find ways to help you manage them.  Your doctor may suggest you try talk therapy to help you live well with your ADHD.  What drugs may be needed?   The doctor may order drugs to:  Help lower your worry  Help you to pay attention  Help you be more calm  Help you be less impulsive  Help keep things in your life balanced  Care for low mood  Will physical activity be limited?   Physical activity will help keep your mind and body in good shape. Talk with your doctor about the right amount of activity for you.  What problems could happen?   Feeling badly about yourself  Raise the chances of you hurting yourself, such as injury in a car accident  Not do well in school and work  Trouble making friends or getting along well with others  Raise your chance to abuse alcohol or drugs  What can be done to prevent this health problem?   The cause of ADHD is not clear, but to lower the chance of your child having ADHD:  Do not drink beer, wine, or mixed drinks (alcohol) while you are pregnant.  Do not smoke or use illegal drugs while you are pregnant.  Keep your child away from things like harmful toxins and chemicals.  Keep your child from having too much time in front of computers, TV, and video games.  Get plenty of exercise.  Be more aware of thoughts, emotions, and experiences each moment. This is mindfulness.  When do I need to call the doctor?   Drugs are not working  Symptoms are getting worse  Teach Back: Helping You Understand   The Teach Back Method helps you understand the information we are giving you. After you talk with the staff, tell them in your own words what you learned. This helps to make sure the staff has described each thing clearly. It also helps to explain things that may have been confusing. Before going home, make sure you can do these:  I can tell you about my condition.  I can tell you ways  to help me pay attention.  I can tell you what I will do if I think my drugs are not working.  Where can I learn more?   HelpGuide.org  http://www.helpguide.org/articles/add-adhd/attention-deficit-disorder-adhd-parenting-tips.htm   KidsHealth  http://kidshealth.org/parent/medical/learning/adhd.html   [place] [employer]  http://www.[facility].nih.gov/health/topics/attention-deficit-hyperactivity-disorder-adhd/index.shtml   Last Reviewed Date   2020-06-14  Consumer Information Use and Disclaimer   This information is not specific medical advice and does not replace information you receive from your health care provider. This is only a brief summary of general information. It does NOT include all information about conditions, illnesses, injuries, tests, procedures, treatments, therapies, discharge instructions or life-style choices that may apply to you. You must talk with your health care provider for complete information about your health and treatment options. This information should not be used to decide whether or not to accept your health care providers advice, instructions or recommendations. Only your health care provider has the knowledge and training to provide advice that is right for you.  Copyright   Copyright © 2021 UpToDate, Inc. and its affiliates and/or licensors. All rights reserved.  Patient Education       Anxiety Discharge Instructions, Adult   About this topic   Anxiety can cause you to feel very worried. It can also cause physical symptoms like chest pain, stomach aches, or trouble sleeping. While mild anxiety is a normal response to stress, it can cause you problems in your everyday life. You may need follow-up care to help manage your anxiety. Anxiety happens in many forms, like:  Being scared all the time that something bad is going to happen. This is generalized anxiety.  Strong bursts of fear where your body has signs that may feel like a heart attack. This is called a panic  attack.  Upsetting thoughts that happen often. There is a need to repeat doing certain things to help get rid of the anxiety caused by these thoughts. The thoughts or actions may be about checking on things, touching things, or worry about germs. This is an obsessive-compulsive disorder.  Strong fear of an object, place, or condition. This is a phobia.  Fear that others think bad things about you or being put down by other people. This is social anxiety.  Nightmares, flashbacks, staying away from people, or having panic attacks when reminded of a shocking or hurtful time or place from the past. This is post-traumatic stress.  Anxiety disorder may be treated in many ways. Some kinds of treatment have you talk about your beliefs, fears, and worries. You may learn how certain thoughts or feelings can raise anxiety. You may also learn what steps to take to lower anxiety. Other kinds of treatment may have you look back on a hurtful event, sad memory, or something you are afraid of. The doctor will help you deal with the feelings that you may have. You may learn ways to cope with unwanted events or thoughts by looking at your fears in a way that feels safe.  What care is needed at home?   Ask your doctor what you need to do when you go home. Make sure you ask questions if you do not understand what the doctor says.  Set a time to talk with a counselor about your worries and feelings. This can help you with your anxiety.  Take care to follow all instructions when you take your medicines.  Limit alcohol and caffeine.  Learn ways to manage stress. Relaxation methods like reflection, deep breathing, and muscle relaxation may be helpful. Things like yoga, exercise, and mike chi are also good.  Talk about your feelings with family members and friends you trust. Talk to someone who can help you see how your thoughts at certain times may raise your anxiety.     What follow-up care is needed?   Your doctor may ask you to make  visits to the office to check on your progress. Be sure to keep these visits.  What drugs may be needed?   The doctor may order drugs to help the physical signs of anxiety. Make sure that you take the drugs as taught to you by the doctor. Talk with your doctor about any side effects and ask how long you should take the drug.  Will physical activity be limited?   You may take part in physical activities. Some people are limited because of their anxiety or fear. Talk with your doctor about the right amount of activity for you.  What changes to diet are needed?   Eat a variety of healthy foods and limit drinks with caffeine. You should avoid alcohol, energy drinks, and over-the-counter stimulants.  What problems could happen?   If your anxiety is not treated, it can result in:  Staying away from work or social events  Not being able to do everyday tasks  Keeping away from family and friends  What can be done to prevent this health problem?   Learn what events, people, or things upset you. Limit your contact with them.  Talk about your feelings. Talk to someone who can help you see how your thoughts at certain times may raise your anxiety.  Seek support from your friends and family. Find someone who calms you down. Ask if you can call them when you are getting anxious.  When do I need to call the doctor?   You feel you may harm yourself or someone else.  You can also call a mental health hotline for help.  You have any physical symptoms, such as chest pain, trouble breathing, or severe belly pain, that could be a sign of a serious problem.  If you are short of breath.  If you do not feel like you can be alone.  Teach Back: Helping You Understand   The Teach Back Method helps you understand the information we are giving you. After you talk with the staff, tell them in your own words what you learned. This helps to make sure the staff has described each thing clearly. It also helps to explain things that may have been  confusing. Before going home, make sure you can do these:  I can tell you about my condition and the drugs I need to take.  I can tell you what may help lower my anxiety.  I can tell you what I will do if it is hard to breathe or I have chest pain.  I can tell you what I will do if I do not feel safe or cannot be alone.  Where can I learn more?   [employer]  https://www.gricelda.org/Learn-More/Mental-Health-Conditions/Anxiety-Disorders   National Health Service  https://www.nhs.uk/conditions/generalised-anxiety-disorder/symptoms/   [employer] [place] [employer] ? [facility]  https://www.[facility].nih.gov/health/relieving-stress-anxiety-tjssieebt-nowpgjreby-yjujpbeixr   [employer] [place] [employer]  http://www.[facility].nih.gov/health/publications/anxiety-disorders/complete-index.shtml   Last Reviewed Date   2021-06-08  Consumer Information Use and Disclaimer   This information is not specific medical advice and does not replace information you receive from your health care provider. This is only a brief summary of general information. It does NOT include all information about conditions, illnesses, injuries, tests, procedures, treatments, therapies, discharge instructions or life-style choices that may apply to you. You must talk with your health care provider for complete information about your health and treatment options. This information should not be used to decide whether or not to accept your health care providers advice, instructions or recommendations. Only your health care provider has the knowledge and training to provide advice that is right for you.  Copyright   Copyright © 2021 UpToDate, Inc. and its affiliates and/or licensors. All rights reserved.      Risks, benefits, and alternatives discussed with patient, Patient verbalized understanding of discussed plan of care. Asked patient if any further questions, answered no.    Future Appointments   Date Time Provider Department Center   1/22/2024  2:40 PM Doug  Stephanie GRANGER NP [facility] PRICG5 CAITLIN Camacho NP

## 2023-11-21 DIAGNOSIS — F41.1 GENERALIZED ANXIETY DISORDER: ICD-10-CM

## 2023-11-21 DIAGNOSIS — F90.2 ATTENTION DEFICIT HYPERACTIVITY DISORDER (ADHD), COMBINED TYPE: ICD-10-CM

## 2023-11-21 RX ORDER — CLONAZEPAM 1 MG/1
1 TABLET ORAL 2 TIMES DAILY PRN
Qty: 60 TABLET | Refills: 2 | Status: SHIPPED | OUTPATIENT
Start: 2023-11-21 | End: 2024-01-22 | Stop reason: SDUPTHER

## 2023-11-21 RX ORDER — LISDEXAMFETAMINE DIMESYLATE 50 MG/1
50 CAPSULE ORAL EVERY MORNING
Qty: 30 CAPSULE | Refills: 0 | Status: SHIPPED | OUTPATIENT
Start: 2023-11-21 | End: 2023-12-22 | Stop reason: SDUPTHER

## 2023-12-22 DIAGNOSIS — F90.2 ATTENTION DEFICIT HYPERACTIVITY DISORDER (ADHD), COMBINED TYPE: ICD-10-CM

## 2023-12-26 RX ORDER — LISDEXAMFETAMINE DIMESYLATE 50 MG/1
50 CAPSULE ORAL EVERY MORNING
Qty: 30 CAPSULE | Refills: 0 | Status: SHIPPED | OUTPATIENT
Start: 2023-12-26 | End: 2024-01-22

## 2023-12-28 ENCOUNTER — TELEPHONE (OUTPATIENT)
Dept: PRIMARY CARE CLINIC | Facility: CLINIC | Age: 58
End: 2023-12-28
Payer: COMMERCIAL

## 2023-12-28 ENCOUNTER — PATIENT MESSAGE (OUTPATIENT)
Dept: PRIMARY CARE CLINIC | Facility: CLINIC | Age: 58
End: 2023-12-28
Payer: COMMERCIAL

## 2023-12-28 DIAGNOSIS — F90.2 ATTENTION DEFICIT HYPERACTIVITY DISORDER (ADHD), COMBINED TYPE: Primary | ICD-10-CM

## 2023-12-28 RX ORDER — LISDEXAMFETAMINE DIMESYLATE 40 MG/1
40 CAPSULE ORAL DAILY
Qty: 30 CAPSULE | Refills: 0 | Status: SHIPPED | OUTPATIENT
Start: 2023-12-28 | End: 2024-01-22 | Stop reason: SDUPTHER

## 2023-12-28 NOTE — TELEPHONE ENCOUNTER
----- Message from Key Abbott MA sent at 12/28/2023  2:30 PM CST -----  Contact: Patient  Pt would like to be prescribed vyvanse 40 mg instead and would like it sent to [facility] in [place].  ----- Message -----  From: Mildred Eaton  Sent: 12/28/2023   2:27 PM CST  To: Doug SALVADOR Staff    Patient called to consult with nurse or staff regarding her medication lisdexamfetamine. She states the medication is in stock at [facility], [address] [place] [id number], [phone]. She states they have this medication in the 40 mg and would like this prescription filled. She would like a call back once this medication is called in and can be reached at [phone]. Patient states a message through her portal is fine as well. Thanks/MR

## 2024-01-22 ENCOUNTER — OFFICE VISIT (OUTPATIENT)
Dept: PRIMARY CARE CLINIC | Facility: CLINIC | Age: 59
End: 2024-01-22
Payer: COMMERCIAL

## 2024-01-22 VITALS
HEIGHT: 61 IN | WEIGHT: 113 LBS | BODY MASS INDEX: 21.34 KG/M2 | HEART RATE: 88 BPM | SYSTOLIC BLOOD PRESSURE: 102 MMHG | DIASTOLIC BLOOD PRESSURE: 70 MMHG | OXYGEN SATURATION: 99 %

## 2024-01-22 DIAGNOSIS — R35.0 URINE FREQUENCY: ICD-10-CM

## 2024-01-22 DIAGNOSIS — E55.9 VITAMIN D DEFICIENCY: ICD-10-CM

## 2024-01-22 DIAGNOSIS — Z13.220 SCREENING CHOLESTEROL LEVEL: ICD-10-CM

## 2024-01-22 DIAGNOSIS — Z00.00 ANNUAL PHYSICAL EXAM: ICD-10-CM

## 2024-01-22 DIAGNOSIS — F41.1 GENERALIZED ANXIETY DISORDER: ICD-10-CM

## 2024-01-22 DIAGNOSIS — F51.01 PRIMARY INSOMNIA: ICD-10-CM

## 2024-01-22 DIAGNOSIS — F90.2 ATTENTION DEFICIT HYPERACTIVITY DISORDER (ADHD), COMBINED TYPE: Primary | ICD-10-CM

## 2024-01-22 DIAGNOSIS — Z13.1 DIABETES MELLITUS SCREENING: ICD-10-CM

## 2024-01-22 DIAGNOSIS — D50.0 IRON DEFICIENCY ANEMIA DUE TO CHRONIC BLOOD LOSS: ICD-10-CM

## 2024-01-22 DIAGNOSIS — Z13.29 THYROID DISORDER SCREEN: ICD-10-CM

## 2024-01-22 PROCEDURE — 1160F RVW MEDS BY RX/DR IN RCRD: CPT | Mod: CPTII,S$GLB,, | Performed by: NURSE PRACTITIONER

## 2024-01-22 PROCEDURE — 3074F SYST BP LT 130 MM HG: CPT | Mod: CPTII,S$GLB,, | Performed by: NURSE PRACTITIONER

## 2024-01-22 PROCEDURE — 3008F BODY MASS INDEX DOCD: CPT | Mod: CPTII,S$GLB,, | Performed by: NURSE PRACTITIONER

## 2024-01-22 PROCEDURE — 1159F MED LIST DOCD IN RCRD: CPT | Mod: CPTII,S$GLB,, | Performed by: NURSE PRACTITIONER

## 2024-01-22 PROCEDURE — 3078F DIAST BP <80 MM HG: CPT | Mod: CPTII,S$GLB,, | Performed by: NURSE PRACTITIONER

## 2024-01-22 PROCEDURE — 99214 OFFICE O/P EST MOD 30 MIN: CPT | Mod: S$GLB,,, | Performed by: NURSE PRACTITIONER

## 2024-01-22 RX ORDER — CLONAZEPAM 1 MG/1
1 TABLET ORAL 2 TIMES DAILY PRN
Qty: 60 TABLET | Refills: 2 | Status: SHIPPED | OUTPATIENT
Start: 2024-01-22 | End: 2024-04-23 | Stop reason: SDUPTHER

## 2024-01-22 RX ORDER — FERROUS SULFATE 325(65) MG
TABLET ORAL
Qty: 90 TABLET | Refills: 0 | Status: SHIPPED | OUTPATIENT
Start: 2024-01-22 | End: 2024-04-29

## 2024-01-22 RX ORDER — LISDEXAMFETAMINE DIMESYLATE 40 MG/1
40 CAPSULE ORAL DAILY
Qty: 30 CAPSULE | Refills: 0 | Status: SHIPPED | OUTPATIENT
Start: 2024-01-22 | End: 2024-04-23

## 2024-01-22 NOTE — PATIENT INSTRUCTIONS
RTC in 3 months for F/U or sooner if needed.    Patient Education       Anxiety Discharge Instructions, Adult   About this topic   Anxiety can cause you to feel very worried. It can also cause physical symptoms like chest pain, stomach aches, or trouble sleeping. While mild anxiety is a normal response to stress, it can cause you problems in your everyday life. You may need follow-up care to help manage your anxiety. Anxiety happens in many forms, like:  Being scared all the time that something bad is going to happen. This is generalized anxiety.  Strong bursts of fear where your body has signs that may feel like a heart attack. This is called a panic attack.  Upsetting thoughts that happen often. There is a need to repeat doing certain things to help get rid of the anxiety caused by these thoughts. The thoughts or actions may be about checking on things, touching things, or worry about germs. This is an obsessive-compulsive disorder.  Strong fear of an object, place, or condition. This is a phobia.  Fear that others think bad things about you or being put down by other people. This is social anxiety.  Nightmares, flashbacks, staying away from people, or having panic attacks when reminded of a shocking or hurtful time or place from the past. This is post-traumatic stress.  Anxiety disorder may be treated in many ways. Some kinds of treatment have you talk about your beliefs, fears, and worries. You may learn how certain thoughts or feelings can raise anxiety. You may also learn what steps to take to lower anxiety. Other kinds of treatment may have you look back on a hurtful event, sad memory, or something you are afraid of. The doctor will help you deal with the feelings that you may have. You may learn ways to cope with unwanted events or thoughts by looking at your fears in a way that feels safe.  What care is needed at home?   Ask your doctor what you need to do when you go home. Make sure you ask questions if  you do not understand what the doctor says.  Set a time to talk with a counselor about your worries and feelings. This can help you with your anxiety.  Take care to follow all instructions when you take your medicines.  Limit alcohol and caffeine.  Learn ways to manage stress. Relaxation methods like reflection, deep breathing, and muscle relaxation may be helpful. Things like yoga, exercise, and mike chi are also good.  Talk about your feelings with family members and friends you trust. Talk to someone who can help you see how your thoughts at certain times may raise your anxiety.     What follow-up care is needed?   Your doctor may ask you to make visits to the office to check on your progress. Be sure to keep these visits.  What drugs may be needed?   The doctor may order drugs to help the physical signs of anxiety. Make sure that you take the drugs as taught to you by the doctor. Talk with your doctor about any side effects and ask how long you should take the drug.  Will physical activity be limited?   You may take part in physical activities. Some people are limited because of their anxiety or fear. Talk with your doctor about the right amount of activity for you.  What changes to diet are needed?   Eat a variety of healthy foods and limit drinks with caffeine. You should avoid alcohol, energy drinks, and over-the-counter stimulants.  What problems could happen?   If your anxiety is not treated, it can result in:  Staying away from work or social events  Not being able to do everyday tasks  Keeping away from family and friends  What can be done to prevent this health problem?   Learn what events, people, or things upset you. Limit your contact with them.  Talk about your feelings. Talk to someone who can help you see how your thoughts at certain times may raise your anxiety.  Seek support from your friends and family. Find someone who calms you down. Ask if you can call them when you are getting anxious.  When  do I need to call the doctor?   You feel you may harm yourself or someone else.  You can also call a mental health hotline for help.  You have any physical symptoms, such as chest pain, trouble breathing, or severe belly pain, that could be a sign of a serious problem.  If you are short of breath.  If you do not feel like you can be alone.  Teach Back: Helping You Understand   The Teach Back Method helps you understand the information we are giving you. After you talk with the staff, tell them in your own words what you learned. This helps to make sure the staff has described each thing clearly. It also helps to explain things that may have been confusing. Before going home, make sure you can do these:  I can tell you about my condition and the drugs I need to take.  I can tell you what may help lower my anxiety.  I can tell you what I will do if it is hard to breathe or I have chest pain.  I can tell you what I will do if I do not feel safe or cannot be alone.  Where can I learn more?   [facility]  https://www.gricelda.org/Learn-More/Mental-Health-Conditions/Anxiety-Disorders   National Health Service  https://www.nhs.uk/conditions/generalised-anxiety-disorder/symptoms/   [employer] [place] [facility] ? [facility]  https://www.sudha.nih.gov/health/relieving-stress-anxiety-jlzuvxawd-asxaphkdyo-loglohqmfm   National [place] of Mental Health  http://www.nimh.nih.gov/health/publications/anxiety-disorders/complete-index.shtml   Last Reviewed Date   2021-06-08  Consumer Information Use and Disclaimer   This information is not specific medical advice and does not replace information you receive from your health care provider. This is only a brief summary of general information. It does NOT include all information about conditions, illnesses, injuries, tests, procedures, treatments, therapies, discharge instructions or life-style choices that may apply to you. You must talk with your health care provider for complete information  about your health and treatment options. This information should not be used to decide whether or not to accept your health care providers advice, instructions or recommendations. Only your health care provider has the knowledge and training to provide advice that is right for you.  Copyright   Copyright © 2021 [employer] and its affiliates and/or licensors. All rights reserved.      Patient Education       Anxiety Discharge Instructions, Adult   About this topic   Anxiety can cause you to feel very worried. It can also cause physical symptoms like chest pain, stomach aches, or trouble sleeping. While mild anxiety is a normal response to stress, it can cause you problems in your everyday life. You may need follow-up care to help manage your anxiety. Anxiety happens in many forms, like:  Being scared all the time that something bad is going to happen. This is generalized anxiety.  Strong bursts of fear where your body has signs that may feel like a heart attack. This is called a panic attack.  Upsetting thoughts that happen often. There is a need to repeat doing certain things to help get rid of the anxiety caused by these thoughts. The thoughts or actions may be about checking on things, touching things, or worry about germs. This is an obsessive-compulsive disorder.  Strong fear of an object, place, or condition. This is a phobia.  Fear that others think bad things about you or being put down by other people. This is social anxiety.  Nightmares, flashbacks, staying away from people, or having panic attacks when reminded of a shocking or hurtful time or place from the past. This is post-traumatic stress.  Anxiety disorder may be treated in many ways. Some kinds of treatment have you talk about your beliefs, fears, and worries. You may learn how certain thoughts or feelings can raise anxiety. You may also learn what steps to take to lower anxiety. Other kinds of treatment may have you look back on a hurtful  event, sad memory, or something you are afraid of. The doctor will help you deal with the feelings that you may have. You may learn ways to cope with unwanted events or thoughts by looking at your fears in a way that feels safe.  What care is needed at home?   Ask your doctor what you need to do when you go home. Make sure you ask questions if you do not understand what the doctor says.  Set a time to talk with a counselor about your worries and feelings. This can help you with your anxiety.  Take care to follow all instructions when you take your medicines.  Limit alcohol and caffeine.  Learn ways to manage stress. Relaxation methods like reflection, deep breathing, and muscle relaxation may be helpful. Things like yoga, exercise, and mike chi are also good.  Talk about your feelings with family members and friends you trust. Talk to someone who can help you see how your thoughts at certain times may raise your anxiety.     What follow-up care is needed?   Your doctor may ask you to make visits to the office to check on your progress. Be sure to keep these visits.  What drugs may be needed?   The doctor may order drugs to help the physical signs of anxiety. Make sure that you take the drugs as taught to you by the doctor. Talk with your doctor about any side effects and ask how long you should take the drug.  Will physical activity be limited?   You may take part in physical activities. Some people are limited because of their anxiety or fear. Talk with your doctor about the right amount of activity for you.  What changes to diet are needed?   Eat a variety of healthy foods and limit drinks with caffeine. You should avoid alcohol, energy drinks, and over-the-counter stimulants.  What problems could happen?   If your anxiety is not treated, it can result in:  Staying away from work or social events  Not being able to do everyday tasks  Keeping away from family and friends  What can be done to prevent this health  problem?   Learn what events, people, or things upset you. Limit your contact with them.  Talk about your feelings. Talk to someone who can help you see how your thoughts at certain times may raise your anxiety.  Seek support from your friends and family. Find someone who calms you down. Ask if you can call them when you are getting anxious.  When do I need to call the doctor?   You feel you may harm yourself or someone else.  You can also call a mental health hotline for help.  You have any physical symptoms, such as chest pain, trouble breathing, or severe belly pain, that could be a sign of a serious problem.  If you are short of breath.  If you do not feel like you can be alone.  Teach Back: Helping You Understand   The Teach Back Method helps you understand the information we are giving you. After you talk with the staff, tell them in your own words what you learned. This helps to make sure the staff has described each thing clearly. It also helps to explain things that may have been confusing. Before going home, make sure you can do these:  I can tell you about my condition and the drugs I need to take.  I can tell you what may help lower my anxiety.  I can tell you what I will do if it is hard to breathe or I have chest pain.  I can tell you what I will do if I do not feel safe or cannot be alone.  Where can I learn more?   [facility]  https://www.gricelda.org/Learn-More/Mental-Health-Conditions/Anxiety-Disorders   National Health Service  https://www.nhs.uk/conditions/generalised-anxiety-disorder/symptoms/   National [place] of Health ? Senior [facility]  https://www.sudha.nih.gov/health/relieving-stress-anxiety-vyzhxwvto-pddzxbjefx-wyumpunktq   National [place] of Mental Health  http://www.nimh.nih.gov/health/publications/anxiety-disorders/complete-index.shtml   Last Reviewed Date   2021-06-08  Consumer Information Use and Disclaimer   This information is not specific medical advice and does not replace information you  receive from your health care provider. This is only a brief summary of general information. It does NOT include all information about conditions, illnesses, injuries, tests, procedures, treatments, therapies, discharge instructions or life-style choices that may apply to you. You must talk with your health care provider for complete information about your health and treatment options. This information should not be used to decide whether or not to accept your health care providers advice, instructions or recommendations. Only your health care provider has the knowledge and training to provide advice that is right for you.  Copyright   Copyright © 2021 [employer] and its affiliates and/or licensors. All rights reserved.

## 2024-01-22 NOTE — PROGRESS NOTES
Subjective:       Patient ID: Isabella Santana is a 58 y.o. female.    Chief Complaint: Follow-up    57 y/o female presents for F/U.    Patient feels well today but does feel fatigued on most days. She was previously found to be anemic so she takes iron tablets but admits she does not take them every day.    Anxiety - chronic, controlled well on clonazepam as needed. Denies se/ar to medication. Denies being depressed. Denies SI/HI.    ADHD - controlled with Vyvanse 50 mg daily. No s/e of CP, palpitations, syncope, Denies se/ar to medication.  checked and is appropriate.    History of a total hysterectomy in 2012 and she takes Estradiol daily which works well. Mammogram UTD.    Cologuard done in September and was negative.    UTD with COVID and FLU vaccines.                    Past Medical History:   Diagnosis Date    ADHD (attention deficit hyperactivity disorder)     Anxiety     Hormone deficiency     Insomnia        Past Surgical History:   Procedure Laterality Date    BREAST BIOPSY Left     HYSTERECTOMY      ROTATOR CUFF REPAIR Left 01/2023    WISDOM TOOTH EXTRACTION         Family History   Problem Relation Age of Onset    Diabetes Mother     Cancer Father     Heart disease Father     Cancer Sister        Social History     Tobacco Use    Smoking status: Former     Types: Vaping with nicotine    Smokeless tobacco: Never   Substance Use Topics    Alcohol use: Yes    Drug use: Never       Patient Active Problem List   Diagnosis    Attention deficit hyperactivity disorder    Breast lump    Primary insomnia    Menopausal disorder    Generalized anxiety disorder    Superior glenoid labrum lesion of left shoulder    Complete rotator cuff tear or rupture of left shoulder, not specified as traumatic    Osteoarthritis of left AC (acromioclavicular) joint    Tendinopathy of left biceps tendon    Greater tuberosity of humerus fracture    Traumatic complete tear of left rotator cuff       Immunization History  "  Administered Date(s) Administered    COVID-19, MRNA, LN-S, PF (Pfizer) (Purple Cap) 07/27/2021, 08/17/2021    Influenza - Quadrivalent - PF *Preferred* (6 months and older) 10/05/2020, 09/23/2021, 10/24/2022, 10/06/2023           Review of Systems   Constitutional:  Positive for fatigue. Negative for activity change, appetite change, chills, diaphoresis and fever.   HENT:  Negative for congestion, ear pain, sinus pain, tinnitus and trouble swallowing.    Eyes:  Negative for visual disturbance.   Respiratory:  Negative for cough, chest tightness, shortness of breath and wheezing.    Cardiovascular:  Negative for chest pain, palpitations and leg swelling.   Gastrointestinal:  Negative for abdominal distention and abdominal pain.   Genitourinary:  Negative for decreased urine volume, dysuria, frequency, hematuria and urgency.   Musculoskeletal:  Negative for gait problem.   Skin:  Negative for color change and rash.   Neurological:  Negative for dizziness, syncope, weakness, light-headedness, numbness and headaches.   Hematological:  Negative for adenopathy. Does not bruise/bleed easily.   Psychiatric/Behavioral:  Negative for behavioral problems, confusion and dysphoric mood. The patient is not nervous/anxious.      Objective:     Vitals:    01/22/24 1438   BP: 102/70   BP Location: Right arm   Patient Position: Sitting   Pulse: 88   SpO2: 99%   Weight: 51.3 kg (113 lb)   Height: 5' 1" (1.549 m)       Physical Exam  Vitals and nursing note reviewed.   Constitutional:       General: She is not in acute distress.     Appearance: Normal appearance. She is not diaphoretic.   HENT:      Head: Normocephalic and atraumatic.      Mouth/Throat:      Mouth: Mucous membranes are moist.      Pharynx: Oropharynx is clear.   Eyes:      Extraocular Movements: Extraocular movements intact.      Conjunctiva/sclera: Conjunctivae normal.   Cardiovascular:      Rate and Rhythm: Normal rate and regular rhythm.      Pulses: Normal " pulses.      Heart sounds: Normal heart sounds.   Pulmonary:      Effort: Pulmonary effort is normal.      Breath sounds: Normal breath sounds. No stridor. No wheezing or rales.   Abdominal:      General: There is no distension.      Tenderness: There is no abdominal tenderness.   Musculoskeletal:         General: No tenderness. Normal range of motion.      Cervical back: Normal range of motion.      Right lower leg: No edema.      Left lower leg: No edema.   Lymphadenopathy:      Cervical: No cervical adenopathy.   Skin:     General: Skin is warm and dry.      Capillary Refill: Capillary refill takes less than 2 seconds.      Findings: No rash.   Neurological:      Mental Status: She is alert and oriented to person, place, and time.   Psychiatric:         Mood and Affect: Mood and affect normal.         Behavior: Behavior normal. Behavior is cooperative.         Thought Content: Thought content normal.         Judgment: Judgment normal.         No visits with results within 6 Month(s) from this visit.   Latest known visit with results is:   Office Visit on 03/22/2023   Component Date Value Ref Range Status    BCS Recommendation External 08/07/2023 Repeat mammogram in 1 year   Final    Cologuard Result 09/30/2023 Negative  Negative Final    WBC 03/22/2023 5.14  4.3 - 10.8 X 10 3/ul Final    RBC 03/22/2023 4.16 (L)  4.2 - 5.4 X 10 6/ul Final    RDW-SD 03/22/2023 41.6  37 - 54 fl Final    Hemoglobin 03/22/2023 11.4 (L)  12 - 16 g/dL Final    Hematocrit 03/22/2023 34.8 (L)  37 - 47 % Final    MCV 03/22/2023 83.7  82 - 100 fl Final    MCH 03/22/2023 27.4  27 - 32 pg Final    MCHC 03/22/2023 32.8  32 - 36 g/dL Final    Platelets 03/22/2023 238  135 - 400 X 10 3/ul Final    Neutrophils 03/22/2023 63.0  34 - 71.1 % Final    Lymphocytes 03/22/2023 29.6  19.3 - 53.1 % Final    Monocytes 03/22/2023 5.6  4.7 - 12.5 % Final    Eosinophils 03/22/2023 1.2  0.7 - 7.0 % Final    Basophils 03/22/2023 0.4  0.2 - 1.2 % Final     Neutrophils Absolute 03/22/2023 3.24  2.15 - 7.56 X 10 3/ul Final    Lymphocytes Absolute 03/22/2023 1.52  0.86 - 4.75 X 10 3/ul Final    Monocytes Absolute 03/22/2023 0.29  0.22 - 1.08 X 10 3/ul Final    Eosinophils Absolute 03/22/2023 0.06  0.04 - 0.54 X 10 3/ul Final    Basophils Absolute 03/22/2023 0.02  0.00 - 0.22 X 10 3/ul Final    Immature Granulocytes Absolute 03/22/2023 0.01  0 - 0.04 X 10 3/ul Final    Immature Granulocytes 03/22/2023 0.2  0 - 0.5 % Final    nRBC# 03/22/2023 0.0  0 - 0.2 /100 WBC Final    nRBC Count Absolute 03/22/2023 0.000  0 - 0.012 x 10 3/ul Final    Glucose 03/22/2023 95  74 - 106 mg/dL Final    BUN 03/22/2023 8.9  6 - 20 mg/dL Final    Creatinine 03/22/2023 0.76  0.50 - 0.90 mg/dL Final    AST 03/22/2023 14  0 - 32 U/L Final    ALT (SGPT) 03/22/2023 12  0 - 33 U/L Final    Alkaline Phosphatase 03/22/2023 59  35 - 105 U/L Final    Calcium 03/22/2023 9.7  8.6 - 10.2 mg/dL Final    Protein, Total 03/22/2023 6.5  6.4 - 8.3 g/dL Final    Albumin 03/22/2023 4.8  3.5 - 5.2 g/dL Final    BILIRUBIN, TOTAL 03/22/2023 0.37  0.00 - 1.20 mg/dL Final    Sodium 03/22/2023 139  136 - 145 mmol/L Final    Potassium 03/22/2023 4.2  3.5 - 5.1 mmol/L Final    Chloride 03/22/2023 100  98 - 107 mmol/L Final    CO2 03/22/2023 29  22 - 29 mmol/L Final    Globulin 03/22/2023 1.7  1.5 - 4.5 g/dL Final    Albumin/Globulin Ratio 03/22/2023 2.8 (H)  1.0 - 2.7 Final    BUN/Creatinine Ratio 03/22/2023 11.7  6 - 20 Final    GFR ESTIMATION 03/22/2023 90.21  >60.00 Final    Anion Gap 03/22/2023 10.0  8.0 - 17.0 mmol/L Final    Hemoglobin A1C 03/22/2023 4.8  4.0 - 6.0 % Final    EST AVERAGE GLUCOSE 03/22/2023 90  NORMAL MG/DL Final    Cholesterol 03/22/2023 211 (H)  100 - 200 mg/dL Final    Triglycerides 03/22/2023 73  0 - 150 mg/dL Final    HDL 03/22/2023 109  >60 mg/dL Final    LDL Cholesterol 03/22/2023 87.4  0 - 100 mg/dL Final    LDL/HDL Ratio 03/22/2023 0.8 (L)  1 - 3 Final    VITAMIN D (25OHD) 03/22/2023 43.1   >30 ng/mL Final    TSH w/reflex to FT4 03/22/2023 1.31  0.27 - 4.20 uIU/mL Final         Assessment:      1. Attention deficit hyperactivity disorder (ADHD), combined type    2. Primary insomnia    3. Iron deficiency anemia due to chronic blood loss    4. Generalized anxiety disorder *Stable   5. Annual physical exam    6. Diabetes mellitus screening    7. Screening cholesterol level    8. Thyroid disorder screen    9. Urine frequency    10. Vitamin D deficiency          Plan:     Attention deficit hyperactivity disorder (ADHD), combined type  -     lisdexamfetamine (VYVANSE) 40 MG Cap; Take 1 capsule (40 mg total) by mouth once daily.  Dispense: 30 capsule; Refill: 0    Primary insomnia    Iron deficiency anemia due to chronic blood loss  -     ferrous sulfate (FEOSOL) 325 mg (65 mg iron) Tab tablet; TAKE 1 TABLET BY MOUTH EVERY DAY WITH BREAKFAST  Dispense: 90 tablet; Refill: 0  -     CBC Auto Differential; Future; Expected date: 01/25/2024    Generalized anxiety disorder  Comments:  Clonazepam refilled as needed  Orders:  -     clonazePAM (KLONOPIN) 1 MG tablet; Take 1 tablet (1 mg total) by mouth 2 (two) times daily as needed for Anxiety.  Dispense: 60 tablet; Refill: 2    Annual physical exam  -     CBC Auto Differential; Future; Expected date: 01/25/2024  -     Comprehensive Metabolic Panel; Future; Expected date: 01/25/2024  -     Hemoglobin A1C; Future; Expected date: 01/25/2024  -     Lipid Panel; Future; Expected date: 01/25/2024  -     TSH; Future; Expected date: 01/25/2024    Diabetes mellitus screening  -     Hemoglobin A1C; Future; Expected date: 01/25/2024    Screening cholesterol level  -     Lipid Panel; Future; Expected date: 01/25/2024    Thyroid disorder screen  -     T4, Free; Future; Expected date: 01/25/2024  -     TSH; Future; Expected date: 01/25/2024    Urine frequency  -     Urinalysis, Reflex to Urine Culture Urine, Clean Catch; Future; Expected date: 01/25/2024    Vitamin D deficiency  -      Vitamin D; Future; Expected date: 01/25/2024         Current Outpatient Medications   Medication Sig Dispense Refill    estradioL (ESTRACE) 1 MG tablet Take 1 tablet (1 mg total) by mouth once daily. 90 tablet 3    clonazePAM (KLONOPIN) 1 MG tablet Take 1 tablet (1 mg total) by mouth 2 (two) times daily as needed for Anxiety. 60 tablet 2    ferrous sulfate (FEOSOL) 325 mg (65 mg iron) Tab tablet TAKE 1 TABLET BY MOUTH EVERY DAY WITH BREAKFAST 90 tablet 0    lisdexamfetamine (VYVANSE) 40 MG Cap Take 1 capsule (40 mg total) by mouth once daily. 30 capsule 0     No current facility-administered medications for this visit.       Medications Discontinued During This Encounter   Medication Reason    lisdexamfetamine (VYVANSE) 50 MG capsule Change in Dosage Form    ferrous sulfate (FEOSOL) 325 mg (65 mg iron) Tab tablet Reorder    clonazePAM (KLONOPIN) 1 MG tablet Reorder    lisdexamfetamine (VYVANSE) 40 MG Cap Reorder       Health Maintenance   Topic Date Due    Hepatitis C Screening  Never done    TETANUS VACCINE  Never done    High Dose Statin  Never done    Shingles Vaccine (1 of 2) Never done    Mammogram  08/07/2024    Lipid Panel  03/22/2028    Colorectal Cancer Screening  Discontinued       Patient Instructions   RTC in 3 months for F/U or sooner if needed.    Patient Education       Anxiety Discharge Instructions, Adult   About this topic   Anxiety can cause you to feel very worried. It can also cause physical symptoms like chest pain, stomach aches, or trouble sleeping. While mild anxiety is a normal response to stress, it can cause you problems in your everyday life. You may need follow-up care to help manage your anxiety. Anxiety happens in many forms, like:  Being scared all the time that something bad is going to happen. This is generalized anxiety.  Strong bursts of fear where your body has signs that may feel like a heart attack. This is called a panic attack.  Upsetting thoughts that happen often.  There is a need to repeat doing certain things to help get rid of the anxiety caused by these thoughts. The thoughts or actions may be about checking on things, touching things, or worry about germs. This is an obsessive-compulsive disorder.  Strong fear of an object, place, or condition. This is a phobia.  Fear that others think bad things about you or being put down by other people. This is social anxiety.  Nightmares, flashbacks, staying away from people, or having panic attacks when reminded of a shocking or hurtful time or place from the past. This is post-traumatic stress.  Anxiety disorder may be treated in many ways. Some kinds of treatment have you talk about your beliefs, fears, and worries. You may learn how certain thoughts or feelings can raise anxiety. You may also learn what steps to take to lower anxiety. Other kinds of treatment may have you look back on a hurtful event, sad memory, or something you are afraid of. The doctor will help you deal with the feelings that you may have. You may learn ways to cope with unwanted events or thoughts by looking at your fears in a way that feels safe.  What care is needed at home?   Ask your doctor what you need to do when you go home. Make sure you ask questions if you do not understand what the doctor says.  Set a time to talk with a counselor about your worries and feelings. This can help you with your anxiety.  Take care to follow all instructions when you take your medicines.  Limit alcohol and caffeine.  Learn ways to manage stress. Relaxation methods like reflection, deep breathing, and muscle relaxation may be helpful. Things like yoga, exercise, and mike chi are also good.  Talk about your feelings with family members and friends you trust. Talk to someone who can help you see how your thoughts at certain times may raise your anxiety.     What follow-up care is needed?   Your doctor may ask you to make visits to the office to check on your progress. Be  sure to keep these visits.  What drugs may be needed?   The doctor may order drugs to help the physical signs of anxiety. Make sure that you take the drugs as taught to you by the doctor. Talk with your doctor about any side effects and ask how long you should take the drug.  Will physical activity be limited?   You may take part in physical activities. Some people are limited because of their anxiety or fear. Talk with your doctor about the right amount of activity for you.  What changes to diet are needed?   Eat a variety of healthy foods and limit drinks with caffeine. You should avoid alcohol, energy drinks, and over-the-counter stimulants.  What problems could happen?   If your anxiety is not treated, it can result in:  Staying away from work or social events  Not being able to do everyday tasks  Keeping away from family and friends  What can be done to prevent this health problem?   Learn what events, people, or things upset you. Limit your contact with them.  Talk about your feelings. Talk to someone who can help you see how your thoughts at certain times may raise your anxiety.  Seek support from your friends and family. Find someone who calms you down. Ask if you can call them when you are getting anxious.  When do I need to call the doctor?   You feel you may harm yourself or someone else.  You can also call a mental health hotline for help.  You have any physical symptoms, such as chest pain, trouble breathing, or severe belly pain, that could be a sign of a serious problem.  If you are short of breath.  If you do not feel like you can be alone.  Teach Back: Helping You Understand   The Teach Back Method helps you understand the information we are giving you. After you talk with the staff, tell them in your own words what you learned. This helps to make sure the staff has described each thing clearly. It also helps to explain things that may have been confusing. Before going home, make sure you can do  these:  I can tell you about my condition and the drugs I need to take.  I can tell you what may help lower my anxiety.  I can tell you what I will do if it is hard to breathe or I have chest pain.  I can tell you what I will do if I do not feel safe or cannot be alone.  Where can I learn more?   [facility]  https://www.gricelda.org/Learn-More/Mental-Health-Conditions/Anxiety-Disorders   National Health Service  https://www.nhs.uk/conditions/generalised-anxiety-disorder/symptoms/   National [place] of Health ? Senior Health  https://www.sudha.nih.gov/health/relieving-stress-anxiety-rmtrwctkr-vxwlxftmio-ltlnzbnsfp   National [place] of Mental Health  http://www.[facility].nih.gov/health/publications/anxiety-disorders/complete-index.shtml   Last Reviewed Date   2021-06-08  Consumer Information Use and Disclaimer   This information is not specific medical advice and does not replace information you receive from your health care provider. This is only a brief summary of general information. It does NOT include all information about conditions, illnesses, injuries, tests, procedures, treatments, therapies, discharge instructions or life-style choices that may apply to you. You must talk with your health care provider for complete information about your health and treatment options. This information should not be used to decide whether or not to accept your health care providers advice, instructions or recommendations. Only your health care provider has the knowledge and training to provide advice that is right for you.  Copyright   Copyright © 2021 [employer], [employer]. and its affiliates and/or licensors. All rights reserved.      Patient Education       Anxiety Discharge Instructions, Adult   About this topic   Anxiety can cause you to feel very worried. It can also cause physical symptoms like chest pain, stomach aches, or trouble sleeping. While mild anxiety is a normal response to stress, it can cause you problems in your everyday  life. You may need follow-up care to help manage your anxiety. Anxiety happens in many forms, like:  Being scared all the time that something bad is going to happen. This is generalized anxiety.  Strong bursts of fear where your body has signs that may feel like a heart attack. This is called a panic attack.  Upsetting thoughts that happen often. There is a need to repeat doing certain things to help get rid of the anxiety caused by these thoughts. The thoughts or actions may be about checking on things, touching things, or worry about germs. This is an obsessive-compulsive disorder.  Strong fear of an object, place, or condition. This is a phobia.  Fear that others think bad things about you or being put down by other people. This is social anxiety.  Nightmares, flashbacks, staying away from people, or having panic attacks when reminded of a shocking or hurtful time or place from the past. This is post-traumatic stress.  Anxiety disorder may be treated in many ways. Some kinds of treatment have you talk about your beliefs, fears, and worries. You may learn how certain thoughts or feelings can raise anxiety. You may also learn what steps to take to lower anxiety. Other kinds of treatment may have you look back on a hurtful event, sad memory, or something you are afraid of. The doctor will help you deal with the feelings that you may have. You may learn ways to cope with unwanted events or thoughts by looking at your fears in a way that feels safe.  What care is needed at home?   Ask your doctor what you need to do when you go home. Make sure you ask questions if you do not understand what the doctor says.  Set a time to talk with a counselor about your worries and feelings. This can help you with your anxiety.  Take care to follow all instructions when you take your medicines.  Limit alcohol and caffeine.  Learn ways to manage stress. Relaxation methods like reflection, deep breathing, and muscle relaxation may be  helpful. Things like yoga, exercise, and mike chi are also good.  Talk about your feelings with family members and friends you trust. Talk to someone who can help you see how your thoughts at certain times may raise your anxiety.     What follow-up care is needed?   Your doctor may ask you to make visits to the office to check on your progress. Be sure to keep these visits.  What drugs may be needed?   The doctor may order drugs to help the physical signs of anxiety. Make sure that you take the drugs as taught to you by the doctor. Talk with your doctor about any side effects and ask how long you should take the drug.  Will physical activity be limited?   You may take part in physical activities. Some people are limited because of their anxiety or fear. Talk with your doctor about the right amount of activity for you.  What changes to diet are needed?   Eat a variety of healthy foods and limit drinks with caffeine. You should avoid alcohol, energy drinks, and over-the-counter stimulants.  What problems could happen?   If your anxiety is not treated, it can result in:  Staying away from work or social events  Not being able to do everyday tasks  Keeping away from family and friends  What can be done to prevent this health problem?   Learn what events, people, or things upset you. Limit your contact with them.  Talk about your feelings. Talk to someone who can help you see how your thoughts at certain times may raise your anxiety.  Seek support from your friends and family. Find someone who calms you down. Ask if you can call them when you are getting anxious.  When do I need to call the doctor?   You feel you may harm yourself or someone else.  You can also call a mental health hotline for help.  You have any physical symptoms, such as chest pain, trouble breathing, or severe belly pain, that could be a sign of a serious problem.  If you are short of breath.  If you do not feel like you can be alone.  Teach Back:  Helping You Understand   The Teach Back Method helps you understand the information we are giving you. After you talk with the staff, tell them in your own words what you learned. This helps to make sure the staff has described each thing clearly. It also helps to explain things that may have been confusing. Before going home, make sure you can do these:  I can tell you about my condition and the drugs I need to take.  I can tell you what may help lower my anxiety.  I can tell you what I will do if it is hard to breathe or I have chest pain.  I can tell you what I will do if I do not feel safe or cannot be alone.  Where can I learn more?   [facility]  https://www.gricelda.org/Learn-More/Mental-Health-Conditions/Anxiety-Disorders   National Health Service  https://www.nhs.uk/conditions/generalised-anxiety-disorder/symptoms/   National [place] of Health ? Senior Health  https://www.sudha.nih.gov/health/relieving-stress-anxiety-eljxcbdmn-ahfdtovyvw-lwkgtsdghp   National [place] of Mental Health  http://www.nimh.nih.gov/health/publications/anxiety-disorders/complete-index.shtml   Last Reviewed Date   2021-06-08  Consumer Information Use and Disclaimer   This information is not specific medical advice and does not replace information you receive from your health care provider. This is only a brief summary of general information. It does NOT include all information about conditions, illnesses, injuries, tests, procedures, treatments, therapies, discharge instructions or life-style choices that may apply to you. You must talk with your health care provider for complete information about your health and treatment options. This information should not be used to decide whether or not to accept your health care providers advice, instructions or recommendations. Only your health care provider has the knowledge and training to provide advice that is right for you.  Copyright   Copyright © 2021 UpToDate, Inc. and its affiliates and/or  licensors. All rights reserved.      Risks, benefits, and alternatives discussed with patient, Patient verbalized understanding of discussed plan of care. Asked patient if any further questions, answered no.    Future Appointments   Date Time Provider Department Center   4/23/2024  4:00 PM Stephanie Camacho, NP [id number]  Jewel Camacho NP

## 2024-02-01 ENCOUNTER — PATIENT MESSAGE (OUTPATIENT)
Dept: PRIMARY CARE CLINIC | Facility: CLINIC | Age: 59
End: 2024-02-01
Payer: COMMERCIAL

## 2024-02-12 ENCOUNTER — PATIENT MESSAGE (OUTPATIENT)
Dept: PRIMARY CARE CLINIC | Facility: CLINIC | Age: 59
End: 2024-02-12
Payer: COMMERCIAL

## 2024-02-12 ENCOUNTER — TELEPHONE (OUTPATIENT)
Dept: PRIMARY CARE CLINIC | Facility: CLINIC | Age: 59
End: 2024-02-12
Payer: COMMERCIAL

## 2024-02-12 DIAGNOSIS — F90.2 ATTENTION DEFICIT HYPERACTIVITY DISORDER (ADHD), COMBINED TYPE: Primary | ICD-10-CM

## 2024-02-12 RX ORDER — LISDEXAMFETAMINE DIMESYLATE 50 MG/1
50 CAPSULE ORAL EVERY MORNING
COMMUNITY
End: 2024-02-12 | Stop reason: SDUPTHER

## 2024-02-12 RX ORDER — LISDEXAMFETAMINE DIMESYLATE 50 MG/1
50 CAPSULE ORAL EVERY MORNING
Qty: 30 CAPSULE | Refills: 0 | Status: SHIPPED | OUTPATIENT
Start: 2024-02-12 | End: 2024-03-11 | Stop reason: SDUPTHER

## 2024-03-11 ENCOUNTER — TELEPHONE (OUTPATIENT)
Dept: UROLOGY | Facility: CLINIC | Age: 59
End: 2024-03-11
Payer: COMMERCIAL

## 2024-03-11 DIAGNOSIS — F90.2 ATTENTION DEFICIT HYPERACTIVITY DISORDER (ADHD), COMBINED TYPE: ICD-10-CM

## 2024-03-11 RX ORDER — LISDEXAMFETAMINE DIMESYLATE 50 MG/1
50 CAPSULE ORAL EVERY MORNING
Qty: 30 CAPSULE | Refills: 0 | Status: SHIPPED | OUTPATIENT
Start: 2024-03-11 | End: 2024-04-12 | Stop reason: SDUPTHER

## 2024-03-26 DIAGNOSIS — N95.9 MENOPAUSAL DISORDER: ICD-10-CM

## 2024-03-26 RX ORDER — ESTRADIOL 1 MG/1
1 TABLET ORAL
Qty: 90 TABLET | Refills: 3 | Status: SHIPPED | OUTPATIENT
Start: 2024-03-26

## 2024-04-12 DIAGNOSIS — F90.2 ATTENTION DEFICIT HYPERACTIVITY DISORDER (ADHD), COMBINED TYPE: ICD-10-CM

## 2024-04-12 RX ORDER — LISDEXAMFETAMINE DIMESYLATE 50 MG/1
50 CAPSULE ORAL EVERY MORNING
Qty: 30 CAPSULE | Refills: 0 | Status: SHIPPED | OUTPATIENT
Start: 2024-04-12 | End: 2024-04-29 | Stop reason: SDUPTHER

## 2024-04-23 ENCOUNTER — OFFICE VISIT (OUTPATIENT)
Dept: PRIMARY CARE CLINIC | Facility: CLINIC | Age: 59
End: 2024-04-23
Payer: COMMERCIAL

## 2024-04-23 VITALS
RESPIRATION RATE: 16 BRPM | BODY MASS INDEX: 20.96 KG/M2 | HEART RATE: 83 BPM | DIASTOLIC BLOOD PRESSURE: 74 MMHG | HEIGHT: 61 IN | SYSTOLIC BLOOD PRESSURE: 114 MMHG | OXYGEN SATURATION: 98 % | WEIGHT: 111 LBS

## 2024-04-23 DIAGNOSIS — F41.1 GENERALIZED ANXIETY DISORDER: ICD-10-CM

## 2024-04-23 DIAGNOSIS — D50.0 IRON DEFICIENCY ANEMIA DUE TO CHRONIC BLOOD LOSS: ICD-10-CM

## 2024-04-23 DIAGNOSIS — F90.2 ATTENTION DEFICIT HYPERACTIVITY DISORDER (ADHD), COMBINED TYPE: ICD-10-CM

## 2024-04-23 DIAGNOSIS — N95.9 MENOPAUSAL DISORDER: ICD-10-CM

## 2024-04-23 DIAGNOSIS — Z00.00 ANNUAL PHYSICAL EXAM: Primary | ICD-10-CM

## 2024-04-23 PROCEDURE — 99396 PREV VISIT EST AGE 40-64: CPT | Mod: S$GLB,,, | Performed by: NURSE PRACTITIONER

## 2024-04-23 PROCEDURE — 1160F RVW MEDS BY RX/DR IN RCRD: CPT | Mod: CPTII,S$GLB,, | Performed by: NURSE PRACTITIONER

## 2024-04-23 PROCEDURE — 3008F BODY MASS INDEX DOCD: CPT | Mod: CPTII,S$GLB,, | Performed by: NURSE PRACTITIONER

## 2024-04-23 PROCEDURE — 3044F HG A1C LEVEL LT 7.0%: CPT | Mod: CPTII,S$GLB,, | Performed by: NURSE PRACTITIONER

## 2024-04-23 PROCEDURE — 1159F MED LIST DOCD IN RCRD: CPT | Mod: CPTII,S$GLB,, | Performed by: NURSE PRACTITIONER

## 2024-04-23 PROCEDURE — 3074F SYST BP LT 130 MM HG: CPT | Mod: CPTII,S$GLB,, | Performed by: NURSE PRACTITIONER

## 2024-04-23 PROCEDURE — 3078F DIAST BP <80 MM HG: CPT | Mod: CPTII,S$GLB,, | Performed by: NURSE PRACTITIONER

## 2024-04-23 RX ORDER — CLONAZEPAM 1 MG/1
1 TABLET ORAL 2 TIMES DAILY PRN
Qty: 60 TABLET | Refills: 2 | Status: SHIPPED | OUTPATIENT
Start: 2024-04-26

## 2024-04-23 NOTE — PATIENT INSTRUCTIONS
RTC in 3 months for F/U or sooner if needed.    Patient Education       Attention Deficit Hyperactivity Disorder (ADHD) Discharge Instructions   About this topic   Attention deficit hyperactivity disorder is also called ADHD or ADD. Most often, this starts at a young age. This disorder makes it hard to focus or sit still. People with ADHD may find it hard to make good decisions. Children with ADHD may have trouble in school and at home. Adults may have problems at work. People with ADHD may also have a problem getting along well with others. While there is no cure for ADHD, you and your doctor can work on a plan that is best for you to treat the signs of ADHD.  What care is needed at home?   Ask your doctor what you need to do when you go home. Make sure you ask questions if you do not understand what the doctor says. This way you will know what you need to do.  Try to get enough sleep at night. Most often adults need 7 to 8 hours each night and children need 8 to 10 hours each night. Rest during the day if you are tired.  Eat and sleep at the same times every day.  Have a plan for where to keep things in your home. Use checklists, things to help you remember, and alarms. A list of things you may need to find in a hurry can be helpful. These can help you put things in the right place and manage your time.  Work on getting better at reading and taking notes.  Have a space that is just for work or homework so you will be able to pay attention. This may be an office or a desk facing a wall. Try using headphones so you cannot hear the other noises.  Do one thing at a time. Keep a list of the next things you were planning to do or say.  Break large jobs or things you have to do into smaller jobs. This will make them easier to finish. Reward yourself along the way.  Have rules that are clear and easy to follow.   Look at where you are the strongest. Give rewards for good behavior, finished schoolwork, or for doing a good  job at work.  Do not do too many things that might cause stress.  What follow-up care is needed?   Your doctor may ask you to make visits to the office to check on your progress. Be sure to keep these visits.  Your doctor may send you to a special mental health doctor. This person will talk with you about the problems you are having. Then, you can work together to find ways to help you manage them.  Your doctor may suggest you try talk therapy to help you live well with your ADHD.  What drugs may be needed?   The doctor may order drugs to:  Help lower your worry  Help you to pay attention  Help you be more calm  Help you be less impulsive  Help keep things in your life balanced  Care for low mood  Will physical activity be limited?   Physical activity will help keep your mind and body in good shape. Talk with your doctor about the right amount of activity for you.  What problems could happen?   Feeling badly about yourself  Raise the chances of you hurting yourself, such as injury in a car accident  Not do well in school and work  Trouble making friends or getting along well with others  Raise your chance to abuse alcohol or drugs  What can be done to prevent this health problem?   The cause of ADHD is not clear, but to lower the chance of your child having ADHD:  Do not drink beer, wine, or mixed drinks (alcohol) while you are pregnant.  Do not smoke or use illegal drugs while you are pregnant.  Keep your child away from things like harmful toxins and chemicals.  Keep your child from having too much time in front of computers, TV, and video games.  Get plenty of exercise.  Be more aware of thoughts, emotions, and experiences each moment. This is mindfulness.  When do I need to call the doctor?   Drugs are not working  Symptoms are getting worse  Teach Back: Helping You Understand   The Teach Back Method helps you understand the information we are giving you. After you talk with the staff, tell them in your own  words what you learned. This helps to make sure the staff has described each thing clearly. It also helps to explain things that may have been confusing. Before going home, make sure you can do these:  I can tell you about my condition.  I can tell you ways to help me pay attention.  I can tell you what I will do if I think my drugs are not working.  Where can I learn more?   HelpGuide.org  http://www.helpguide.org/articles/add-adhd/attention-deficit-disorder-adhd-parenting-tips.htm   [facility]  http://kidshealth.org/parent/medical/learning/adhd.html   [place] [facility]  http://www.[facility].nih.gov/health/topics/attention-deficit-hyperactivity-disorder-adhd/index.shtml   Last Reviewed Date   2020-06-14  Consumer Information Use and Disclaimer   This information is not specific medical advice and does not replace information you receive from your health care provider. This is only a brief summary of general information. It does NOT include all information about conditions, illnesses, injuries, tests, procedures, treatments, therapies, discharge instructions or life-style choices that may apply to you. You must talk with your health care provider for complete information about your health and treatment options. This information should not be used to decide whether or not to accept your health care providers advice, instructions or recommendations. Only your health care provider has the knowledge and training to provide advice that is right for you.  Copyright   Copyright © 2021 [employer] and its affiliates and/or licensors. All rights reserved.    Patient Education       Anxiety Discharge Instructions, Adult   About this topic   Anxiety can cause you to feel very worried. It can also cause physical symptoms like chest pain, stomach aches, or trouble sleeping. While mild anxiety is a normal response to stress, it can cause you problems in your everyday life. You may need follow-up care to help  manage your anxiety. Anxiety happens in many forms, like:  Being scared all the time that something bad is going to happen. This is generalized anxiety.  Strong bursts of fear where your body has signs that may feel like a heart attack. This is called a panic attack.  Upsetting thoughts that happen often. There is a need to repeat doing certain things to help get rid of the anxiety caused by these thoughts. The thoughts or actions may be about checking on things, touching things, or worry about germs. This is an obsessive-compulsive disorder.  Strong fear of an object, place, or condition. This is a phobia.  Fear that others think bad things about you or being put down by other people. This is social anxiety.  Nightmares, flashbacks, staying away from people, or having panic attacks when reminded of a shocking or hurtful time or place from the past. This is post-traumatic stress.  Anxiety disorder may be treated in many ways. Some kinds of treatment have you talk about your beliefs, fears, and worries. You may learn how certain thoughts or feelings can raise anxiety. You may also learn what steps to take to lower anxiety. Other kinds of treatment may have you look back on a hurtful event, sad memory, or something you are afraid of. The doctor will help you deal with the feelings that you may have. You may learn ways to cope with unwanted events or thoughts by looking at your fears in a way that feels safe.  What care is needed at home?   Ask your doctor what you need to do when you go home. Make sure you ask questions if you do not understand what the doctor says.  Set a time to talk with a counselor about your worries and feelings. This can help you with your anxiety.  Take care to follow all instructions when you take your medicines.  Limit alcohol and caffeine.  Learn ways to manage stress. Relaxation methods like reflection, deep breathing, and muscle relaxation may be helpful. Things like yoga, exercise, and  mike chi are also good.  Talk about your feelings with family members and friends you trust. Talk to someone who can help you see how your thoughts at certain times may raise your anxiety.     What follow-up care is needed?   Your doctor may ask you to make visits to the office to check on your progress. Be sure to keep these visits.  What drugs may be needed?   The doctor may order drugs to help the physical signs of anxiety. Make sure that you take the drugs as taught to you by the doctor. Talk with your doctor about any side effects and ask how long you should take the drug.  Will physical activity be limited?   You may take part in physical activities. Some people are limited because of their anxiety or fear. Talk with your doctor about the right amount of activity for you.  What changes to diet are needed?   Eat a variety of healthy foods and limit drinks with caffeine. You should avoid alcohol, energy drinks, and over-the-counter stimulants.  What problems could happen?   If your anxiety is not treated, it can result in:  Staying away from work or social events  Not being able to do everyday tasks  Keeping away from family and friends  What can be done to prevent this health problem?   Learn what events, people, or things upset you. Limit your contact with them.  Talk about your feelings. Talk to someone who can help you see how your thoughts at certain times may raise your anxiety.  Seek support from your friends and family. Find someone who calms you down. Ask if you can call them when you are getting anxious.  When do I need to call the doctor?   You feel you may harm yourself or someone else.  You can also call a mental health hotline for help.  You have any physical symptoms, such as chest pain, trouble breathing, or severe belly pain, that could be a sign of a serious problem.  If you are short of breath.  If you do not feel like you can be alone.  Teach Back: Helping You Understand   The Teach Back  Method helps you understand the information we are giving you. After you talk with the staff, tell them in your own words what you learned. This helps to make sure the staff has described each thing clearly. It also helps to explain things that may have been confusing. Before going home, make sure you can do these:  I can tell you about my condition and the drugs I need to take.  I can tell you what may help lower my anxiety.  I can tell you what I will do if it is hard to breathe or I have chest pain.  I can tell you what I will do if I do not feel safe or cannot be alone.  Where can I learn more?   [employer]  https://www.[facility].org/Learn-More/Mental-Health-Conditions/Anxiety-Disorders   [facility]  https://www.nhs.uk/conditions/generalised-anxiety-disorder/symptoms/   [facility] [place] [facility]  https://www.[facility].nih.gov/health/relieving-stress-anxiety-fmgutbzbc-iudftevlru-dslwedmswn   [place] [facility]  http://www.nimh.nih.gov/health/publications/anxiety-disorders/complete-index.shtml   Last Reviewed Date   2021-06-08  Consumer Information Use and Disclaimer   This information is not specific medical advice and does not replace information you receive from your health care provider. This is only a brief summary of general information. It does NOT include all information about conditions, illnesses, injuries, tests, procedures, treatments, therapies, discharge instructions or life-style choices that may apply to you. You must talk with your health care provider for complete information about your health and treatment options. This information should not be used to decide whether or not to accept your health care providers advice, instructions or recommendations. Only your health care provider has the knowledge and training to provide advice that is right for you.  Copyright   Copyright © 2021 [employer] and its affiliates and/or licensors. All rights reserved.

## 2024-04-23 NOTE — PROGRESS NOTES
Subjective:       Patient ID: Isabella Santana is a 58 y.o. female.    Chief Complaint: Annual Exam    57 y/o female presents for annual visit.    Ms. Santana feels well today with no new issues or concerns.    Ms. Santana has rotator cuff injury to her left shoulder which she is seeing Dr. Medina for. She was last seen on 03/28/24 and received an injection and she is to follow up as needed.    Patient does feel fatigued on most days. She was previously found to be anemic so she takes iron tablets but admits she does not take them every day.    Anxiety - chronic, controlled well on clonazepam as needed. Denies se/ar to medication. Denies being depressed. Denies SI/HI. Needs refill.    ADHD - controlled with Vyvanse 50 mg daily. No s/e of CP, palpitations, syncope, Denies se/ar to medication.  checked and is appropriate.    Patient had a total hysterectomy in 2012 and she takes Estradiol daily which works well. Mammogram UTD.    Cologuard done in September and was negative.    UTD with COVID and FLU vaccines.                    Past Medical History:   Diagnosis Date    ADHD (attention deficit hyperactivity disorder)     Anxiety     Hormone deficiency     Insomnia        Past Surgical History:   Procedure Laterality Date    BREAST BIOPSY Left     HYSTERECTOMY      ROTATOR CUFF REPAIR Left 01/2023    WISDOM TOOTH EXTRACTION         Family History   Problem Relation Name Age of Onset    Diabetes Mother      Cancer Father      Heart disease Father      Cancer Sister         Social History     Tobacco Use    Smoking status: Former     Types: Vaping with nicotine    Smokeless tobacco: Never   Substance Use Topics    Alcohol use: Yes    Drug use: Never       Patient Active Problem List   Diagnosis    Attention deficit hyperactivity disorder    Breast lump    Primary insomnia    Menopausal disorder    Generalized anxiety disorder    Superior glenoid labrum lesion of left shoulder    Complete rotator cuff tear or rupture  "of left shoulder, not specified as traumatic    Osteoarthritis of left AC (acromioclavicular) joint    Tendinopathy of left biceps tendon    Greater tuberosity of humerus fracture    Traumatic complete tear of left rotator cuff       Immunization History   Administered Date(s) Administered    COVID-19, MRNA, LN-S, PF (Pfizer) (Purple Cap) 07/27/2021, 08/17/2021    Influenza - Quadrivalent - PF *Preferred* (6 months and older) 10/05/2020, 09/23/2021, 10/24/2022, 10/06/2023           Review of Systems   Constitutional:  Positive for fatigue. Negative for activity change, appetite change, chills, diaphoresis and fever.   HENT:  Negative for congestion, ear pain, sinus pain, tinnitus and trouble swallowing.    Eyes:  Negative for visual disturbance.   Respiratory:  Negative for cough, chest tightness, shortness of breath and wheezing.    Cardiovascular:  Negative for chest pain, palpitations and leg swelling.   Gastrointestinal:  Negative for abdominal distention, abdominal pain, blood in stool, constipation, diarrhea, nausea and vomiting.   Endocrine: Negative for cold intolerance, heat intolerance, polydipsia, polyphagia and polyuria.   Genitourinary:  Negative for decreased urine volume, dysuria, frequency, hematuria and urgency.   Musculoskeletal:  Positive for arthralgias (left shoulder). Negative for back pain, gait problem and neck pain.   Skin:  Negative for color change and rash.   Neurological:  Negative for dizziness, syncope, weakness, light-headedness, numbness and headaches.   Psychiatric/Behavioral:  Negative for behavioral problems, confusion and dysphoric mood. The patient is not nervous/anxious.      Objective:     Vitals:    04/23/24 1557   BP: 114/74   BP Location: Right arm   Patient Position: Sitting   BP Method: Medium (Automatic)   Pulse: 83   Resp: 16   SpO2: 98%   Weight: 50.3 kg (111 lb)   Height: 5' 1" (1.549 m)       Physical Exam  Vitals and nursing note reviewed.   Constitutional:       " General: She is not in acute distress.     Appearance: Normal appearance. She is not diaphoretic.   HENT:      Head: Normocephalic and atraumatic.      Nose: Nose normal.      Mouth/Throat:      Mouth: Mucous membranes are moist.      Pharynx: Oropharynx is clear.   Eyes:      General:         Right eye: No discharge.         Left eye: No discharge.      Conjunctiva/sclera: Conjunctivae normal.      Pupils: Pupils are equal, round, and reactive to light.   Neck:      Thyroid: No thyromegaly or thyroid tenderness.   Cardiovascular:      Rate and Rhythm: Normal rate and regular rhythm.      Pulses: Normal pulses.      Heart sounds: Normal heart sounds.   Pulmonary:      Effort: Pulmonary effort is normal.      Breath sounds: Normal breath sounds. No stridor. No wheezing or rales.   Abdominal:      General: Bowel sounds are normal. There is no distension.      Palpations: Abdomen is soft.      Tenderness: There is no abdominal tenderness. There is no guarding.   Musculoskeletal:         General: Tenderness (left shoulder) present. No swelling. Normal range of motion.      Cervical back: Normal range of motion.      Right lower leg: No edema.      Left lower leg: No edema.   Lymphadenopathy:      Cervical: No cervical adenopathy.   Skin:     General: Skin is warm and dry.      Capillary Refill: Capillary refill takes less than 2 seconds.      Findings: No rash.   Neurological:      General: No focal deficit present.      Mental Status: She is alert and oriented to person, place, and time.   Psychiatric:         Mood and Affect: Mood and affect normal.         Speech: Speech normal.         Behavior: Behavior normal. Behavior is cooperative.         Thought Content: Thought content normal.         Cognition and Memory: Cognition and memory normal.         Judgment: Judgment normal.         No visits with results within 6 Month(s) from this visit.   Latest known visit with results is:   Office Visit on 03/22/2023    Component Date Value Ref Range Status    BCS Recommendation External 08/07/2023 Repeat mammogram in 1 year   Final    Cologuard Result 09/30/2023 Negative  Negative Final    WBC 03/22/2023 5.14  4.3 - 10.8 X 10 3/ul Final    RBC 03/22/2023 4.16 (L)  4.2 - 5.4 X 10 6/ul Final    RDW-SD 03/22/2023 41.6  37 - 54 fl Final    Hemoglobin 03/22/2023 11.4 (L)  12 - 16 g/dL Final    Hematocrit 03/22/2023 34.8 (L)  37 - 47 % Final    MCV 03/22/2023 83.7  82 - 100 fl Final    MCH 03/22/2023 27.4  27 - 32 pg Final    MCHC 03/22/2023 32.8  32 - 36 g/dL Final    Platelets 03/22/2023 238  135 - 400 X 10 3/ul Final    Neutrophils 03/22/2023 63.0  34 - 71.1 % Final    Lymphocytes 03/22/2023 29.6  19.3 - 53.1 % Final    Monocytes 03/22/2023 5.6  4.7 - 12.5 % Final    Eosinophils 03/22/2023 1.2  0.7 - 7.0 % Final    Basophils 03/22/2023 0.4  0.2 - 1.2 % Final    Neutrophils Absolute 03/22/2023 3.24  2.15 - 7.56 X 10 3/ul Final    Lymphocytes Absolute 03/22/2023 1.52  0.86 - 4.75 X 10 3/ul Final    Monocytes Absolute 03/22/2023 0.29  0.22 - 1.08 X 10 3/ul Final    Eosinophils Absolute 03/22/2023 0.06  0.04 - 0.54 X 10 3/ul Final    Basophils Absolute 03/22/2023 0.02  0.00 - 0.22 X 10 3/ul Final    Immature Granulocytes Absolute 03/22/2023 0.01  0 - 0.04 X 10 3/ul Final    Immature Granulocytes 03/22/2023 0.2  0 - 0.5 % Final    nRBC# 03/22/2023 0.0  0 - 0.2 /100 WBC Final    nRBC Count Absolute 03/22/2023 0.000  0 - 0.012 x 10 3/ul Final    Glucose 03/22/2023 95  74 - 106 mg/dL Final    BUN 03/22/2023 8.9  6 - 20 mg/dL Final    Creatinine 03/22/2023 0.76  0.50 - 0.90 mg/dL Final    AST 03/22/2023 14  0 - 32 U/L Final    ALT (SGPT) 03/22/2023 12  0 - 33 U/L Final    Alkaline Phosphatase 03/22/2023 59  35 - 105 U/L Final    Calcium 03/22/2023 9.7  8.6 - 10.2 mg/dL Final    Protein, Total 03/22/2023 6.5  6.4 - 8.3 g/dL Final    Albumin 03/22/2023 4.8  3.5 - 5.2 g/dL Final    BILIRUBIN, TOTAL 03/22/2023 0.37  0.00 - 1.20 mg/dL Final     Sodium 03/22/2023 139  136 - 145 mmol/L Final    Potassium 03/22/2023 4.2  3.5 - 5.1 mmol/L Final    Chloride 03/22/2023 100  98 - 107 mmol/L Final    CO2 03/22/2023 29  22 - 29 mmol/L Final    Globulin 03/22/2023 1.7  1.5 - 4.5 g/dL Final    Albumin/Globulin Ratio 03/22/2023 2.8 (H)  1.0 - 2.7 Final    BUN/Creatinine Ratio 03/22/2023 11.7  6 - 20 Final    GFR ESTIMATION 03/22/2023 90.21  >60.00 Final    Anion Gap 03/22/2023 10.0  8.0 - 17.0 mmol/L Final    Hemoglobin A1C 03/22/2023 4.8  4.0 - 6.0 % Final    EST AVERAGE GLUCOSE 03/22/2023 90  NORMAL MG/DL Final    Cholesterol 03/22/2023 211 (H)  100 - 200 mg/dL Final    Triglycerides 03/22/2023 73  0 - 150 mg/dL Final    HDL 03/22/2023 109  >60 mg/dL Final    LDL Cholesterol 03/22/2023 87.4  0 - 100 mg/dL Final    LDL/HDL Ratio 03/22/2023 0.8 (L)  1 - 3 Final    VITAMIN D (25OHD) 03/22/2023 43.1  >30 ng/mL Final    TSH w/reflex to FT4 03/22/2023 1.31  0.27 - 4.20 uIU/mL Final         Assessment:      1. Annual physical exam    2. Attention deficit hyperactivity disorder (ADHD), combined type    3. Iron deficiency anemia due to chronic blood loss    4. Menopausal disorder    5. Generalized anxiety disorder Stable         Plan:     Annual physical exam    Attention deficit hyperactivity disorder (ADHD), combined type    Iron deficiency anemia due to chronic blood loss    Menopausal disorder    Generalized anxiety disorder  Comments:  Clonazepam refilled as needed  Orders:  -     clonazePAM (KLONOPIN) 1 MG tablet; Take 1 tablet (1 mg total) by mouth 2 (two) times daily as needed for Anxiety.  Dispense: 60 tablet; Refill: 2         Current Outpatient Medications   Medication Sig Dispense Refill    estradioL (ESTRACE) 1 MG tablet TAKE 1 TABLET BY MOUTH EVERY DAY 90 tablet 3    clonazePAM (KLONOPIN) 1 MG tablet Take 1 tablet (1 mg total) by mouth 2 (two) times daily as needed for Anxiety. 60 tablet 2    ferrous sulfate (FEOSOL) 325 mg (65 mg iron) Tab tablet TAKE 1 TABLET  BY MOUTH EVERY DAY WITH BREAKFAST 90 tablet 0    lisdexamfetamine (VYVANSE) 50 MG capsule Take 1 capsule (50 mg total) by mouth every morning. 30 capsule 0     No current facility-administered medications for this visit.       Medications Discontinued During This Encounter   Medication Reason    lisdexamfetamine (VYVANSE) 40 MG Cap Change in Dosage Form    clonazePAM (KLONOPIN) 1 MG tablet Reorder       Health Maintenance   Topic Date Due    Hepatitis C Screening  Never done    TETANUS VACCINE  Never done    Shingles Vaccine (1 of 2) Never done    Mammogram  08/07/2024    Lipid Panel  04/24/2029    Colorectal Cancer Screening  Discontinued       Patient Instructions   RTC in 3 months for F/U or sooner if needed.    Patient Education       Attention Deficit Hyperactivity Disorder (ADHD) Discharge Instructions   About this topic   Attention deficit hyperactivity disorder is also called ADHD or ADD. Most often, this starts at a young age. This disorder makes it hard to focus or sit still. People with ADHD may find it hard to make good decisions. Children with ADHD may have trouble in school and at home. Adults may have problems at work. People with ADHD may also have a problem getting along well with others. While there is no cure for ADHD, you and your doctor can work on a plan that is best for you to treat the signs of ADHD.  What care is needed at home?   Ask your doctor what you need to do when you go home. Make sure you ask questions if you do not understand what the doctor says. This way you will know what you need to do.  Try to get enough sleep at night. Most often adults need 7 to 8 hours each night and children need 8 to 10 hours each night. Rest during the day if you are tired.  Eat and sleep at the same times every day.  Have a plan for where to keep things in your home. Use checklists, things to help you remember, and alarms. A list of things you may need to find in a hurry can be helpful. These can help  you put things in the right place and manage your time.  Work on getting better at reading and taking notes.  Have a space that is just for work or homework so you will be able to pay attention. This may be an office or a desk facing a wall. Try using headphones so you cannot hear the other noises.  Do one thing at a time. Keep a list of the next things you were planning to do or say.  Break large jobs or things you have to do into smaller jobs. This will make them easier to finish. Reward yourself along the way.  Have rules that are clear and easy to follow.   Look at where you are the strongest. Give rewards for good behavior, finished schoolwork, or for doing a good job at work.  Do not do too many things that might cause stress.  What follow-up care is needed?   Your doctor may ask you to make visits to the office to check on your progress. Be sure to keep these visits.  Your doctor may send you to a special mental health doctor. This person will talk with you about the problems you are having. Then, you can work together to find ways to help you manage them.  Your doctor may suggest you try talk therapy to help you live well with your ADHD.  What drugs may be needed?   The doctor may order drugs to:  Help lower your worry  Help you to pay attention  Help you be more calm  Help you be less impulsive  Help keep things in your life balanced  Care for low mood  Will physical activity be limited?   Physical activity will help keep your mind and body in good shape. Talk with your doctor about the right amount of activity for you.  What problems could happen?   Feeling badly about yourself  Raise the chances of you hurting yourself, such as injury in a car accident  Not do well in school and work  Trouble making friends or getting along well with others  Raise your chance to abuse alcohol or drugs  What can be done to prevent this health problem?   The cause of ADHD is not clear, but to lower the chance of your child  having ADHD:  Do not drink beer, wine, or mixed drinks (alcohol) while you are pregnant.  Do not smoke or use illegal drugs while you are pregnant.  Keep your child away from things like harmful toxins and chemicals.  Keep your child from having too much time in front of computers, TV, and video games.  Get plenty of exercise.  Be more aware of thoughts, emotions, and experiences each moment. This is mindfulness.  When do I need to call the doctor?   Drugs are not working  Symptoms are getting worse  Teach Back: Helping You Understand   The Teach Back Method helps you understand the information we are giving you. After you talk with the staff, tell them in your own words what you learned. This helps to make sure the staff has described each thing clearly. It also helps to explain things that may have been confusing. Before going home, make sure you can do these:  I can tell you about my condition.  I can tell you ways to help me pay attention.  I can tell you what I will do if I think my drugs are not working.  Where can I learn more?   HelpGuide.org  http://www.helpguide.org/articles/add-adhd/attention-deficit-disorder-adhd-parenting-tips.htm   KidsHealth  http://kidshealth.org/parent/medical/learning/adhd.html   National [place] [employer]  http://www.nimh.nih.gov/health/topics/attention-deficit-hyperactivity-disorder-adhd/index.shtml   Last Reviewed Date   2020-06-14  Consumer Information Use and Disclaimer   This information is not specific medical advice and does not replace information you receive from your health care provider. This is only a brief summary of general information. It does NOT include all information about conditions, illnesses, injuries, tests, procedures, treatments, therapies, discharge instructions or life-style choices that may apply to you. You must talk with your health care provider for complete information about your health and treatment options. This information should not be  used to decide whether or not to accept your health care providers advice, instructions or recommendations. Only your health care provider has the knowledge and training to provide advice that is right for you.  Copyright   Copyright © 2021 UpToDate, Inc. and its affiliates and/or licensors. All rights reserved.    Patient Education       Anxiety Discharge Instructions, Adult   About this topic   Anxiety can cause you to feel very worried. It can also cause physical symptoms like chest pain, stomach aches, or trouble sleeping. While mild anxiety is a normal response to stress, it can cause you problems in your everyday life. You may need follow-up care to help manage your anxiety. Anxiety happens in many forms, like:  Being scared all the time that something bad is going to happen. This is generalized anxiety.  Strong bursts of fear where your body has signs that may feel like a heart attack. This is called a panic attack.  Upsetting thoughts that happen often. There is a need to repeat doing certain things to help get rid of the anxiety caused by these thoughts. The thoughts or actions may be about checking on things, touching things, or worry about germs. This is an obsessive-compulsive disorder.  Strong fear of an object, place, or condition. This is a phobia.  Fear that others think bad things about you or being put down by other people. This is social anxiety.  Nightmares, flashbacks, staying away from people, or having panic attacks when reminded of a shocking or hurtful time or place from the past. This is post-traumatic stress.  Anxiety disorder may be treated in many ways. Some kinds of treatment have you talk about your beliefs, fears, and worries. You may learn how certain thoughts or feelings can raise anxiety. You may also learn what steps to take to lower anxiety. Other kinds of treatment may have you look back on a hurtful event, sad memory, or something you are afraid of. The doctor will help you  deal with the feelings that you may have. You may learn ways to cope with unwanted events or thoughts by looking at your fears in a way that feels safe.  What care is needed at home?   Ask your doctor what you need to do when you go home. Make sure you ask questions if you do not understand what the doctor says.  Set a time to talk with a counselor about your worries and feelings. This can help you with your anxiety.  Take care to follow all instructions when you take your medicines.  Limit alcohol and caffeine.  Learn ways to manage stress. Relaxation methods like reflection, deep breathing, and muscle relaxation may be helpful. Things like yoga, exercise, and mike chi are also good.  Talk about your feelings with family members and friends you trust. Talk to someone who can help you see how your thoughts at certain times may raise your anxiety.     What follow-up care is needed?   Your doctor may ask you to make visits to the office to check on your progress. Be sure to keep these visits.  What drugs may be needed?   The doctor may order drugs to help the physical signs of anxiety. Make sure that you take the drugs as taught to you by the doctor. Talk with your doctor about any side effects and ask how long you should take the drug.  Will physical activity be limited?   You may take part in physical activities. Some people are limited because of their anxiety or fear. Talk with your doctor about the right amount of activity for you.  What changes to diet are needed?   Eat a variety of healthy foods and limit drinks with caffeine. You should avoid alcohol, energy drinks, and over-the-counter stimulants.  What problems could happen?   If your anxiety is not treated, it can result in:  Staying away from work or social events  Not being able to do everyday tasks  Keeping away from family and friends  What can be done to prevent this health problem?   Learn what events, people, or things upset you. Limit your contact  with them.  Talk about your feelings. Talk to someone who can help you see how your thoughts at certain times may raise your anxiety.  Seek support from your friends and family. Find someone who calms you down. Ask if you can call them when you are getting anxious.  When do I need to call the doctor?   You feel you may harm yourself or someone else.  You can also call a mental health hotline for help.  You have any physical symptoms, such as chest pain, trouble breathing, or severe belly pain, that could be a sign of a serious problem.  If you are short of breath.  If you do not feel like you can be alone.  Teach Back: Helping You Understand   The Teach Back Method helps you understand the information we are giving you. After you talk with the staff, tell them in your own words what you learned. This helps to make sure the staff has described each thing clearly. It also helps to explain things that may have been confusing. Before going home, make sure you can do these:  I can tell you about my condition and the drugs I need to take.  I can tell you what may help lower my anxiety.  I can tell you what I will do if it is hard to breathe or I have chest pain.  I can tell you what I will do if I do not feel safe or cannot be alone.  Where can I learn more?   [employer]  https://www.gricelda.org/Learn-More/Mental-Health-Conditions/Anxiety-Disorders   National Health Service  https://www.nhs.uk/conditions/generalised-anxiety-disorder/symptoms/   [employer] [place] [employer] ? [facility]  https://www.sudha.nih.gov/health/relieving-stress-anxiety-yuzarabao-bvgqhslhcu-uaqficzexb   [employer] [place] of [employer] Health  http://www.nimh.nih.gov/health/publications/anxiety-disorders/complete-index.shtml   Last Reviewed Date   2021-06-08  Consumer Information Use and Disclaimer   This information is not specific medical advice and does not replace information you receive from your health care provider. This is only a brief summary of  general information. It does NOT include all information about conditions, illnesses, injuries, tests, procedures, treatments, therapies, discharge instructions or life-style choices that may apply to you. You must talk with your health care provider for complete information about your health and treatment options. This information should not be used to decide whether or not to accept your health care providers advice, instructions or recommendations. Only your health care provider has the knowledge and training to provide advice that is right for you.  Copyright   Copyright © 2021 UpToDate, Inc. and its affiliates and/or licensors. All rights reserved.      Risks, benefits, and alternatives discussed with patient, Patient verbalized understanding of discussed plan of care. Asked patient if any further questions, answered no.    Future Appointments   Date Time Provider Department Center   7/30/2024  3:40 PM Stephanie Camacho NP [id number]  Jewel Camacho NP

## 2024-04-24 LAB
25(OH)D3 SERPL-MCNC: 32 NG/ML
ABS NRBC COUNT: 0 THOU/UL (ref 0–0.01)
ABSOLUTE BASOPHIL: 0 10*3/UL (ref 0–0.3)
ABSOLUTE EOSINOPHIL: 0.2 10*3/UL (ref 0–0.6)
ABSOLUTE IMMATURE GRAN: 0.02 THOU/UL (ref 0–0.03)
ABSOLUTE LYMPHOCYTE: 1 10*3/UL (ref 1.2–4)
ABSOLUTE MONOCYTE: 0.2 10*3/UL (ref 0.1–0.8)
ALBUMIN SERPL BCP-MCNC: 3.9 G/DL (ref 3.4–5)
ALP SERPL-CCNC: 54 U/L (ref 45–117)
ALT SERPL W P-5'-P-CCNC: 20 U/L (ref 13–56)
ANION GAP SERPL CALC-SCNC: 5 MMOL/L (ref 3–11)
AST SERPL-CCNC: 11 U/L (ref 15–37)
BASOPHILS NFR BLD: 0.9 % (ref 0–3)
BILIRUB SERPL-MCNC: 0.5 MG/DL (ref 0.2–1)
BUN SERPL-MCNC: 12 MG/DL (ref 7–18)
BUN/CREAT SERPL: 15.58 RATIO
CALCIUM SERPL-MCNC: 9.8 MG/DL (ref 8.5–10.1)
CHLORIDE SERPL-SCNC: 105 MMOL/L (ref 98–107)
CHOLEST SERPL-MSCNC: 237 MG/DL
CO2 SERPL-SCNC: 28 MMOL/L (ref 21–32)
CREAT SERPL-MCNC: 0.77 MG/DL (ref 0.55–1.02)
EOSINOPHIL NFR BLD: 4.5 % (ref 0–6)
ERYTHROCYTE [DISTWIDTH] IN BLOOD BY AUTOMATED COUNT: 13.5 % (ref 0–15.5)
ESTIMATED AVG GLUCOSE: 94 MG/DL
GFR ESTIMATION: > 60
GLUCOSE SERPL-MCNC: 93 MG/DL (ref 74–106)
HBA1C MFR BLD: 4.8 % (ref 4.2–6.3)
HCT VFR BLD AUTO: 40.1 % (ref 37–47)
HDLC SERPL-MCNC: 119 MG/DL
HGB BLD-MCNC: 12.8 G/DL (ref 12–16)
IMMATURE GRANULOCYTES: 0.6 % (ref 0–0.43)
LDLC SERPL CALC-MCNC: 102 MG/DL
LYMPHOCYTES NFR BLD: 29.9 % (ref 20–45)
MCH RBC QN AUTO: 27.1 PG (ref 27–32)
MCHC RBC AUTO-ENTMCNC: 31.9 % (ref 32–36)
MCV RBC AUTO: 84.8 FL (ref 80–99)
MONOCYTES NFR BLD: 6.6 % (ref 2–10)
NEUTROPHILS # BLD AUTO: 1.9 10*3/UL (ref 1.4–7)
NEUTROPHILS NFR BLD: 57.5 % (ref 50–80)
NUCLEATED RED BLOOD CELLS: 0 % (ref 0–0.2)
PLATELETS: 237 10*3/UL (ref 130–400)
PMV BLD AUTO: 10.8 FL (ref 9.2–12.2)
POTASSIUM SERPL-SCNC: 5.2 MMOL/L (ref 3.5–5.1)
PROT SERPL-MCNC: 7 G/DL (ref 6.4–8.2)
RBC # BLD AUTO: 4.73 10*6/UL (ref 4.2–5.4)
SODIUM BLD-SCNC: 138 MMOL/L (ref 131–143)
T4 FREE SP9 P CHAL SERPL-SCNC: 1.15 NG/DL (ref 0.76–1.46)
TRIGL SERPL-MCNC: 80 MG/DL (ref 0–149)
TSH SERPL DL<=0.005 MIU/L-ACNC: 1.39 UIU/ML (ref 0.36–3.74)
VLDL CHOLESTEROL: 16 MG/DL
WBC # BLD: 3.3 10*3/UL (ref 4.5–10)

## 2024-04-28 DIAGNOSIS — D50.0 IRON DEFICIENCY ANEMIA DUE TO CHRONIC BLOOD LOSS: ICD-10-CM

## 2024-04-29 ENCOUNTER — PATIENT MESSAGE (OUTPATIENT)
Dept: PRIMARY CARE CLINIC | Facility: CLINIC | Age: 59
End: 2024-04-29
Payer: COMMERCIAL

## 2024-04-29 DIAGNOSIS — F90.2 ATTENTION DEFICIT HYPERACTIVITY DISORDER (ADHD), COMBINED TYPE: ICD-10-CM

## 2024-04-29 RX ORDER — FERROUS SULFATE 325(65) MG
TABLET ORAL
Qty: 90 TABLET | Refills: 0 | Status: SHIPPED | OUTPATIENT
Start: 2024-04-29

## 2024-04-29 RX ORDER — LISDEXAMFETAMINE DIMESYLATE 50 MG/1
50 CAPSULE ORAL EVERY MORNING
Qty: 30 CAPSULE | Refills: 0 | Status: SHIPPED | OUTPATIENT
Start: 2024-04-29 | End: 2024-06-10 | Stop reason: SDUPTHER

## 2024-04-29 NOTE — TELEPHONE ENCOUNTER
"Patient sent a msg via [employer] regarding results of U/A, "Urine test done, they put it in my file under media. Thanks"  "

## 2024-06-10 DIAGNOSIS — F90.2 ATTENTION DEFICIT HYPERACTIVITY DISORDER (ADHD), COMBINED TYPE: ICD-10-CM

## 2024-06-10 RX ORDER — LISDEXAMFETAMINE DIMESYLATE 50 MG/1
50 CAPSULE ORAL EVERY MORNING
Qty: 30 CAPSULE | Refills: 0 | Status: SHIPPED | OUTPATIENT
Start: 2024-06-10

## 2024-07-16 DIAGNOSIS — F90.2 ATTENTION DEFICIT HYPERACTIVITY DISORDER (ADHD), COMBINED TYPE: ICD-10-CM

## 2024-07-18 ENCOUNTER — OFFICE VISIT (OUTPATIENT)
Dept: URGENT CARE | Facility: CLINIC | Age: 59
End: 2024-07-18
Payer: COMMERCIAL

## 2024-07-18 VITALS
DIASTOLIC BLOOD PRESSURE: 75 MMHG | RESPIRATION RATE: 16 BRPM | BODY MASS INDEX: 20.96 KG/M2 | TEMPERATURE: 98 F | SYSTOLIC BLOOD PRESSURE: 110 MMHG | OXYGEN SATURATION: 98 % | HEIGHT: 61 IN | WEIGHT: 111 LBS | HEART RATE: 64 BPM

## 2024-07-18 DIAGNOSIS — M25.512 ACUTE PAIN OF LEFT SHOULDER: Primary | ICD-10-CM

## 2024-07-18 DIAGNOSIS — R20.2 NUMBNESS AND TINGLING: ICD-10-CM

## 2024-07-18 DIAGNOSIS — Z98.890 HISTORY OF ROTATOR CUFF SURGERY: ICD-10-CM

## 2024-07-18 DIAGNOSIS — R20.0 NUMBNESS AND TINGLING: ICD-10-CM

## 2024-07-18 PROCEDURE — 99213 OFFICE O/P EST LOW 20 MIN: CPT | Mod: S$GLB,,, | Performed by: STUDENT IN AN ORGANIZED HEALTH CARE EDUCATION/TRAINING PROGRAM

## 2024-07-18 RX ORDER — LISDEXAMFETAMINE DIMESYLATE 50 MG/1
50 CAPSULE ORAL EVERY MORNING
Qty: 30 CAPSULE | Refills: 0 | Status: SHIPPED | OUTPATIENT
Start: 2024-07-18

## 2024-07-18 NOTE — PROGRESS NOTES
Subjective:      Patient ID: Isabella Santana is a 58 y.o. female.    Chief Complaint: No chief complaint on file.    HPI  ROS  Objective:     Physical Exam   Assessment:      No diagnosis found.  Plan:                 No follow-ups on file.
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
## 2024-07-18 NOTE — PATIENT INSTRUCTIONS
Please follow up with your primary care provider within 2-5 days if your signs and symptoms have not resolved or worsen.     If your condition worsens or fails to improve we recommend that you receive another evaluation at the emergency room immediately or contact your primary medical clinic to discuss your concerns.   You must understand that you have received an Urgent Care treatment only and that you may be released before all of your medical problems are known or treated. You, the patient, will arrange for follow up care as instructed.          Occupational medicine department will reach out to you. Please do no go to work until you have made contact with them and they have evaluated you. Their phone number is [phone]. Please be sure to get your xray done tonight.

## 2024-07-18 NOTE — LETTER
[place] [facility] Urgent [facility] Occupational Health  [facility] [facility] [place] [address]  Phone: [phone]  Fax: [phone]  [facility] [employer] [facility]: [id number]    Pt Name: Isabella Santana  Injury Date:     Employee ID:  Date of First Treatment: 07/19/2024   Company: Networked reference to record [employer] [employer]      Appointment Time: 05:45 PM Arrived: ***   Provider: Edith Kent MD Time Out:***     Office Treatment:   1. Acute pain of left shoulder    2. History of rotator cuff surgery    3. Numbness and tingling                     Return Appointment: 7/19/2024 at ***

## 2024-07-18 NOTE — LETTER
[place] [facility]gent [facility] Occupational [facility]  [facility] [facility] [place] [address]  Phone: [phone]  Fax: [phone]  [facility]: [id number]    Pt Name: Isabella Santana  Injury Date:  07/18/2024   Employee ID:  Date of First Treatment: 07/18/2024   Company: [employer] reference to record [employer] [employer] [employer]      Appointment Time: 05:45 PM Arrived: 6.08 pm    Provider: Edith Kent MD Time Out:7:03 pm     Office Treatment:   1. Acute pain of left shoulder    2. History of rotator cuff surgery    3. Numbness and tingling       Occupational medicine department will reach out to you. Please do not go to work until    you have made contact with them and they have evaluated you. They can be reached at [phone].          Return Appointment: 7/25/2024       RHEUMATOLOGY OFFICE VISIT    Chief Complaint   Patient presents with   • Office Visit     Seropositive rheumatoid arthritis        HISTORY OF PRESENT ILLNESS:  This is a pleasant 78 year old White male who is here for follow up. He is accompanied by his wife, Chantal.     He continues with hydroxychloroquine without difficulty. Eye examination is up-to-date without ocular toxicity (2/2023). No new visual symptoms.     I last saw the patient on 12/8/22. I performed a left knee aspiration which resulted in a dry tap. He saw sports medicine 12/12/22 and had an aspiration of his left knee, in which 23 cc of cloudy fluid with a clump was aspirated. He had a cortisone injection a few weeks later. He states he felt immediate relief, but it took about 2 weeks until he was pain free. He states the pain returned in February, then resolved.     A new concern of his is right hand pain that developed within the last 3-4 months. He states the pain primarily occurs at night and is located along dorsal and palmar side of hand. He states he was told to avoid ibuprofen due to cardiac history, but when he takes it the pain resolves. He also states that his right should is occasionally painful and stiff in the morning upon waking up. He typically sleeps on his left side.    From a rheumatologic standpoint, the patient has had no photosensitivity, malar rash, eye pain or redness, sicca syndrome, recurrent oral/nasal/genital ulcers, dysphagia, pleuritic chest pain, dyspnea on exertion, chronic cough, GERD, constipation, diarrhea, skin thickening, alopecia, or Raynaud's phenomenon.  Denies any history of kidney disease or serositis in the past.  No history of psychosis, delirium or seizures.    PAST MEDICAL HISTORY:  Past Medical History:   Diagnosis Date   • Abscess of salivary gland 09/2009    with stones   • Benign neoplasm of colon 04/16/2009    tubular adenoma, hyperplastic polyps, mucosal tags   • Carotid artery disease (CMD)     70%  L ICA  Dr. Pat following   • Cataract 2011    Surgery performed for corretion.   • Chronic airway obstruction, not elsewhere classified 02/12/2007   • Colon Polyp 1980   • Coronary atherosclerosis of native coronary artery 09/2009    Dr. 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 Juancarlos Leigh   • Pseudogout involving multiple joints     Wrists, knees   • Pure hypercholesterolemia    • S/P left carotid endarterectomy 04/21/2015   • Unspecified essential hypertension    • Unspecified hemorrhoids without mention of complication 04/16/2009    internal   • Urticaria, dermatographic 07/17/2018   • Wrist fracture 01/2019    left       PAST SURGICAL HISTORY:  Past Surgical History:   Procedure Laterality Date   • Cardiac catherization  04/06/2015   • Carpal tunnel release Left 02/20/2017   • Cataract extraction w/  intraocular lens implant Right 11/2016   • Colonoscopy  05/2014    1 TA,   due 2019   • Colonoscopy  03/02/2022    tics.   no polyp.   No f/u - age   • Colonoscopy remove lesions by snare  04/16/2009    Dr. Lieberman   • Colonoscopy w biopsy  04/16/2009    Dr. Lieberman   • Coronary angiogram - cv  09/25/2009   • Coronary artery bypass graft  10/01/2009    CABG at [facility],   4 vessel   • Ct angio neck  03/03/2015   • Echocardiogram  09/23/2009   • Endarterectomy Left 04/2015    Carotid endarterectomy, open   • Esophagogastroduodenoscopy transoral flex w/bx single or mult  04/16/2009    Dr. Lieberman   • Forearm/wrist  surgery unlisted  1984    fracture right wrist   • Past surgical history  1950    PSH of cyst removed from brain   • Past surgical history      PSH of meningitis x 4   • Prophylaxis of retinal detachment without drainage photocoagulation Right 01/19/2022    in [place]   • Removal salivary stone uncomplic  2009    Tabatha,  I+D   • Stress test  03/05/2012   • Us carotid complete bilat  02/10/2015       ALLERGIES:  Allergies as of 06/08/2023   • (No Known Allergies)       MEDICATIONS:  Current Outpatient Medications   Medication Sig Dispense Refill   • hydroxychloroquine (PLAQUENIL) 200 MG tablet Take 1 tablet by mouth daily. 90 tablet 0   • diclofenac (VOLTAREN) 1 % gel Apply 2 g topically 4 times daily as needed (joint pain). 150 g 1   • pantoprazole (PROTONIX) 40 MG tablet Take 1 tablet by mouth daily (before lunch). 60 tablet 0   • lisinopril (ZESTRIL) 5 MG tablet TAKE 1 TABLET BY MOUTH  DAILY 90 tablet 0   • metoPROLOL tartrate (LOPRESSOR) 25 MG tablet Take 0.5 tablets by mouth in the morning and 0.5 tablets in the evening. 180 tablet 1   • simvastatin (ZOCOR) 40 MG tablet Take 1 tablet by mouth nightly. 90 tablet 3   • nitroGLYCERIN (NITROSTAT) 0.4 MG sublingual tablet Place 1 tablet under the tongue every 5 minutes as needed (PRN chest pain). 25 tablet 11   • betamethasone dipropionate augmented (DIPROLENE AF) 0.05 % cream Apply topically 2 times daily. 30 g 0   • Cyanocobalamin (VITAMIN B 12 PO) Take 200 mg by mouth daily.     • levocetirizine (XYZAL) 5 MG tablet Take 1 tablet by mouth every evening. 90 tablet 1   • cholecalciferol (VITAMIN D3) 1000 UNITS tablet Take 2,000 Units by mouth daily.     • aspirin 81 MG chewable tablet Chew 81 mg by mouth daily.     • Omega-3 Fatty Acids (FISH OIL) 1200 MG capsule Take 3 capsules by mouth daily.        No current facility-administered medications for this visit.       FAMILY HISTORY:  Family History   Problem Relation Age of Onset   • Heart Father         CABG at 58,   copd   • COPD Father    • Heart Mother         IHSS, CHF  76   • Heart disease Brother         CABG at 35   • NEGATIVE FAMILY HX OF Other            • Genitourinary Other         ACP, Cousin at age 57       SOCIAL HISTORY:  Social History     Tobacco Use   • Smoking status: Former     Current packs/day: 0.00     Average packs/day: 1.5 packs/day for 40.0 years (60.0 pk-yrs)     Types: Cigarettes     Start date: 3/31/1969     Quit date: 3/31/2009     Years since quittin.1   • Smokeless tobacco: Never   Substance Use Topics   • Alcohol use: Yes     Alcohol/week: 0.0 standard drinks of alcohol     Comment: 1 time year   • Drug use: No       REVIEW OF SYSTEMS:  See history of present illness, otherwise, all negative.  I reviewed the form filled out by the patient.     PHYSICAL EXAMINATION:  General:  Alert, awake, oriented x3, not in acute distress.  Pleasant and appears stated age.  Vital Signs:  Blood pressure (!) 153/63, pulse (!) 48, height 5' 6\" (1.676 m), weight 71.7 kg (158 lb).  Skin:  No palpable purpura, psoriasis or livedo on the upper and lower extremities.    HEENT:  Pink palpebral conjunctivae, anicteric sclerae.  No malar rash, oronasal ulcerations or parotid gland enlargement with moist buccal mucosa.  No heliotrope rash.     Neck:  Supple, no carotid bruit, no lymphadenopathy with no palpable mass.   Lungs:  No rales or wheezing, resonant to percussion. Normal respiratory effort.   Heart:  No gallop, significant murmur or friction rub.   Abdomen:  Soft, positive bowel sounds, no tenderness. No hepatosplenomegaly or masses appreciated.   Extremities:  No bipedal edema, cyanosis or clubbing with palpable pedal pulses.  No sclerodactyly, periungual erythema, nail pitting, subcutaneous nodules, fat pad atrophy or digital ulcerations.  No Gottron's papule or telangiectasia.  No bilateral puffy fingers.   Neurological:  No focal muscle weakness or sensory deficits.     Physical Exam    Musculoskeletal:       Joint Exam 06/08/2023     All documented joints were normal      12/8/2022 6/8/2023   BRANCH-28 (ESR) -- --   BRANCH-28 (CRP) -- --   Tender (BRANCH-28) 1 / 28  0 / 28    Swollen (BRANCH-28) 1 / 28 0 / 28    Provider Global 4 mm 0 mm   Patient Global -- 0 mm   ESR -- --   CRP -- --     CDAI score 0        LABS:  Creatinine (mg/dL)   Date Value   03/31/2023 0.67   02/01/2022 0.69   02/15/2021 0.80     Lab Results   Component Value Date    SODIUM 142 03/31/2023    POTASSIUM 5.0 03/31/2023    CHLORIDE 110 03/31/2023    CO2 26 03/31/2023    CREATININE 0.67 03/31/2023    TOTPROTEIN 6.6 02/01/2022    ALBUMIN 3.2 (L) 02/01/2022    CALCIUM 9.4 03/31/2023    BILIRUBIN 0.4 02/01/2022    ALKPT 60 02/01/2022    GPT 34 02/01/2022    BCRAT 20 08/06/2019    GLOB 3.2 02/15/2021    GFRNA 90 08/06/2019    GFRA >90 08/06/2019     Lab Results   Component Value Date    SODIUM 142 03/31/2023    POTASSIUM 5.0 03/31/2023    CHLORIDE 110 03/31/2023    CO2 26 03/31/2023    BUN 17 03/31/2023    CREATININE 0.67 03/31/2023    GLUCOSE 100 (H) 03/31/2023     IMAGING:  No new/relevant imaging since last visit.    ASSESSMENT & PLAN  Jose was seen today for office visit and rheumatoid arthritis.    Diagnoses and all orders for this visit:    Seropositive rheumatoid arthritis (CMS/[facility])  Previous Dr. Lora patient- diagnosed in 2015, predominately affecting PIPs and MCPs, low positive anti-CCP of 37, negative rheumatoid factor. Currently on hydroxychloroquine 200 mg daily with presumably good control of symptoms. Consider increasing the dose if morning stiffness becomes prolonged and he has increased joint pain.    - Continue hydroxychloroquine 200 mg daily with yearly eye exam.  -     hydroxychloroquine (PLAQUENIL) 200 MG tablet; Take 1 tablet by mouth daily.    - Recommend to try diclofenac gel to apply to right hand and right shoulder as needed.        -     diclofenac (VOLTAREN) 1 % gel; Apply 2 g topically 4 times daily as needed  (joint pain).    Long-term use of hydroxychloroquine  Obtain yearly eye exam.    Generalized osteoarthritis  - Continue with sports medicine as needed.    All his questions were addressed. Return in about 6 months (around 12/8/2023). The patient was advised to call back should any questions or concerns arise in the interim.  I will be in contact with the patient via patient portal and/or phone regarding their results - if any finding requires closer follow-up or further testing, we will call the patient.    Frida Carver MD, MSc  Rheumatologist  [facility]    On 6/8/2023, I, Phuong Whitfield MA scribed the services personally performed by Frida Carver MD  The documentation recorded by the scribe accurately and completely reflects the service(s) I personally performed and the decisions made by me.

## 2024-07-22 ENCOUNTER — TELEPHONE (OUTPATIENT)
Dept: URGENT CARE | Facility: CLINIC | Age: 59
End: 2024-07-22
Payer: COMMERCIAL

## 2024-07-22 NOTE — TELEPHONE ENCOUNTER
Due to previous injury/surgery to shoulder, the patient has been referred to Dr. Medina.  Pt has been notified to call his office, speak to Jennifer and an appointment will be scheduled.  Pt verbalized understanding.      Kristina Flanagan, pts direct supervisor has been notified of the plan.

## 2024-07-24 ENCOUNTER — OFFICE VISIT (OUTPATIENT)
Dept: URGENT CARE | Facility: CLINIC | Age: 59
End: 2024-07-24
Payer: COMMERCIAL

## 2024-07-24 VITALS
HEART RATE: 86 BPM | WEIGHT: 111 LBS | RESPIRATION RATE: 16 BRPM | BODY MASS INDEX: 20.96 KG/M2 | TEMPERATURE: 98 F | DIASTOLIC BLOOD PRESSURE: 68 MMHG | OXYGEN SATURATION: 98 % | HEIGHT: 61 IN | SYSTOLIC BLOOD PRESSURE: 97 MMHG
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
## 2024-07-24 DIAGNOSIS — Z98.890 HISTORY OF ROTATOR CUFF SURGERY: ICD-10-CM

## 2024-07-24 DIAGNOSIS — Z02.6 ENCOUNTER RELATED TO WORKER'S COMPENSATION CLAIM: ICD-10-CM

## 2024-07-24 DIAGNOSIS — S49.92XD INJURY OF LEFT SHOULDER AND UPPER ARM, SUBSEQUENT ENCOUNTER: ICD-10-CM

## 2024-07-24 DIAGNOSIS — Z56.89: ICD-10-CM

## 2024-07-24 DIAGNOSIS — Z98.890 HISTORY OF ARTHROSCOPY OF LEFT SHOULDER: ICD-10-CM

## 2024-07-24 DIAGNOSIS — Z02.6 ENCOUNTER FOR ASSESSMENT OF WORK-RELATED CAUSATION OF INJURY: ICD-10-CM

## 2024-07-24 DIAGNOSIS — S40.012D CONTUSION OF LEFT SHOULDER, SUBSEQUENT ENCOUNTER: ICD-10-CM

## 2024-07-24 DIAGNOSIS — Z04.2 ENCOUNTER FOR EXAMINATION AND OBSERVATION FOLLOWING WORK ACCIDENT: Primary | ICD-10-CM

## 2024-07-24 PROCEDURE — 99213 OFFICE O/P EST LOW 20 MIN: CPT | Mod: S$GLB,,, | Performed by: NURSE PRACTITIONER

## 2024-07-24 NOTE — PROGRESS NOTES
Subjective:      Patient ID: Isabella Santana is a 58 y.o. female.    Chief Complaint: workmans comp    Pt is in for a revisit and work release for the incident that happened on 7/18, she did do her shoulder xray and would like a copy.      Was referred to ortho, Dr Medina- appointment is pending.    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   Neurological:  Negative for numbness and tingling.     Objective:     Physical Exam   Assessment:      1. Fit to return to work with restrictions    2. Injury of left shoulder and upper arm, subsequent encounter    3. Contusion of left shoulder, subsequent encounter    4. Work related injury    5. Encounter for assessment of work-related causation of injury    6. Encounter for examination and observation following work accident      Plan:          Patient Instructions: Attention not to aggravate affected area, Referral to specialist to be scheduled, once authorized   Restrictions: No above the shoulder/overhead work, Limited use of left hand and arm (No lifting/pushing/pulling more than 5 lbs)  No follow-ups on file.

## 2024-07-24 NOTE — PROGRESS NOTES
Subjective:      Patient ID: Isabella Santana is a 58 y.o. female.    Chief Complaint: workmans comp    Pt is in for a revisit and work release for the incident that happened on 7/18, she did do her shoulder xray and would like a copy.      Was referred to ortho, Dr Medina- appointment date/time is pending.            Musculoskeletal:  Positive for pain and abnormal ROM of joint.   Neurological:  Negative for numbness and tingling.   Objective:     Physical Exam  Vitals and nursing note reviewed.   Constitutional:       General: She is not in acute distress.     Appearance: Normal appearance. She is not ill-appearing, toxic-appearing or diaphoretic.   Musculoskeletal:      Right shoulder: Normal.   Neurological:      Mental Status: She is alert.   Psychiatric:         Mood and Affect: Mood normal.         Behavior: Behavior normal.         Thought Content: Thought content normal.         Judgment: Judgment normal.      Assessment:      1. Fit to return to work with restrictions    2. Injury of left shoulder and upper arm, subsequent encounter    3. Contusion of left shoulder, subsequent encounter    4. Work related injury    5. Encounter for assessment of work-related causation of injury    6. Encounter for examination and observation following work accident      Plan:          Patient Instructions: Attention not to aggravate affected area, Referral to specialist to be scheduled, once authorized   Restrictions: No above the shoulder/overhead work, Limited use of left hand and arm (No lifting/pushing/pulling more than 5 lbs)  No follow-ups on file.    Patient Instructions   You may return to work with light duty restrictions starting today, 7/24/2024 and extending through (TBD). Please update this office when you have been scheduled an appointment with Dr Medina. Once your ortho appointment has been scheduled, you will be assigned a return visit follow up appointment at employee health to discuss your treatment plan  going forward.    Please ask Dr Median's office to send all correspondence to this office including visit notes, imaging results, orders, referrals, etc.    A copy of your work status has been given to you and has been faxed to [employer].    Light duty work has been assigned at this time. Please avoid any activities that which may aggravate your Left shoulder injury.
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
## 2024-07-24 NOTE — PROGRESS NOTES
"Subjective:      Patient ID: Isabella Santana is a 58 y.o. female.    Vitals:  height is 5' 1" (1.549 m) and weight is 50.3 kg (111 lb). Her temperature is 98 °F (36.7 °C). Her blood pressure is 97/68 and her pulse is 86. Her respiration is 16 and oxygen saturation is 98%.     Chief Complaint: workmans comp    Pt is in for a revisit and work release for the incident that happened on 7/18, she did do her shoulder xray and would like a copy.     Was referred to ortho, Dr Medina- appointment is pending.          ROS   Objective:     Physical Exam    Assessment:     1. Fit to return to work with restrictions    2. Injury of left shoulder and upper arm, subsequent encounter    3. Contusion of left shoulder, subsequent encounter    4. Work related injury    5. Encounter for assessment of work-related causation of injury    6. Encounter for examination and observation following work accident        Plan:       Fit to return to work with restrictions    Injury of left shoulder and upper arm, subsequent encounter    Contusion of left shoulder, subsequent encounter    Work related injury    Encounter for assessment of work-related causation of injury    Encounter for examination and observation following work accident          Medical Decision Making:   History:   Old Records Summarized: records from clinic visits.       <> Summary of Records: Reviewed prior relevant encounter notes.    Urgent Care Management:  Discussed plan of care with employee who is in agreement with returning to light duty work with restrictions to avoid lifting/pushing/pulling with LUE.       Patient Instructions   You may return to work with light duty restrictions starting today, 7/24/2024 and extending through (TBD). Please update this office when you have been scheduled an appointment with Dr Medina. Once your ortho appointment has been scheduled, you will be assigned a return visit follow up appointment at [facility] to discuss your treatment " plan going forward.    Please ask Dr Medina's office to send all correspondence to this office including visit notes, imaging results, orders, referrals, etc.    A copy of your work status has been given to you and has been faxed to [employer].    Light duty work has been assigned at this time. Please avoid any activities that which may aggravate your Left shoulder injury.

## 2024-07-24 NOTE — PROGRESS NOTES
Subjective:      Patient ID: Isabella Santana is a 58 y.o. female.    Chief Complaint: workmans comp    Pt is in for a revisit and work release for the incident that happened on 7/18, she did do her shoulder xray and would like a copy.      Was referred to ortho, Dr Medina- appointment is pending.    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   Neurological:  Negative for numbness and tingling.     Objective:     Physical Exam   Assessment:      1. Fit to return to work with restrictions    2. Injury of left shoulder and upper arm, subsequent encounter    3. Contusion of left shoulder, subsequent encounter    4. Work related injury    5. Encounter for assessment of work-related causation of injury    6. Encounter for examination and observation following work accident      Plan:          Patient Instructions: Attention not to aggravate affected area, Referral to specialist to be scheduled, once authorized   Restrictions: No above the shoulder/overhead work, Limited use of left hand and arm (No lifting/pushing/pulling more than 5 lbs)  No follow-ups on file.

## 2024-07-24 NOTE — PROGRESS NOTES
Subjective:      Patient ID: Isabella Santana is a 58 y.o. female.    Chief Complaint: workmans comp    Pt is in for a revisit and work release for the incident that happened on 7/18, she did do her shoulder xray and would like a copy.      Was referred to ortho, Dr Medina- appointment is pending.    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   Neurological:  Negative for numbness and tingling.     Objective:     Physical Exam   Assessment:      1. Fit to return to work with restrictions    2. Injury of left shoulder and upper arm, subsequent encounter    3. Contusion of left shoulder, subsequent encounter    4. Work related injury    5. Encounter for assessment of work-related causation of injury    6. Encounter for examination and observation following work accident      Plan:          Patient Instructions: Attention not to aggravate affected area, Referral to specialist to be scheduled, once authorized   Restrictions: No above the shoulder/overhead work, Limited use of left hand and arm (No lifting/pushing/pulling more than 5 lbs)  No follow-ups on file.

## 2024-07-24 NOTE — PATIENT INSTRUCTIONS
You may return to work with light duty restrictions starting today, 7/24/2024 and extending through (TBD). Please update this office when you have been scheduled an appointment with Dr Medina. Once your ortho appointment has been scheduled, you will be assigned a return visit follow up appointment at [facility] to discuss your treatment plan going forward.    Please ask Dr Medina's office to send all correspondence to this office including visit notes, imaging results, orders, referrals, etc.    A copy of your work status has been given to you and has been faxed to [employer].    Light duty work has been assigned at this time. Please avoid any activities that which may aggravate your Left shoulder injury.     no

## 2024-07-24 NOTE — LETTER
[facility] [place] [facility]  [address] [place] [address]  Phone: [phone]  Fax: [phone]  [employer]: [phone]    Pt Name: Isabella Santana  Injury Date:     Employee ID:  Date of First Treatment: 07/24/2024   Company: [employer] reference to record [id number]      Appointment Time: 08:00 AM Arrived: 08:15   Provider: BULMARO LUIS NP Time Out:09:52     Office Treatment:   1. Fit to return to work with restrictions    2. Injury of left shoulder and upper arm, subsequent encounter    3. Contusion of left shoulder, subsequent encounter    4. Work related injury    5. Encounter for assessment of work-related causation of injury    6. Encounter for examination and observation following work accident          Patient Instructions: Attention not to aggravate affected area, Referral to specialist to be scheduled, once authorized    Restrictions: No above the shoulder/overhead work, Limited use of left hand and arm (No lifting/pushing/pulling more than 5 lbs)     Return Appointment: TBD (will schedule f/u once ortho appointment has been scheduled.

## 2024-07-30 ENCOUNTER — OFFICE VISIT (OUTPATIENT)
Dept: PRIMARY CARE CLINIC | Facility: CLINIC | Age: 59
End: 2024-07-30
Payer: COMMERCIAL

## 2024-07-30 VITALS
TEMPERATURE: 98 F | DIASTOLIC BLOOD PRESSURE: 80 MMHG | BODY MASS INDEX: 21.24 KG/M2 | SYSTOLIC BLOOD PRESSURE: 126 MMHG | OXYGEN SATURATION: 98 % | HEART RATE: 79 BPM | WEIGHT: 112.5 LBS | RESPIRATION RATE: 18 BRPM | HEIGHT: 61 IN

## 2024-07-30 DIAGNOSIS — F41.1 GENERALIZED ANXIETY DISORDER: Primary | ICD-10-CM

## 2024-07-30 DIAGNOSIS — Z12.31 BREAST CANCER SCREENING BY MAMMOGRAM: ICD-10-CM

## 2024-07-30 DIAGNOSIS — E78.2 MIXED HYPERLIPIDEMIA: ICD-10-CM

## 2024-07-30 DIAGNOSIS — N95.9 MENOPAUSAL DISORDER: ICD-10-CM

## 2024-07-30 DIAGNOSIS — F90.2 ATTENTION DEFICIT HYPERACTIVITY DISORDER (ADHD), COMBINED TYPE: ICD-10-CM

## 2024-07-30 PROCEDURE — 99214 OFFICE O/P EST MOD 30 MIN: CPT | Mod: ,,, | Performed by: NURSE PRACTITIONER
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
## 2024-07-30 NOTE — PATIENT INSTRUCTIONS
"RTC in 3 months for F/U or sooner if needed.    Keep appts with specialists as scheduled.    Patient Education       Low Cholesterol, Saturated Fat, and Trans Fat Diet   About this topic   Cholesterol, saturated fat, and trans fat are in many foods. These may raise your blood cholesterol levels. If your cholesterol is too high, this can cause health problems in your heart, liver, kidneys, and even your eyes. The key to lowering your risk of heart problems is to lower your bad fat intake.  Saturated fats and trans fats are the bad fats. These fats clog your arteries and raise your bad cholesterol. Saturated fats and trans fats are solid fats at room temperature. Saturated fats are animal fats. Trans fats are manmade fats. They add flavor to a lot of packaged foods. Staying away from saturated and trans fats will help your heart.  When you do eat foods with fat, make sure they have the good fats. Monounsaturated and polyunsaturated fats are good fats. These fats help raise your good cholesterol and protect your heart.  General   How to Lower Fat and Cholesterol in Your Diet   Read the labels of the foods you buy from the market to find out how much fat is present. Under 5% of total fat on a label means it is "low fat". Over 20% of total fat on a label means it is high fat.  Eat high fiber foods, like soluble fiber. This type of fiber helps lower cholesterol in the body. Choose oatmeal, fruits (like apples), beans, and nuts to get the most soluble fiber.  Eat foods high in omega-3 fatty acids like sha seeds, walnuts, salmon, tuna, trout, herring, flaxseed, and soybeans. These foods help keep the heart healthy.  Limit your bad fat and oil intake.  Stay away from butter, stick margarine, shortening, lard, and palm and coconut oil. Pick plant-based spreads instead.  Limit mayonnaise, salad dressings, gravies, and sauces, unless it is made from low-fat ingredients.  Limit chocolate.  Do not eat high-fat processed foods " like hot dogs, luna, sausage, ham and other luncheon meats high in fat, and some frozen foods. Pick fish, chicken, turkey, and lean meats instead.  Eat more dried beans, lentils, and tofu to get your protein.  Do not eat organ meats, like liver.  Choose nonfat or low-fat milk, yogurt, and cheese.  Use light or fat-free cream cheese and sour cream.  Eat lots of fruits and vegetables.  Pick whole grain breads, cereals, pastas, and rice.  Do not eat snacks that are high in fats like granola, cookies, pies, pastries, doughnuts, and croissants.  Stay away from deep fried foods.  Help When Cooking   Remove the fat portion of meats and the skin from poultry before cooking.  Bake, broil, grill, poach, or roast poultry, fish, and lean meats.  Drain and throw away the fat that drains out of meat as you cook it.  Try to add little or no fat to foods.  Use olive or canola oil for cooking or baking.  Steam your vegetables.  Use herbs or no-oil marinades to flavor foods.         Who should use this diet?   This diet is for people who are at high risk of getting health problems like heart disease, high blood pressure, diabetes, and others. This diet is also good for all people to follow to keep your heart healthy.  What foods are good to eat?   Foods with good fats are:  Canola, peanut, and olive oil  Safflower, soybean, and corn oil  Walnuts, almonds, cashews, and peanuts  Pumpkin and sunflower seeds  Dixon and tuna  Tofu  Soymilk  Avocado  What foods should be limited or avoided?   Stay away from these types of foods that have saturated fats:  Whole fat dairy products like cheese, ice cream, whole milk, and cream  Palm and coconut oils  High-fat meats like beef, lamb, poultry with the skin, luna, and sausage  Butter and lard  Stay away from these types of foods that may have trans fat:  Cookies, cakes, candy, doughnuts, baked goods, muffins, pizza dough, and pie crusts that are packaged  Fried foods  Frozen dinners  Chips and  crackers  Microwave popcorn  Stick margarine and vegetable shortenings  Helpful tips   To help stay away from saturated fat:  Pick lean cuts of meat  Take the skin off chicken and turkey or pick skinless  Pick low-fat cheese, milk, and ice cream  Use liquid oils when cooking and baking, such as olive oil and canola oil  To help stay away from trans fat:  Look at your labels. Choose foods with 0% trans fat. Read the ingredient list. Avoid foods with partially hydrogenated oil in the ingredient list. This means there is trans fat in the product.  Where can I learn more?   American Heart Association   http://www.heart.org/HEARTORG/Conditions/Cholesterol/PreventionTreatmentofHighCholesterol/Know-Your-Fats_[facility]_305628_Article.jsp   Last Reviewed Date   2021-10-05  Consumer Information Use and Disclaimer   This information is not specific medical advice and does not replace information you receive from your health care provider. This is only a brief summary of general information. It does NOT include all information about conditions, illnesses, injuries, tests, procedures, treatments, therapies, discharge instructions or life-style choices that may apply to you. You must talk with your health care provider for complete information about your health and treatment options. This information should not be used to decide whether or not to accept your health care providers advice, instructions or recommendations. Only your health care provider has the knowledge and training to provide advice that is right for you.   Copyright   Copyright © 2021 [employer] and its affiliates and/or licensors. All rights reserved.      Patient Education       Anxiety Discharge Instructions, Adult   About this topic   Anxiety can cause you to feel very worried. It can also cause physical symptoms like chest pain, stomach aches, or trouble sleeping. While mild anxiety is a normal response to stress, it can cause you problems in your everyday  life. You may need follow-up care to help manage your anxiety. Anxiety happens in many forms, like:  Being scared all the time that something bad is going to happen. This is generalized anxiety.  Strong bursts of fear where your body has signs that may feel like a heart attack. This is called a panic attack.  Upsetting thoughts that happen often. There is a need to repeat doing certain things to help get rid of the anxiety caused by these thoughts. The thoughts or actions may be about checking on things, touching things, or worry about germs. This is an obsessive-compulsive disorder.  Strong fear of an object, place, or condition. This is a phobia.  Fear that others think bad things about you or being put down by other people. This is social anxiety.  Nightmares, flashbacks, staying away from people, or having panic attacks when reminded of a shocking or hurtful time or place from the past. This is post-traumatic stress.  Anxiety disorder may be treated in many ways. Some kinds of treatment have you talk about your beliefs, fears, and worries. You may learn how certain thoughts or feelings can raise anxiety. You may also learn what steps to take to lower anxiety. Other kinds of treatment may have you look back on a hurtful event, sad memory, or something you are afraid of. The doctor will help you deal with the feelings that you may have. You may learn ways to cope with unwanted events or thoughts by looking at your fears in a way that feels safe.  What care is needed at home?   Ask your doctor what you need to do when you go home. Make sure you ask questions if you do not understand what the doctor says.  Set a time to talk with a counselor about your worries and feelings. This can help you with your anxiety.  Take care to follow all instructions when you take your medicines.  Limit alcohol and caffeine.  Learn ways to manage stress. Relaxation methods like reflection, deep breathing, and muscle relaxation may be  helpful. Things like yoga, exercise, and mike chi are also good.  Talk about your feelings with family members and friends you trust. Talk to someone who can help you see how your thoughts at certain times may raise your anxiety.     What follow-up care is needed?   Your doctor may ask you to make visits to the office to check on your progress. Be sure to keep these visits.  What drugs may be needed?   The doctor may order drugs to help the physical signs of anxiety. Make sure that you take the drugs as taught to you by the doctor. Talk with your doctor about any side effects and ask how long you should take the drug.  Will physical activity be limited?   You may take part in physical activities. Some people are limited because of their anxiety or fear. Talk with your doctor about the right amount of activity for you.  What changes to diet are needed?   Eat a variety of healthy foods and limit drinks with caffeine. You should avoid alcohol, energy drinks, and over-the-counter stimulants.  What problems could happen?   If your anxiety is not treated, it can result in:  Staying away from work or social events  Not being able to do everyday tasks  Keeping away from family and friends  What can be done to prevent this health problem?   Learn what events, people, or things upset you. Limit your contact with them.  Talk about your feelings. Talk to someone who can help you see how your thoughts at certain times may raise your anxiety.  Seek support from your friends and family. Find someone who calms you down. Ask if you can call them when you are getting anxious.  When do I need to call the doctor?   You feel you may harm yourself or someone else.  You can also call a mental health hotline for help.  You have any physical symptoms, such as chest pain, trouble breathing, or severe belly pain, that could be a sign of a serious problem.  If you are short of breath.  If you do not feel like you can be alone.  Teach Back:  Helping You Understand   The Teach Back Method helps you understand the information we are giving you. After you talk with the staff, tell them in your own words what you learned. This helps to make sure the staff has described each thing clearly. It also helps to explain things that may have been confusing. Before going home, make sure you can do these:  I can tell you about my condition and the drugs I need to take.  I can tell you what may help lower my anxiety.  I can tell you what I will do if it is hard to breathe or I have chest pain.  I can tell you what I will do if I do not feel safe or cannot be alone.  Where can I learn more?   [facility]  https://www.gricelda.org/Learn-More/Mental-Health-Conditions/Anxiety-Disorders   National Health Service  https://www.nhs.uk/conditions/generalised-anxiety-disorder/symptoms/   [place] [facility] ? [facility]  https://www.sudha.nih.gov/health/relieving-stress-anxiety-zpwevocpw-coklfefdss-mbiahzcyrh   [place] of [facility]  http://www.nimh.nih.gov/health/publications/anxiety-disorders/complete-index.shtml   Last Reviewed Date   2021-06-08  Consumer Information Use and Disclaimer   This information is not specific medical advice and does not replace information you receive from your health care provider. This is only a brief summary of general information. It does NOT include all information about conditions, illnesses, injuries, tests, procedures, treatments, therapies, discharge instructions or life-style choices that may apply to you. You must talk with your health care provider for complete information about your health and treatment options. This information should not be used to decide whether or not to accept your health care providers advice, instructions or recommendations. Only your health care provider has the knowledge and training to provide advice that is right for you.  Copyright   Copyright © 2021 [employer] and its affiliates and/or  licensors. All rights reserved.

## 2024-08-13 DIAGNOSIS — F90.2 ATTENTION DEFICIT HYPERACTIVITY DISORDER (ADHD), COMBINED TYPE: ICD-10-CM

## 2024-08-13 RX ORDER — LISDEXAMFETAMINE DIMESYLATE 50 MG/1
50 CAPSULE ORAL EVERY MORNING
Qty: 30 CAPSULE | Refills: 0 | Status: SHIPPED | OUTPATIENT
Start: 2024-08-13

## 2024-09-16 DIAGNOSIS — F90.2 ATTENTION DEFICIT HYPERACTIVITY DISORDER (ADHD), COMBINED TYPE: ICD-10-CM

## 2024-09-16 RX ORDER — LISDEXAMFETAMINE DIMESYLATE 50 MG/1
50 CAPSULE ORAL EVERY MORNING
Qty: 30 CAPSULE | Refills: 0 | Status: SHIPPED | OUTPATIENT
Start: 2024-09-16

## 2024-10-17 DIAGNOSIS — F90.2 ATTENTION DEFICIT HYPERACTIVITY DISORDER (ADHD), COMBINED TYPE: ICD-10-CM

## 2024-10-17 RX ORDER — LISDEXAMFETAMINE DIMESYLATE 50 MG/1
50 CAPSULE ORAL EVERY MORNING
Qty: 30 CAPSULE | Refills: 0 | Status: SHIPPED | OUTPATIENT
Start: 2024-10-17

## 2024-11-04 ENCOUNTER — OFFICE VISIT (OUTPATIENT)
Dept: PRIMARY CARE CLINIC | Facility: CLINIC | Age: 59
End: 2024-11-04
Payer: COMMERCIAL

## 2024-11-04 VITALS
DIASTOLIC BLOOD PRESSURE: 67 MMHG | HEIGHT: 61 IN | OXYGEN SATURATION: 97 % | SYSTOLIC BLOOD PRESSURE: 99 MMHG | WEIGHT: 110 LBS | HEART RATE: 96 BPM | TEMPERATURE: 98 F | RESPIRATION RATE: 18 BRPM | BODY MASS INDEX: 20.77 KG/M2

## 2024-11-04 DIAGNOSIS — N95.9 MENOPAUSAL DISORDER: ICD-10-CM

## 2024-11-04 DIAGNOSIS — F90.2 ATTENTION DEFICIT HYPERACTIVITY DISORDER (ADHD), COMBINED TYPE: ICD-10-CM

## 2024-11-04 DIAGNOSIS — F41.1 GENERALIZED ANXIETY DISORDER: Primary | ICD-10-CM

## 2024-11-04 DIAGNOSIS — E78.2 MIXED HYPERLIPIDEMIA: ICD-10-CM

## 2024-11-04 RX ORDER — CLONAZEPAM 1 MG/1
1 TABLET ORAL 2 TIMES DAILY PRN
Qty: 60 TABLET | Refills: 2 | Status: SHIPPED | OUTPATIENT
Start: 2024-11-04

## 2024-11-04 NOTE — PATIENT INSTRUCTIONS
"RTC in 3 months for F/U or sooner if needed.    Patient Education       Low Cholesterol, Saturated Fat, and Trans Fat Diet   About this topic   Cholesterol, saturated fat, and trans fat are in many foods. These may raise your blood cholesterol levels. If your cholesterol is too high, this can cause health problems in your heart, liver, kidneys, and even your eyes. The key to lowering your risk of heart problems is to lower your bad fat intake.  Saturated fats and trans fats are the bad fats. These fats clog your arteries and raise your bad cholesterol. Saturated fats and trans fats are solid fats at room temperature. Saturated fats are animal fats. Trans fats are manmade fats. They add flavor to a lot of packaged foods. Staying away from saturated and trans fats will help your heart.  When you do eat foods with fat, make sure they have the good fats. Monounsaturated and polyunsaturated fats are good fats. These fats help raise your good cholesterol and protect your heart.  General   How to Lower Fat and Cholesterol in Your Diet   Read the labels of the foods you buy from the market to find out how much fat is present. Under 5% of total fat on a label means it is "low fat". Over 20% of total fat on a label means it is high fat.  Eat high fiber foods, like soluble fiber. This type of fiber helps lower cholesterol in the body. Choose oatmeal, fruits (like apples), beans, and nuts to get the most soluble fiber.  Eat foods high in omega-3 fatty acids like sha seeds, walnuts, salmon, tuna, trout, herring, flaxseed, and soybeans. These foods help keep the heart healthy.  Limit your bad fat and oil intake.  Stay away from butter, stick margarine, shortening, lard, and palm and coconut oil. Pick plant-based spreads instead.  Limit mayonnaise, salad dressings, gravies, and sauces, unless it is made from low-fat ingredients.  Limit chocolate.  Do not eat high-fat processed foods like hot dogs, luna, sausage, ham and other " luncheon meats high in fat, and some frozen foods. Pick fish, chicken, turkey, and lean meats instead.  Eat more dried beans, lentils, and tofu to get your protein.  Do not eat organ meats, like liver.  Choose nonfat or low-fat milk, yogurt, and cheese.  Use light or fat-free cream cheese and sour cream.  Eat lots of fruits and vegetables.  Pick whole grain breads, cereals, pastas, and rice.  Do not eat snacks that are high in fats like granola, cookies, pies, pastries, doughnuts, and croissants.  Stay away from deep fried foods.  Help When Cooking   Remove the fat portion of meats and the skin from poultry before cooking.  Bake, broil, grill, poach, or roast poultry, fish, and lean meats.  Drain and throw away the fat that drains out of meat as you cook it.  Try to add little or no fat to foods.  Use olive or canola oil for cooking or baking.  Steam your vegetables.  Use herbs or no-oil marinades to flavor foods.         Who should use this diet?   This diet is for people who are at high risk of getting health problems like heart disease, high blood pressure, diabetes, and others. This diet is also good for all people to follow to keep your heart healthy.  What foods are good to eat?   Foods with good fats are:  Canola, peanut, and olive oil  Safflower, soybean, and corn oil  Walnuts, almonds, cashews, and peanuts  Pumpkin and sunflower seeds  Jeffrey and tuna  Tofu  Soymilk  Avocado  What foods should be limited or avoided?   Stay away from these types of foods that have saturated fats:  Whole fat dairy products like cheese, ice cream, whole milk, and cream  Palm and coconut oils  High-fat meats like beef, lamb, poultry with the skin, luna, and sausage  Butter and lard  Stay away from these types of foods that may have trans fat:  Cookies, cakes, candy, doughnuts, baked goods, muffins, pizza dough, and pie crusts that are packaged  Fried foods  Frozen dinners  Chips and crackers  Microwave popcorn  Stick margarine  and vegetable shortenings  Helpful tips   To help stay away from saturated fat:  Pick lean cuts of meat  Take the skin off chicken and turkey or pick skinless  Pick low-fat cheese, milk, and ice cream  Use liquid oils when cooking and baking, such as olive oil and canola oil  To help stay away from trans fat:  Look at your labels. Choose foods with 0% trans fat. Read the ingredient list. Avoid foods with partially hydrogenated oil in the ingredient list. This means there is trans fat in the product.  Where can I learn more?   American Heart Association   http://www.heart.org/HEARTORG/Conditions/Cholesterol/PreventionTreatmentofHighCholesterol/Know-Your-Fats_[facility]_305628_Article.jsp   Last Reviewed Date   2021-10-05  Consumer Information Use and Disclaimer   This information is not specific medical advice and does not replace information you receive from your health care provider. This is only a brief summary of general information. It does NOT include all information about conditions, illnesses, injuries, tests, procedures, treatments, therapies, discharge instructions or life-style choices that may apply to you. You must talk with your health care provider for complete information about your health and treatment options. This information should not be used to decide whether or not to accept your health care providers advice, instructions or recommendations. Only your health care provider has the knowledge and training to provide advice that is right for you.   Copyright   Copyright © 2021 [employer]. and its affiliates and/or licensors. All rights reserved.    Patient Education       Anxiety Discharge Instructions, Adult   About this topic   Anxiety can cause you to feel very worried. It can also cause physical symptoms like chest pain, stomach aches, or trouble sleeping. While mild anxiety is a normal response to stress, it can cause you problems in your everyday life. You may need follow-up care to help manage  your anxiety. Anxiety happens in many forms, like:  Being scared all the time that something bad is going to happen. This is generalized anxiety.  Strong bursts of fear where your body has signs that may feel like a heart attack. This is called a panic attack.  Upsetting thoughts that happen often. There is a need to repeat doing certain things to help get rid of the anxiety caused by these thoughts. The thoughts or actions may be about checking on things, touching things, or worry about germs. This is an obsessive-compulsive disorder.  Strong fear of an object, place, or condition. This is a phobia.  Fear that others think bad things about you or being put down by other people. This is social anxiety.  Nightmares, flashbacks, staying away from people, or having panic attacks when reminded of a shocking or hurtful time or place from the past. This is post-traumatic stress.  Anxiety disorder may be treated in many ways. Some kinds of treatment have you talk about your beliefs, fears, and worries. You may learn how certain thoughts or feelings can raise anxiety. You may also learn what steps to take to lower anxiety. Other kinds of treatment may have you look back on a hurtful event, sad memory, or something you are afraid of. The doctor will help you deal with the feelings that you may have. You may learn ways to cope with unwanted events or thoughts by looking at your fears in a way that feels safe.  What care is needed at home?   Ask your doctor what you need to do when you go home. Make sure you ask questions if you do not understand what the doctor says.  Set a time to talk with a counselor about your worries and feelings. This can help you with your anxiety.  Take care to follow all instructions when you take your medicines.  Limit alcohol and caffeine.  Learn ways to manage stress. Relaxation methods like reflection, deep breathing, and muscle relaxation may be helpful. Things like yoga, exercise, and mike chi  are also good.  Talk about your feelings with family members and friends you trust. Talk to someone who can help you see how your thoughts at certain times may raise your anxiety.     What follow-up care is needed?   Your doctor may ask you to make visits to the office to check on your progress. Be sure to keep these visits.  What drugs may be needed?   The doctor may order drugs to help the physical signs of anxiety. Make sure that you take the drugs as taught to you by the doctor. Talk with your doctor about any side effects and ask how long you should take the drug.  Will physical activity be limited?   You may take part in physical activities. Some people are limited because of their anxiety or fear. Talk with your doctor about the right amount of activity for you.  What changes to diet are needed?   Eat a variety of healthy foods and limit drinks with caffeine. You should avoid alcohol, energy drinks, and over-the-counter stimulants.  What problems could happen?   If your anxiety is not treated, it can result in:  Staying away from work or social events  Not being able to do everyday tasks  Keeping away from family and friends  What can be done to prevent this health problem?   Learn what events, people, or things upset you. Limit your contact with them.  Talk about your feelings. Talk to someone who can help you see how your thoughts at certain times may raise your anxiety.  Seek support from your friends and family. Find someone who calms you down. Ask if you can call them when you are getting anxious.  When do I need to call the doctor?   You feel you may harm yourself or someone else.  You can also call a mental health hotline for help.  You have any physical symptoms, such as chest pain, trouble breathing, or severe belly pain, that could be a sign of a serious problem.  If you are short of breath.  If you do not feel like you can be alone.  Teach Back: Helping You Understand   The Teach Back Method helps  you understand the information we are giving you. After you talk with the staff, tell them in your own words what you learned. This helps to make sure the staff has described each thing clearly. It also helps to explain things that may have been confusing. Before going home, make sure you can do these:  I can tell you about my condition and the drugs I need to take.  I can tell you what may help lower my anxiety.  I can tell you what I will do if it is hard to breathe or I have chest pain.  I can tell you what I will do if I do not feel safe or cannot be alone.  Where can I learn more?   [employer]  https://www.gricelda.org/Learn-More/Mental-Health-Conditions/Anxiety-Disorders   National Health Service  https://www.nhs.uk/conditions/generalised-anxiety-disorder/symptoms/   [place] [facility] ? [facility]  https://www.sudha.nih.gov/health/relieving-stress-anxiety-mdorlbjsu-omagwwfvbj-vlzecglcju   [place] of Mental Health  http://www.nimh.nih.gov/health/publications/anxiety-disorders/complete-index.shtml   Last Reviewed Date   2021-06-08  Consumer Information Use and Disclaimer   This information is not specific medical advice and does not replace information you receive from your health care provider. This is only a brief summary of general information. It does NOT include all information about conditions, illnesses, injuries, tests, procedures, treatments, therapies, discharge instructions or life-style choices that may apply to you. You must talk with your health care provider for complete information about your health and treatment options. This information should not be used to decide whether or not to accept your health care providers advice, instructions or recommendations. Only your health care provider has the knowledge and training to provide advice that is right for you.  Copyright   Copyright © 2021 [employer], [employer]. and its affiliates and/or licensors. All rights reserved.    Patient  Education       Attention Deficit Hyperactivity Disorder (ADHD) Discharge Instructions   About this topic   Attention deficit hyperactivity disorder is also called ADHD or ADD. Most often, this starts at a young age. This disorder makes it hard to focus or sit still. People with ADHD may find it hard to make good decisions. Children with ADHD may have trouble in school and at home. Adults may have problems at work. People with ADHD may also have a problem getting along well with others. While there is no cure for ADHD, you and your doctor can work on a plan that is best for you to treat the signs of ADHD.  What care is needed at home?   Ask your doctor what you need to do when you go home. Make sure you ask questions if you do not understand what the doctor says. This way you will know what you need to do.  Try to get enough sleep at night. Most often adults need 7 to 8 hours each night and children need 8 to 10 hours each night. Rest during the day if you are tired.  Eat and sleep at the same times every day.  Have a plan for where to keep things in your home. Use checklists, things to help you remember, and alarms. A list of things you may need to find in a hurry can be helpful. These can help you put things in the right place and manage your time.  Work on getting better at reading and taking notes.  Have a space that is just for work or homework so you will be able to pay attention. This may be an office or a desk facing a wall. Try using headphones so you cannot hear the other noises.  Do one thing at a time. Keep a list of the next things you were planning to do or say.  Break large jobs or things you have to do into smaller jobs. This will make them easier to finish. Reward yourself along the way.  Have rules that are clear and easy to follow.   Look at where you are the strongest. Give rewards for good behavior, finished schoolwork, or for doing a good job at work.  Do not do too many things that might cause  stress.  What follow-up care is needed?   Your doctor may ask you to make visits to the office to check on your progress. Be sure to keep these visits.  Your doctor may send you to a special mental health doctor. This person will talk with you about the problems you are having. Then, you can work together to find ways to help you manage them.  Your doctor may suggest you try talk therapy to help you live well with your ADHD.  What drugs may be needed?   The doctor may order drugs to:  Help lower your worry  Help you to pay attention  Help you be more calm  Help you be less impulsive  Help keep things in your life balanced  Care for low mood  Will physical activity be limited?   Physical activity will help keep your mind and body in good shape. Talk with your doctor about the right amount of activity for you.  What problems could happen?   Feeling badly about yourself  Raise the chances of you hurting yourself, such as injury in a car accident  Not do well in school and work  Trouble making friends or getting along well with others  Raise your chance to abuse alcohol or drugs  What can be done to prevent this health problem?   The cause of ADHD is not clear, but to lower the chance of your child having ADHD:  Do not drink beer, wine, or mixed drinks (alcohol) while you are pregnant.  Do not smoke or use illegal drugs while you are pregnant.  Keep your child away from things like harmful toxins and chemicals.  Keep your child from having too much time in front of computers, TV, and video games.  Get plenty of exercise.  Be more aware of thoughts, emotions, and experiences each moment. This is mindfulness.  When do I need to call the doctor?   Drugs are not working  Symptoms are getting worse  Teach Back: Helping You Understand   The Teach Back Method helps you understand the information we are giving you. After you talk with the staff, tell them in your own words what you learned. This helps to make sure the staff has  described each thing clearly. It also helps to explain things that may have been confusing. Before going home, make sure you can do these:  I can tell you about my condition.  I can tell you ways to help me pay attention.  I can tell you what I will do if I think my drugs are not working.  Where can I learn more?   HelpGuide.org  http://www.helpguide.org/articles/add-adhd/attention-deficit-disorder-adhd-parenting-tips.htm   KidsHealth  http://kidshealth.org/parent/medical/learning/adhd.html   [place] [facility]  http://www.[facility].nih.gov/health/topics/attention-deficit-hyperactivity-disorder-adhd/index.shtml   Last Reviewed Date   2020-06-14  Consumer Information Use and Disclaimer   This information is not specific medical advice and does not replace information you receive from your health care provider. This is only a brief summary of general information. It does NOT include all information about conditions, illnesses, injuries, tests, procedures, treatments, therapies, discharge instructions or life-style choices that may apply to you. You must talk with your health care provider for complete information about your health and treatment options. This information should not be used to decide whether or not to accept your health care providers advice, instructions or recommendations. Only your health care provider has the knowledge and training to provide advice that is right for you.  Copyright   Copyright © 2021 [employer], [employer]. and its affiliates and/or licensors. All rights reserved.

## 2024-11-04 NOTE — PROGRESS NOTES
Subjective:       Patient ID: Isabella Santana is a 58 y.o. female.    Chief Complaint: Follow-up    59 y/o female presents for F/U.    Ms. Santnaa is still on Worker man's Comp after injuring her left left shoulder at work on 07/18/24. She initially received a steroid shot to her left shoulder by Dr. Medina and has completed 11 physical therapy sessions with [facility] so far out of the 12 sessions ordered. She was seen by Dr. Medina last week and he ordered 12 more sessions of PT due to Ms. Santana still having pain to her left shoulder ans some stiffness with certain movements. Ms. Santana previously had rotator cuff injury to her left shoulder last year by Dr. Medina which she tolerated well.    HLD - On last annual blood work drawn in April, her cholesterol came back elevated, LDL was 102 so she has cut back on eating fried foods.     Anxiety - chronic, controlled well on clonazepam as needed. Denies se/ar to medication. Denies being depressed. Denies SI/HI. Needs refill.    ADHD - controlled with Vyvanse 50 mg daily. No s/e of CP, palpitations, syncope, Denies se/ar to medication.  checked and is appropriate.    Patient had a total hysterectomy in 2012 so she takes Estradiol daily which works well. Mammogram due but she has been unable to get it done right now due to her left shoulder injury.    Cologuard done in September and was negative.                      Past Medical History:   Diagnosis Date    ADHD (attention deficit hyperactivity disorder)     Anxiety     Hormone deficiency     Insomnia        Past Surgical History:   Procedure Laterality Date    BREAST BIOPSY Left     HYSTERECTOMY      ROTATOR CUFF REPAIR Left 01/2023    WISDOM TOOTH EXTRACTION         Family History   Problem Relation Name Age of Onset    Diabetes Mother      Cancer Father      Heart disease Father      Cancer Sister         Social History     Tobacco Use    Smoking status: Former     Types: Vaping with nicotine     Smokeless tobacco: Never   Substance Use Topics    Alcohol use: Yes    Drug use: Never       Patient Active Problem List   Diagnosis    Attention deficit hyperactivity disorder    Breast lump    Primary insomnia    Menopausal disorder    Generalized anxiety disorder    Superior glenoid labrum lesion of left shoulder    Complete rotator cuff tear or rupture of left shoulder, not specified as traumatic    Osteoarthritis of left AC (acromioclavicular) joint    Tendinopathy of left biceps tendon    Greater tuberosity of humerus fracture    Traumatic complete tear of left rotator cuff       Immunization History   Administered Date(s) Administered    COVID-19, MRNA, LN-S, PF (Pfizer) (Purple Cap) 07/27/2021, 08/17/2021    Influenza - Quadrivalent - PF *Preferred* (6 months and older) 10/05/2020, 09/23/2021, 10/24/2022, 10/06/2023    Influenza - Trivalent - Fluarix, Flulaval, Fluzone, Afluria - PF 09/27/2024           Review of Systems   Constitutional:  Negative for activity change, appetite change, chills, diaphoresis, fatigue and fever.   HENT:  Negative for tinnitus and trouble swallowing.    Eyes:  Negative for visual disturbance.   Respiratory:  Negative for cough, chest tightness, shortness of breath and wheezing.    Cardiovascular:  Negative for chest pain, palpitations and leg swelling.   Gastrointestinal:  Negative for abdominal distention, abdominal pain, blood in stool, constipation, diarrhea and nausea.   Genitourinary:  Negative for decreased urine volume, dysuria, frequency, hematuria and urgency.   Musculoskeletal:  Positive for arthralgias (left shoulder). Negative for gait problem, joint swelling, neck pain and neck stiffness.   Skin:  Negative for color change and rash.   Neurological:  Negative for dizziness, syncope, weakness, light-headedness, numbness and headaches.   Psychiatric/Behavioral:  Negative for behavioral problems, confusion and dysphoric mood. The patient is not nervous/anxious.   "    Objective:     Vitals:    11/04/24 0818   BP: 99/67   BP Location: Right arm   Patient Position: Sitting   Pulse: 96   Resp: 18   Temp: 98.2 °F (36.8 °C)   TempSrc: Oral   SpO2: 97%   Weight: 49.9 kg (110 lb)   Height: 5' 1" (1.549 m)       Physical Exam  Vitals and nursing note reviewed.   Constitutional:       General: She is not in acute distress.     Appearance: Normal appearance. She is not diaphoretic.   HENT:      Head: Normocephalic and atraumatic.      Mouth/Throat:      Mouth: Mucous membranes are moist.      Pharynx: Oropharynx is clear.   Eyes:      Extraocular Movements: Extraocular movements intact.      Conjunctiva/sclera: Conjunctivae normal.   Cardiovascular:      Rate and Rhythm: Normal rate and regular rhythm.      Pulses: Normal pulses.      Heart sounds: Normal heart sounds.   Pulmonary:      Effort: Pulmonary effort is normal.      Breath sounds: Normal breath sounds. No stridor. No wheezing or rales.   Abdominal:      General: There is no distension.      Tenderness: There is no abdominal tenderness.   Musculoskeletal:         General: Tenderness (left shoulder) and signs of injury present. No swelling. Normal range of motion.      Cervical back: Normal range of motion.      Right lower leg: No edema.      Left lower leg: No edema.   Lymphadenopathy:      Cervical: No cervical adenopathy.   Skin:     General: Skin is warm and dry.      Capillary Refill: Capillary refill takes less than 2 seconds.   Neurological:      Mental Status: She is alert and oriented to person, place, and time.   Psychiatric:         Mood and Affect: Mood and affect normal.         Behavior: Behavior normal. Behavior is cooperative.         Thought Content: Thought content normal.         Judgment: Judgment normal.         No visits with results within 6 Month(s) from this visit.   Latest known visit with results is:   Orders Only on 04/24/2024   Component Date Value Ref Range Status    Vit D, 25-Hydroxy 04/24/2024 " 32  >30 ng/mL Final         Assessment:      1. Generalized anxiety disorder Well controlled   2. Attention deficit hyperactivity disorder (ADHD), combined type Well controlled   3. Menopausal disorder Stable   4. Mixed hyperlipidemia Well controlled         Plan:     Generalized anxiety disorder  Comments:  Continue Clonazepam as needed, refilled  Orders:  -     clonazePAM (KLONOPIN) 1 MG tablet; Take 1 tablet (1 mg total) by mouth 2 (two) times daily as needed for Anxiety.  Dispense: 60 tablet; Refill: 2    Attention deficit hyperactivity disorder (ADHD), combined type  Comments:  Continue Vyvanse daily, F/U in 3 months.    Menopausal disorder  Comments:  continue estradiol daily    Mixed hyperlipidemia  Comments:  continue lowcholesterol diet and exercise regularly         Current Outpatient Medications   Medication Sig Dispense Refill    estradioL (ESTRACE) 1 MG tablet TAKE 1 TABLET BY MOUTH EVERY DAY 90 tablet 3    ferrous sulfate (FEOSOL) 325 mg (65 mg iron) Tab tablet TAKE 1 TABLET BY MOUTH EVERY DAY WITH BREAKFAST 90 tablet 0    lisdexamfetamine (VYVANSE) 50 MG capsule Take 1 capsule (50 mg total) by mouth every morning. 30 capsule 0    clonazePAM (KLONOPIN) 1 MG tablet Take 1 tablet (1 mg total) by mouth 2 (two) times daily as needed for Anxiety. 60 tablet 2     No current facility-administered medications for this visit.       Medications Discontinued During This Encounter   Medication Reason    clonazePAM (KLONOPIN) 1 MG tablet Reorder       Health Maintenance   Topic Date Due    Hepatitis C Screening  Never done    TETANUS VACCINE  Never done    Shingles Vaccine (1 of 2) Never done    Mammogram  08/07/2024    Lipid Panel  04/24/2029    Colorectal Cancer Screening  Discontinued       Patient Instructions   RTC in 3 months for F/U or sooner if needed.    Patient Education       Low Cholesterol, Saturated Fat, and Trans Fat Diet   About this topic   Cholesterol, saturated fat, and trans fat are in many  "foods. These may raise your blood cholesterol levels. If your cholesterol is too high, this can cause health problems in your heart, liver, kidneys, and even your eyes. The key to lowering your risk of heart problems is to lower your bad fat intake.  Saturated fats and trans fats are the bad fats. These fats clog your arteries and raise your bad cholesterol. Saturated fats and trans fats are solid fats at room temperature. Saturated fats are animal fats. Trans fats are manmade fats. They add flavor to a lot of packaged foods. Staying away from saturated and trans fats will help your heart.  When you do eat foods with fat, make sure they have the good fats. Monounsaturated and polyunsaturated fats are good fats. These fats help raise your good cholesterol and protect your heart.  General   How to Lower Fat and Cholesterol in Your Diet   Read the labels of the foods you buy from the market to find out how much fat is present. Under 5% of total fat on a label means it is "low fat". Over 20% of total fat on a label means it is high fat.  Eat high fiber foods, like soluble fiber. This type of fiber helps lower cholesterol in the body. Choose oatmeal, fruits (like apples), beans, and nuts to get the most soluble fiber.  Eat foods high in omega-3 fatty acids like sha seeds, walnuts, salmon, tuna, trout, herring, flaxseed, and soybeans. These foods help keep the heart healthy.  Limit your bad fat and oil intake.  Stay away from butter, stick margarine, shortening, lard, and palm and coconut oil. Pick plant-based spreads instead.  Limit mayonnaise, salad dressings, gravies, and sauces, unless it is made from low-fat ingredients.  Limit chocolate.  Do not eat high-fat processed foods like hot dogs, luna, sausage, ham and other luncheon meats high in fat, and some frozen foods. Pick fish, chicken, turkey, and lean meats instead.  Eat more dried beans, lentils, and tofu to get your protein.  Do not eat organ meats, like " liver.  Choose nonfat or low-fat milk, yogurt, and cheese.  Use light or fat-free cream cheese and sour cream.  Eat lots of fruits and vegetables.  Pick whole grain breads, cereals, pastas, and rice.  Do not eat snacks that are high in fats like granola, cookies, pies, pastries, doughnuts, and croissants.  Stay away from deep fried foods.  Help When Cooking   Remove the fat portion of meats and the skin from poultry before cooking.  Bake, broil, grill, poach, or roast poultry, fish, and lean meats.  Drain and throw away the fat that drains out of meat as you cook it.  Try to add little or no fat to foods.  Use olive or canola oil for cooking or baking.  Steam your vegetables.  Use herbs or no-oil marinades to flavor foods.         Who should use this diet?   This diet is for people who are at high risk of getting health problems like heart disease, high blood pressure, diabetes, and others. This diet is also good for all people to follow to keep your heart healthy.  What foods are good to eat?   Foods with good fats are:  Canola, peanut, and olive oil  Safflower, soybean, and corn oil  Walnuts, almonds, cashews, and peanuts  Pumpkin and sunflower seeds  [place] and tuna  Tofu  Soymilk  Avocado  What foods should be limited or avoided?   Stay away from these types of foods that have saturated fats:  Whole fat dairy products like cheese, ice cream, whole milk, and cream  Palm and coconut oils  High-fat meats like beef, lamb, poultry with the skin, luna, and sausage  Butter and lard  Stay away from these types of foods that may have trans fat:  Cookies, cakes, candy, doughnuts, baked goods, muffins, pizza dough, and pie crusts that are packaged  Fried foods  Frozen dinners  Chips and crackers  Microwave popcorn  Stick margarine and vegetable shortenings  Helpful tips   To help stay away from saturated fat:  Pick lean cuts of meat  Take the skin off chicken and turkey or pick skinless  Pick low-fat cheese, milk, and ice  cream  Use liquid oils when cooking and baking, such as olive oil and canola oil  To help stay away from trans fat:  Look at your labels. Choose foods with 0% trans fat. Read the ingredient list. Avoid foods with partially hydrogenated oil in the ingredient list. This means there is trans fat in the product.  Where can I learn more?   American Heart Association   http://www.heart.org/HEARTORG/Conditions/Cholesterol/PreventionTreatmentofHighCholesterol/Know-Your-Fats_[facility]_305628_Article.jsp   Last Reviewed Date   2021-10-05  Consumer Information Use and Disclaimer   This information is not specific medical advice and does not replace information you receive from your health care provider. This is only a brief summary of general information. It does NOT include all information about conditions, illnesses, injuries, tests, procedures, treatments, therapies, discharge instructions or life-style choices that may apply to you. You must talk with your health care provider for complete information about your health and treatment options. This information should not be used to decide whether or not to accept your health care providers advice, instructions or recommendations. Only your health care provider has the knowledge and training to provide advice that is right for you.   Copyright   Copyright © 2021 UpToDate, Inc. and its affiliates and/or licensors. All rights reserved.    Patient Education       Anxiety Discharge Instructions, Adult   About this topic   Anxiety can cause you to feel very worried. It can also cause physical symptoms like chest pain, stomach aches, or trouble sleeping. While mild anxiety is a normal response to stress, it can cause you problems in your everyday life. You may need follow-up care to help manage your anxiety. Anxiety happens in many forms, like:  Being scared all the time that something bad is going to happen. This is generalized anxiety.  Strong bursts of fear where your body has signs  that may feel like a heart attack. This is called a panic attack.  Upsetting thoughts that happen often. There is a need to repeat doing certain things to help get rid of the anxiety caused by these thoughts. The thoughts or actions may be about checking on things, touching things, or worry about germs. This is an obsessive-compulsive disorder.  Strong fear of an object, place, or condition. This is a phobia.  Fear that others think bad things about you or being put down by other people. This is social anxiety.  Nightmares, flashbacks, staying away from people, or having panic attacks when reminded of a shocking or hurtful time or place from the past. This is post-traumatic stress.  Anxiety disorder may be treated in many ways. Some kinds of treatment have you talk about your beliefs, fears, and worries. You may learn how certain thoughts or feelings can raise anxiety. You may also learn what steps to take to lower anxiety. Other kinds of treatment may have you look back on a hurtful event, sad memory, or something you are afraid of. The doctor will help you deal with the feelings that you may have. You may learn ways to cope with unwanted events or thoughts by looking at your fears in a way that feels safe.  What care is needed at home?   Ask your doctor what you need to do when you go home. Make sure you ask questions if you do not understand what the doctor says.  Set a time to talk with a counselor about your worries and feelings. This can help you with your anxiety.  Take care to follow all instructions when you take your medicines.  Limit alcohol and caffeine.  Learn ways to manage stress. Relaxation methods like reflection, deep breathing, and muscle relaxation may be helpful. Things like yoga, exercise, and mike chi are also good.  Talk about your feelings with family members and friends you trust. Talk to someone who can help you see how your thoughts at certain times may raise your anxiety.     What  follow-up care is needed?   Your doctor may ask you to make visits to the office to check on your progress. Be sure to keep these visits.  What drugs may be needed?   The doctor may order drugs to help the physical signs of anxiety. Make sure that you take the drugs as taught to you by the doctor. Talk with your doctor about any side effects and ask how long you should take the drug.  Will physical activity be limited?   You may take part in physical activities. Some people are limited because of their anxiety or fear. Talk with your doctor about the right amount of activity for you.  What changes to diet are needed?   Eat a variety of healthy foods and limit drinks with caffeine. You should avoid alcohol, energy drinks, and over-the-counter stimulants.  What problems could happen?   If your anxiety is not treated, it can result in:  Staying away from work or social events  Not being able to do everyday tasks  Keeping away from family and friends  What can be done to prevent this health problem?   Learn what events, people, or things upset you. Limit your contact with them.  Talk about your feelings. Talk to someone who can help you see how your thoughts at certain times may raise your anxiety.  Seek support from your friends and family. Find someone who calms you down. Ask if you can call them when you are getting anxious.  When do I need to call the doctor?   You feel you may harm yourself or someone else.  You can also call a mental health hotline for help.  You have any physical symptoms, such as chest pain, trouble breathing, or severe belly pain, that could be a sign of a serious problem.  If you are short of breath.  If you do not feel like you can be alone.  Teach Back: Helping You Understand   The Teach Back Method helps you understand the information we are giving you. After you talk with the staff, tell them in your own words what you learned. This helps to make sure the staff has described each thing  clearly. It also helps to explain things that may have been confusing. Before going home, make sure you can do these:  I can tell you about my condition and the drugs I need to take.  I can tell you what may help lower my anxiety.  I can tell you what I will do if it is hard to breathe or I have chest pain.  I can tell you what I will do if I do not feel safe or cannot be alone.  Where can I learn more?   [employer]  https://www.gricelda.org/Learn-More/Mental-Health-Conditions/Anxiety-Disorders   National Health Service  https://www.nhs.uk/conditions/generalised-anxiety-disorder/symptoms/   [place] [facility]  https://www.sudha.nih.gov/health/relieving-stress-anxiety-lolhexxyw-gtmidsqicz-thzwgzqxbc   [place] of Mental Health  http://www.nimh.nih.gov/health/publications/anxiety-disorders/complete-index.shtml   Last Reviewed Date   2021-06-08  Consumer Information Use and Disclaimer   This information is not specific medical advice and does not replace information you receive from your health care provider. This is only a brief summary of general information. It does NOT include all information about conditions, illnesses, injuries, tests, procedures, treatments, therapies, discharge instructions or life-style choices that may apply to you. You must talk with your health care provider for complete information about your health and treatment options. This information should not be used to decide whether or not to accept your health care providers advice, instructions or recommendations. Only your health care provider has the knowledge and training to provide advice that is right for you.  Copyright   Copyright © 2021 UpToDate, Inc. and its affiliates and/or licensors. All rights reserved.    Patient Education       Attention Deficit Hyperactivity Disorder (ADHD) Discharge Instructions   About this topic   Attention deficit hyperactivity disorder is also called ADHD or ADD. Most often, this  starts at a young age. This disorder makes it hard to focus or sit still. People with ADHD may find it hard to make good decisions. Children with ADHD may have trouble in school and at home. Adults may have problems at work. People with ADHD may also have a problem getting along well with others. While there is no cure for ADHD, you and your doctor can work on a plan that is best for you to treat the signs of ADHD.  What care is needed at home?   Ask your doctor what you need to do when you go home. Make sure you ask questions if you do not understand what the doctor says. This way you will know what you need to do.  Try to get enough sleep at night. Most often adults need 7 to 8 hours each night and children need 8 to 10 hours each night. Rest during the day if you are tired.  Eat and sleep at the same times every day.  Have a plan for where to keep things in your home. Use checklists, things to help you remember, and alarms. A list of things you may need to find in a hurry can be helpful. These can help you put things in the right place and manage your time.  Work on getting better at reading and taking notes.  Have a space that is just for work or homework so you will be able to pay attention. This may be an office or a desk facing a wall. Try using headphones so you cannot hear the other noises.  Do one thing at a time. Keep a list of the next things you were planning to do or say.  Break large jobs or things you have to do into smaller jobs. This will make them easier to finish. Reward yourself along the way.  Have rules that are clear and easy to follow.   Look at where you are the strongest. Give rewards for good behavior, finished schoolwork, or for doing a good job at work.  Do not do too many things that might cause stress.  What follow-up care is needed?   Your doctor may ask you to make visits to the office to check on your progress. Be sure to keep these visits.  Your doctor may send you to a special  mental health doctor. This person will talk with you about the problems you are having. Then, you can work together to find ways to help you manage them.  Your doctor may suggest you try talk therapy to help you live well with your ADHD.  What drugs may be needed?   The doctor may order drugs to:  Help lower your worry  Help you to pay attention  Help you be more calm  Help you be less impulsive  Help keep things in your life balanced  Care for low mood  Will physical activity be limited?   Physical activity will help keep your mind and body in good shape. Talk with your doctor about the right amount of activity for you.  What problems could happen?   Feeling badly about yourself  Raise the chances of you hurting yourself, such as injury in a car accident  Not do well in school and work  Trouble making friends or getting along well with others  Raise your chance to abuse alcohol or drugs  What can be done to prevent this health problem?   The cause of ADHD is not clear, but to lower the chance of your child having ADHD:  Do not drink beer, wine, or mixed drinks (alcohol) while you are pregnant.  Do not smoke or use illegal drugs while you are pregnant.  Keep your child away from things like harmful toxins and chemicals.  Keep your child from having too much time in front of computers, TV, and video games.  Get plenty of exercise.  Be more aware of thoughts, emotions, and experiences each moment. This is mindfulness.  When do I need to call the doctor?   Drugs are not working  Symptoms are getting worse  Teach Back: Helping You Understand   The Teach Back Method helps you understand the information we are giving you. After you talk with the staff, tell them in your own words what you learned. This helps to make sure the staff has described each thing clearly. It also helps to explain things that may have been confusing. Before going home, make sure you can do these:  I can tell you about my condition.  I can tell you  ways to help me pay attention.  I can tell you what I will do if I think my drugs are not working.  Where can I learn more?   HelpGuide.org  http://www.helpguide.org/articles/add-adhd/attention-deficit-disorder-adhd-parenting-tips.htm   KidsHealth  http://kidshealth.org/parent/medical/learning/adhd.html   [place] of [facility]  http://www.[facility].nih.gov/health/topics/attention-deficit-hyperactivity-disorder-adhd/index.shtml   Last Reviewed Date   2020-06-14  Consumer Information Use and Disclaimer   This information is not specific medical advice and does not replace information you receive from your health care provider. This is only a brief summary of general information. It does NOT include all information about conditions, illnesses, injuries, tests, procedures, treatments, therapies, discharge instructions or life-style choices that may apply to you. You must talk with your health care provider for complete information about your health and treatment options. This information should not be used to decide whether or not to accept your health care providers advice, instructions or recommendations. Only your health care provider has the knowledge and training to provide advice that is right for you.  Copyright   Copyright © 2021 [employer] and its affiliates and/or licensors. All rights reserved.        Risks, benefits, and alternatives discussed with patient, Patient verbalized understanding of discussed plan of care. Asked patient if any further questions, answered no.    Future Appointments   Date Time Provider Department Center   2/25/2025  8:00 AM Stephanie Camacho NP [id number]  Jewel Camacho NP

## 2024-11-18 DIAGNOSIS — F90.2 ATTENTION DEFICIT HYPERACTIVITY DISORDER (ADHD), COMBINED TYPE: ICD-10-CM

## 2024-11-18 RX ORDER — LISDEXAMFETAMINE DIMESYLATE 50 MG/1
50 CAPSULE ORAL EVERY MORNING
Qty: 30 CAPSULE | Refills: 0 | Status: SHIPPED | OUTPATIENT
Start: 2024-11-18

## 2024-12-16 DIAGNOSIS — F90.2 ATTENTION DEFICIT HYPERACTIVITY DISORDER (ADHD), COMBINED TYPE: ICD-10-CM

## 2024-12-16 RX ORDER — LISDEXAMFETAMINE DIMESYLATE 50 MG/1
50 CAPSULE ORAL EVERY MORNING
Qty: 30 CAPSULE | Refills: 0 | Status: SHIPPED | OUTPATIENT
Start: 2024-12-16

## 2025-01-16 DIAGNOSIS — F90.2 ATTENTION DEFICIT HYPERACTIVITY DISORDER (ADHD), COMBINED TYPE: ICD-10-CM

## 2025-01-16 RX ORDER — LISDEXAMFETAMINE DIMESYLATE 50 MG/1
50 CAPSULE ORAL EVERY MORNING
Qty: 30 CAPSULE | Refills: 0 | Status: SHIPPED | OUTPATIENT
Start: 2025-01-16

## 2025-02-14 DIAGNOSIS — F90.2 ATTENTION DEFICIT HYPERACTIVITY DISORDER (ADHD), COMBINED TYPE: ICD-10-CM

## 2025-02-14 DIAGNOSIS — Z00.00 ANNUAL PHYSICAL EXAM: Primary | ICD-10-CM

## 2025-02-14 DIAGNOSIS — Z02.83 ENCOUNTER FOR DRUG SCREENING: ICD-10-CM

## 2025-02-17 RX ORDER — LISDEXAMFETAMINE DIMESYLATE 50 MG/1
50 CAPSULE ORAL EVERY MORNING
Qty: 30 CAPSULE | Refills: 0 | Status: SHIPPED | OUTPATIENT
Start: 2025-02-17

## 2025-03-03 RX ORDER — CEFDINIR 300 MG/1
300 CAPSULE ORAL 2 TIMES DAILY
Qty: 28 CAPSULE | Refills: 0 | Status: SHIPPED | OUTPATIENT
Start: 2025-03-03 | End: 2025-03-17

## 2025-03-04 ENCOUNTER — OFFICE VISIT (OUTPATIENT)
Dept: PRIMARY CARE CLINIC | Facility: CLINIC | Age: 60
End: 2025-03-04
Payer: COMMERCIAL

## 2025-03-04 VITALS
WEIGHT: 112.19 LBS | HEART RATE: 98 BPM | BODY MASS INDEX: 21.18 KG/M2 | SYSTOLIC BLOOD PRESSURE: 105 MMHG | RESPIRATION RATE: 18 BRPM | DIASTOLIC BLOOD PRESSURE: 69 MMHG | TEMPERATURE: 98 F | HEIGHT: 61 IN

## 2025-03-04 DIAGNOSIS — N95.9 MENOPAUSAL DISORDER: ICD-10-CM

## 2025-03-04 DIAGNOSIS — E78.2 MIXED HYPERLIPIDEMIA: ICD-10-CM

## 2025-03-04 DIAGNOSIS — Z00.00 ANNUAL PHYSICAL EXAM: Primary | ICD-10-CM

## 2025-03-04 DIAGNOSIS — F41.1 GENERALIZED ANXIETY DISORDER: ICD-10-CM

## 2025-03-04 DIAGNOSIS — E55.9 VITAMIN D DEFICIENCY: ICD-10-CM

## 2025-03-04 DIAGNOSIS — Z13.1 DIABETES MELLITUS SCREENING: ICD-10-CM

## 2025-03-04 DIAGNOSIS — Z11.59 NEED FOR HEPATITIS C SCREENING TEST: ICD-10-CM

## 2025-03-04 DIAGNOSIS — Z11.4 SCREENING FOR HIV (HUMAN IMMUNODEFICIENCY VIRUS): ICD-10-CM

## 2025-03-04 DIAGNOSIS — F90.2 ATTENTION DEFICIT HYPERACTIVITY DISORDER (ADHD), COMBINED TYPE: ICD-10-CM

## 2025-03-04 DIAGNOSIS — Z13.29 THYROID DISORDER SCREEN: ICD-10-CM

## 2025-03-04 PROCEDURE — 3078F DIAST BP <80 MM HG: CPT | Mod: CPTII,,, | Performed by: NURSE PRACTITIONER

## 2025-03-04 PROCEDURE — 99396 PREV VISIT EST AGE 40-64: CPT | Mod: S$PBB,,, | Performed by: NURSE PRACTITIONER

## 2025-03-04 PROCEDURE — 1160F RVW MEDS BY RX/DR IN RCRD: CPT | Mod: CPTII,,, | Performed by: NURSE PRACTITIONER

## 2025-03-04 PROCEDURE — 3008F BODY MASS INDEX DOCD: CPT | Mod: CPTII,,, | Performed by: NURSE PRACTITIONER

## 2025-03-04 PROCEDURE — 1159F MED LIST DOCD IN RCRD: CPT | Mod: CPTII,,, | Performed by: NURSE PRACTITIONER

## 2025-03-04 PROCEDURE — 3074F SYST BP LT 130 MM HG: CPT | Mod: CPTII,,, | Performed by: NURSE PRACTITIONER

## 2025-03-04 NOTE — PROGRESS NOTES
Subjective:       Patient ID: Isabella Santana is a 59 y.o. female.    Chief Complaint: Annual Exam    60 y/o female presents for annual visit.    Ms. Santana reports she hurt her right index finger on Sunday and she was prescribed omnicef BID for 14 days which she is currently taking.    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 She initially received a steroid shot to her left shoulder by Dr. Medina and has completed 11 physical therapy sessions with [facility] so far out of the 12 sessions ordered. She was seen by Dr. Medina last week and he ordered 12 more sessions of PT due to Ms. Santana still having pain to her left shoulder ans some stiffness with certain movements. Ms. Santana previously had rotator cuff injury to her left shoulder last year by Dr. Medina which she tolerated well.    HLD - On last annual blood work drawn last year, her cholesterol came back elevated, LDL was 102 so she has cut back on eating fried foods.     Anxiety - chronic, controlled well on clonazepam as needed. Denies se/ar to medication. Denies being depressed. Denies SI/HI.     ADHD - controlled with Vyvanse 50 mg daily. No s/e of CP, palpitations, syncope, Denies se/ar to medication.  checked and is appropriate.    Patient had a total hysterectomy in 2012 so she takes Estradiol daily which works well. Mammogram due but she has been unable to get it done right now due to her left shoulder injury.    Cologuard done in September and was negative.    Denies smoking or drinking.                      Past Medical History:   Diagnosis Date    ADHD (attention deficit hyperactivity disorder)     Anxiety     Hormone deficiency     Insomnia        Past Surgical History:   Procedure Laterality Date    BREAST BIOPSY Left     HYSTERECTOMY      ROTATOR CUFF REPAIR Left 01/2023    WISDOM TOOTH EXTRACTION         Family History   Problem Relation Name Age of Onset    Diabetes Mother Mom     Hearing loss Mother Mom         deaf     Cancer Father pop         colon/skin non fatal    Heart disease Father pop     Kidney disease Father pop         cysts    Cancer Sister #1         Kidney cancer/passed away    Cancer Maternal Aunt Barbara [place]         Throat cancer/passed    Depression Daughter A     Miscarriages / Stillbirths Daughter Ray     Cancer Sister Jayla         Pancreatic cancer       Social History[1]    Problem List[2]    Immunization History   Administered Date(s) Administered    COVID-19, MRNA, LN-S, PF (Pfizer) (Purple Cap) 07/27/2021, 08/17/2021    Influenza - Quadrivalent - PF *Preferred* (6 months and older) 10/05/2020, 09/23/2021, 10/24/2022, 10/06/2023    Influenza - Trivalent - Fluarix, Flulaval, Fluzone, Afluria - PF 09/27/2024           Review of Systems   Constitutional:  Negative for activity change, appetite change, chills, diaphoresis, fatigue and fever.   HENT:  Positive for rhinorrhea. Negative for congestion, ear pain, postnasal drip, sinus pressure, sinus pain, sneezing, sore throat, tinnitus and trouble swallowing.    Eyes:  Negative for visual disturbance.   Respiratory:  Negative for cough, chest tightness, shortness of breath and wheezing.    Cardiovascular:  Negative for chest pain, palpitations and leg swelling.   Gastrointestinal:  Negative for abdominal distention, abdominal pain, blood in stool, constipation, diarrhea, nausea and vomiting.   Endocrine: Negative for cold intolerance, heat intolerance, polydipsia, polyphagia and polyuria.   Genitourinary:  Negative for decreased urine volume, dysuria, frequency, hematuria and urgency.   Musculoskeletal:  Positive for arthralgias (left shoulder). Negative for back pain, gait problem and neck pain.   Skin:  Positive for wound (right index finger). Negative for color change and rash.   Neurological:  Negative for dizziness, syncope, weakness, light-headedness, numbness and headaches.   Hematological:  Negative for adenopathy. Does not bruise/bleed easily.  "  Psychiatric/Behavioral:  Negative for behavioral problems, confusion and dysphoric mood. The patient is not nervous/anxious.      Objective:     Vitals:    03/04/25 1420   BP: 105/69   BP Location: Right arm   Patient Position: Sitting   Pulse: 98   Resp: 18   Temp: 98.1 °F (36.7 °C)   TempSrc: Oral   Weight: 50.9 kg (112 lb 3.2 oz)   Height: 5' 1" (1.549 m)       Physical Exam  Vitals and nursing note reviewed.   Constitutional:       General: She is not in acute distress.     Appearance: Normal appearance. She is not diaphoretic.   HENT:      Head: Normocephalic and atraumatic.      Nose: Rhinorrhea present. No congestion.      Mouth/Throat:      Mouth: Mucous membranes are moist.      Pharynx: Oropharynx is clear.   Eyes:      General:         Right eye: No discharge.         Left eye: No discharge.      Conjunctiva/sclera: Conjunctivae normal.      Pupils: Pupils are equal, round, and reactive to light.   Neck:      Thyroid: No thyromegaly or thyroid tenderness.   Cardiovascular:      Rate and Rhythm: Normal rate and regular rhythm.      Pulses: Normal pulses.      Heart sounds: Normal heart sounds.   Pulmonary:      Effort: Pulmonary effort is normal. No tachypnea or bradypnea.      Breath sounds: Normal breath sounds. No stridor. No wheezing or rales.   Abdominal:      General: Bowel sounds are normal. There is no distension.      Palpations: Abdomen is soft.      Tenderness: There is no abdominal tenderness. There is no guarding.   Musculoskeletal:         General: Tenderness (left shoulder) present. Normal range of motion.      Cervical back: Normal range of motion.      Right lower leg: No edema.      Left lower leg: No edema.   Lymphadenopathy:      Cervical: No cervical adenopathy.   Skin:     General: Skin is warm and dry.      Capillary Refill: Capillary refill takes less than 2 seconds.      Comments: Laceration to right index finger   Neurological:      General: No focal deficit present.      Mental " Status: She is alert and oriented to person, place, and time.      Cranial Nerves: Cranial nerves 2-12 are intact.      Sensory: Sensation is intact.      Motor: Motor function is intact.      Coordination: Coordination is intact.      Gait: Gait is intact.      Deep Tendon Reflexes: Reflexes are normal and symmetric.   Psychiatric:         Mood and Affect: Mood and affect normal.         Speech: Speech normal.         Behavior: Behavior normal. Behavior is cooperative.         Thought Content: Thought content normal.         Cognition and Memory: Cognition and memory normal.         Judgment: Judgment normal.         No visits with results within 6 Month(s) from this visit.   Latest known visit with results is:   Orders Only on 04/24/2024   Component Date Value Ref Range Status    Vit D, 25-Hydroxy 04/24/2024 32  >30 ng/mL Final         Assessment:      1. Annual physical exam    2. Generalized anxiety disorder    3. Attention deficit hyperactivity disorder (ADHD), combined type    4. Menopausal disorder    5. Mixed hyperlipidemia    6. Diabetes mellitus screening    7. Thyroid disorder screen    8. Vitamin D deficiency    9. Screening for HIV (human immunodeficiency virus)    10. Need for hepatitis C screening test          Plan:     Annual physical exam  Comments:  Will review labs and determine POC based on results.  Orders:  -     CBC Auto Differential; Future; Expected date: 03/04/2025  -     Comprehensive Metabolic Panel; Future; Expected date: 03/04/2025  -     Hemoglobin A1C; Future; Expected date: 03/04/2025  -     Lipid Panel; Future; Expected date: 03/04/2025  -     TSH; Future; Expected date: 03/04/2025    Generalized anxiety disorder    Attention deficit hyperactivity disorder (ADHD), combined type  -     Toxicology screen, urine    Menopausal disorder    Mixed hyperlipidemia  -     Lipid Panel; Future; Expected date: 03/04/2025    Diabetes mellitus screening  -     Hemoglobin A1C; Future; Expected  date: 03/04/2025    Thyroid disorder screen  -     T4, Free; Future; Expected date: 03/04/2025  -     TSH; Future; Expected date: 03/04/2025    Vitamin D deficiency  -     Vitamin D; Future; Expected date: 03/04/2025    Screening for HIV (human immunodeficiency virus)  -     HIV 1/2 Ag/Ab (4th Gen); Future; Expected date: 03/04/2025    Need for hepatitis C screening test  -     Hepatitis C Antibody; Future; Expected date: 03/04/2025         Current Medications[3]    Medications Discontinued During This Encounter   Medication Reason    ferrous sulfate (FEOSOL) 325 mg (65 mg iron) Tab tablet        Health Maintenance   Topic Date Due    Hepatitis C Screening  Never done    HIV Screening  Never done    TETANUS VACCINE  Never done    Shingles Vaccine (1 of 2) Never done    Pneumococcal Vaccines (Age 50+) (1 of 1 - PCV) Never done    Mammogram  08/07/2024    COVID-19 Vaccine (3 - 2024-25 season) 09/01/2024    Lipid Panel  04/24/2029    RSV Vaccine (Age 60+ and Pregnant patients) (1 - 1-dose 75+ series) 12/13/2038    Influenza Vaccine  Completed    Colorectal Cancer Screening  Discontinued       Patient Instructions   RTC in 3 months for F/U or sooner if needed.    Patient Education       Yearly Physical for Adults   About this topic   Most people do not want to be sick. Having a checkup each year with your doctor is one way to help you stay healthy. You may need to see your doctor more or less often. How often you need to go to the doctor depends on your age. Your family and medical history also play a role in how often you need to go to the doctor. Going to see your doctor on a routine basis can help you find problems early or even before they start. This may make it easier to treat or cure your problem.  General   Your doctor will talk about many things during your checkup. Your doctor may ask about:  Your medical and family history.  All the drugs you are taking. Be sure to include all prescription, over the counter,  and herbal supplements. Tell the doctor if you have any drug allergy. Bring a list of drugs you take with you.  How you are feeling and if you are having any problems.  Risky behaviors like smoking, drinking alcohol, using illegal drugs, not wearing seatbelts, having unprotected sex, etc.  Your doctor will do a physical exam and may check your:  Height and weight  Blood pressure  Reflexes  Memory  Vision  Hearing  Your doctor may order:  Lab tests  ECG to check your heart rhythm  X-rays  Tests or treatments based on your exam  What lifestyle changes are needed?   Your doctor may suggest you make changes to your lifestyle at this visit. The doctor may talk with you about being more active or lowering stress levels. Ask your doctor what you need to do.  What drugs may be needed?   Your doctor may order drugs or vaccines to protect you from illnesses.  What changes to diet are needed?   Talk to your doctor to see if any changes are needed to your diet.  When do I need to call the doctor?   Call your doctor if you need to learn about any test results. Together you can make a plan for more care.  Helpful tips   Make a list of questions for your doctor before you go. This will help you remember to ask about any concerns. Write down any answers from your doctor so you can look over them after your visit.   Tell your doctor about any changes in your body or health since your last visit.  Ask your doctor about any screening tests you need.  Where can I learn more?   American Academy of Family Physicians  http://familydoctor.org/familydoctor/en/prevention-wellness/staying-healthy/healthy-living/preventive-services-for-healthy-living.printerview.html   Centers for Disease Control  http://www.cdc.gov/family/checkup/   Last Reviewed Date   2019-04-22  Consumer Information Use and Disclaimer   This information is not specific medical advice and does not replace information you receive from your health care provider. This is only  "a brief summary of general information. It does NOT include all information about conditions, illnesses, injuries, tests, procedures, treatments, therapies, discharge instructions or life-style choices that may apply to you. You must talk with your health care provider for complete information about your health and treatment options. This information should not be used to decide whether or not to accept your health care providers advice, instructions or recommendations. Only your health care provider has the knowledge and training to provide advice that is right for you.  Copyright   Copyright © 2021 UpToDate, Inc. and its affiliates and/or licensors. All rights reserved.    Patient Education       Low Cholesterol, Saturated Fat, and Trans Fat Diet   About this topic   Cholesterol, saturated fat, and trans fat are in many foods. These may raise your blood cholesterol levels. If your cholesterol is too high, this can cause health problems in your heart, liver, kidneys, and even your eyes. The key to lowering your risk of heart problems is to lower your bad fat intake.  Saturated fats and trans fats are the bad fats. These fats clog your arteries and raise your bad cholesterol. Saturated fats and trans fats are solid fats at room temperature. Saturated fats are animal fats. Trans fats are manmade fats. They add flavor to a lot of packaged foods. Staying away from saturated and trans fats will help your heart.  When you do eat foods with fat, make sure they have the good fats. Monounsaturated and polyunsaturated fats are good fats. These fats help raise your good cholesterol and protect your heart.  General   How to Lower Fat and Cholesterol in Your Diet   Read the labels of the foods you buy from the market to find out how much fat is present. Under 5% of total fat on a label means it is "low fat". Over 20% of total fat on a label means it is high fat.  Eat high fiber foods, like soluble fiber. This type of fiber " helps lower cholesterol in the body. Choose oatmeal, fruits (like apples), beans, and nuts to get the most soluble fiber.  Eat foods high in omega-3 fatty acids like sha seeds, walnuts, salmon, tuna, trout, herring, flaxseed, and soybeans. These foods help keep the heart healthy.  Limit your bad fat and oil intake.  Stay away from butter, stick margarine, shortening, lard, and palm and coconut oil. Pick plant-based spreads instead.  Limit mayonnaise, salad dressings, gravies, and sauces, unless it is made from low-fat ingredients.  Limit chocolate.  Do not eat high-fat processed foods like hot dogs, luna, sausage, ham and other luncheon meats high in fat, and some frozen foods. Pick fish, chicken, turkey, and lean meats instead.  Eat more dried beans, lentils, and tofu to get your protein.  Do not eat organ meats, like liver.  Choose nonfat or low-fat milk, yogurt, and cheese.  Use light or fat-free cream cheese and sour cream.  Eat lots of fruits and vegetables.  Pick whole grain breads, cereals, pastas, and rice.  Do not eat snacks that are high in fats like granola, cookies, pies, pastries, doughnuts, and croissants.  Stay away from deep fried foods.  Help When Cooking   Remove the fat portion of meats and the skin from poultry before cooking.  Bake, broil, grill, poach, or roast poultry, fish, and lean meats.  Drain and throw away the fat that drains out of meat as you cook it.  Try to add little or no fat to foods.  Use olive or canola oil for cooking or baking.  Steam your vegetables.  Use herbs or no-oil marinades to flavor foods.         Who should use this diet?   This diet is for people who are at high risk of getting health problems like heart disease, high blood pressure, diabetes, and others. This diet is also good for all people to follow to keep your heart healthy.  What foods are good to eat?   Foods with good fats are:  Canola, peanut, and olive oil  Safflower, soybean, and corn oil  Walnuts,  almonds, cashews, and peanuts  Pumpkin and sunflower seeds  [place] and tuna  Tofu  Soymilk  Avocado  What foods should be limited or avoided?   Stay away from these types of foods that have saturated fats:  Whole fat dairy products like cheese, ice cream, whole milk, and cream  Palm and coconut oils  High-fat meats like beef, lamb, poultry with the skin, luna, and sausage  Butter and lard  Stay away from these types of foods that may have trans fat:  Cookies, cakes, candy, doughnuts, baked goods, muffins, pizza dough, and pie crusts that are packaged  Fried foods  Frozen dinners  Chips and crackers  Microwave popcorn  Stick margarine and vegetable shortenings  Helpful tips   To help stay away from saturated fat:  Pick lean cuts of meat  Take the skin off chicken and turkey or pick skinless  Pick low-fat cheese, milk, and ice cream  Use liquid oils when cooking and baking, such as olive oil and canola oil  To help stay away from trans fat:  Look at your labels. Choose foods with 0% trans fat. Read the ingredient list. Avoid foods with partially hydrogenated oil in the ingredient list. This means there is trans fat in the product.  Where can I learn more?   American Heart Association   http://www.heart.org/HEARTORG/Conditions/Cholesterol/PreventionTreatmentofHighCholesterol/Know-Your-Fats_[facility]_305628_Article.jsp   Last Reviewed Date   2021-10-05  Consumer Information Use and Disclaimer   This information is not specific medical advice and does not replace information you receive from your health care provider. This is only a brief summary of general information. It does NOT include all information about conditions, illnesses, injuries, tests, procedures, treatments, therapies, discharge instructions or life-style choices that may apply to you. You must talk with your health care provider for complete information about your health and treatment options. This information should not be used to decide whether or not to  accept your health care providers advice, instructions or recommendations. Only your health care provider has the knowledge and training to provide advice that is right for you.   Copyright   Copyright © 2021 UpToDate, Inc. and its affiliates and/or licensors. All rights reserved.    Patient Education       Anxiety Discharge Instructions, Adult   About this topic   Anxiety can cause you to feel very worried. It can also cause physical symptoms like chest pain, stomach aches, or trouble sleeping. While mild anxiety is a normal response to stress, it can cause you problems in your everyday life. You may need follow-up care to help manage your anxiety. Anxiety happens in many forms, like:  Being scared all the time that something bad is going to happen. This is generalized anxiety.  Strong bursts of fear where your body has signs that may feel like a heart attack. This is called a panic attack.  Upsetting thoughts that happen often. There is a need to repeat doing certain things to help get rid of the anxiety caused by these thoughts. The thoughts or actions may be about checking on things, touching things, or worry about germs. This is an obsessive-compulsive disorder.  Strong fear of an object, place, or condition. This is a phobia.  Fear that others think bad things about you or being put down by other people. This is social anxiety.  Nightmares, flashbacks, staying away from people, or having panic attacks when reminded of a shocking or hurtful time or place from the past. This is post-traumatic stress.  Anxiety disorder may be treated in many ways. Some kinds of treatment have you talk about your beliefs, fears, and worries. You may learn how certain thoughts or feelings can raise anxiety. You may also learn what steps to take to lower anxiety. Other kinds of treatment may have you look back on a hurtful event, sad memory, or something you are afraid of. The doctor will help you deal with the feelings that you  may have. You may learn ways to cope with unwanted events or thoughts by looking at your fears in a way that feels safe.  What care is needed at home?   Ask your doctor what you need to do when you go home. Make sure you ask questions if you do not understand what the doctor says.  Set a time to talk with a counselor about your worries and feelings. This can help you with your anxiety.  Take care to follow all instructions when you take your medicines.  Limit alcohol and caffeine.  Learn ways to manage stress. Relaxation methods like reflection, deep breathing, and muscle relaxation may be helpful. Things like yoga, exercise, and mike chi are also good.  Talk about your feelings with family members and friends you trust. Talk to someone who can help you see how your thoughts at certain times may raise your anxiety.     What follow-up care is needed?   Your doctor may ask you to make visits to the office to check on your progress. Be sure to keep these visits.  What drugs may be needed?   The doctor may order drugs to help the physical signs of anxiety. Make sure that you take the drugs as taught to you by the doctor. Talk with your doctor about any side effects and ask how long you should take the drug.  Will physical activity be limited?   You may take part in physical activities. Some people are limited because of their anxiety or fear. Talk with your doctor about the right amount of activity for you.  What changes to diet are needed?   Eat a variety of healthy foods and limit drinks with caffeine. You should avoid alcohol, energy drinks, and over-the-counter stimulants.  What problems could happen?   If your anxiety is not treated, it can result in:  Staying away from work or social events  Not being able to do everyday tasks  Keeping away from family and friends  What can be done to prevent this health problem?   Learn what events, people, or things upset you. Limit your contact with them.  Talk about your  feelings. Talk to someone who can help you see how your thoughts at certain times may raise your anxiety.  Seek support from your friends and family. Find someone who calms you down. Ask if you can call them when you are getting anxious.  When do I need to call the doctor?   You feel you may harm yourself or someone else.  You can also call a mental health hotline for help.  You have any physical symptoms, such as chest pain, trouble breathing, or severe belly pain, that could be a sign of a serious problem.  If you are short of breath.  If you do not feel like you can be alone.  Teach Back: Helping You Understand   The Teach Back Method helps you understand the information we are giving you. After you talk with the staff, tell them in your own words what you learned. This helps to make sure the staff has described each thing clearly. It also helps to explain things that may have been confusing. Before going home, make sure you can do these:  I can tell you about my condition and the drugs I need to take.  I can tell you what may help lower my anxiety.  I can tell you what I will do if it is hard to breathe or I have chest pain.  I can tell you what I will do if I do not feel safe or cannot be alone.  Where can I learn more?   [employer]  https://www.gricelda.org/Learn-More/Mental-Health-Conditions/Anxiety-Disorders   National Health Service  https://www.nhs.uk/conditions/generalised-anxiety-disorder/symptoms/   [employer] ? [facility]  https://www.sudha.nih.gov/health/relieving-stress-anxiety-qivhygjdy-xsvuxigbxj-fdudpyaieg   National [employer] of Mental Health  http://www.nimh.nih.gov/health/publications/anxiety-disorders/complete-index.shtml   Last Reviewed Date   2021-06-08  Consumer Information Use and Disclaimer   This information is not specific medical advice and does not replace information you receive from your health care provider. This is only a brief summary of general information. It does NOT  include all information about conditions, illnesses, injuries, tests, procedures, treatments, therapies, discharge instructions or life-style choices that may apply to you. You must talk with your health care provider for complete information about your health and treatment options. This information should not be used to decide whether or not to accept your health care providers advice, instructions or recommendations. Only your health care provider has the knowledge and training to provide advice that is right for you.  Copyright   Copyright © 2021 [employer] and its affiliates and/or licensors. All rights reserved.    Patient Education     Attention Deficit Hyperactivity Disorder (ADHD) Discharge Instructions   About this topic   Attention deficit hyperactivity disorder is also called ADHD or ADD. Most often, this starts at a young age. This disorder makes it hard to focus or sit still. Children with ADHD may have trouble in school and at home. Adults may have problems at work. People with ADHD may also have problems with making decisions, time management, getting along well with others, or acting without thinking. While there is no cure for ADHD, you and your doctor can work on a plan that is best for you to treat the signs of ADHD.  What care is needed at home?   Ask your doctor what you need to do when you go home. Make sure you ask questions if you do not understand what the doctor says. This way you will know what you need to do.  Try to get enough sleep at night. Most often adults need 7 to 8 hours each night and children need 8 to 10 hours each night. Rest during the day if you are tired.  Eat and sleep at the same times every day.  Have a plan for where to keep things in your home. Use checklists, things to help you remember, and alarms. A list of things you may need to find in a hurry can be helpful. These can help you put things in the right place and manage your time.  Work on getting better at  reading and taking notes.  Have a space that is just for work or homework so you will be able to pay attention. This may be an office or a desk facing a wall. Try using headphones so you cannot hear the other noises.  Do one thing at a time. Keep a list of the next things you were planning to do or say.  Break large jobs or things you have to do into smaller jobs. This will make them easier to finish. Reward yourself along the way.  Have rules that are clear and easy to follow.   Look at where you are the strongest. Give rewards for good behavior, finished schoolwork, or for doing a good job at work.  Do not do too many things that might cause stress.  What follow-up care is needed?   Your doctor may ask you to make visits to the office to check on your progress. Be sure to keep these visits.  Your doctor may send you to a special mental health doctor. This person will talk with you about the problems you are having. Then, you can work together to find ways to help you manage them.  Your doctor may suggest you try talk therapy to help you live well with your ADHD.  What drugs may be needed?   The doctor may order drugs to:  Help lower your worry  Help you to pay attention  Help you be more calm  Help you be less impulsive  Help keep things in your life balanced  Care for low mood  Will physical activity be limited?   Physical activity will help keep your mind and body in good shape. Talk with your doctor about the right amount of activity for you.  What problems could happen?   Feeling badly about yourself  Raise the chances of you hurting yourself, such as injury in a car accident  Not do well in school and work  Trouble making friends or getting along well with others  Raise your chance to abuse alcohol or drugs  What can be done to prevent this health problem?   The cause of ADHD is not clear, but to lower the chance of your child having ADHD:  Do not drink beer, wine, or mixed drinks (alcohol) while you are  pregnant.  Do not smoke or use illegal drugs while you are pregnant.  Keep your child away from things like harmful toxins and chemicals.  Keep your child from having too much time in front of computers, TV, and video games.  Get plenty of exercise.  Be more aware of thoughts, emotions, and experiences each moment. This is mindfulness.  When do I need to call the doctor?   Drugs are not working  Symptoms are getting worse  Teach Back: Helping You Understand   The Teach Back Method helps you understand the information we are giving you. After you talk with the staff, tell them in your own words what you learned. This helps to make sure the staff has described each thing clearly. It also helps to explain things that may have been confusing. Before going home, make sure you can do these:  I can tell you about my condition.  I can tell you ways to help me pay attention.  I can tell you what I will do if I think my drugs are not working.  Last Reviewed Date   2024-05-01  Consumer Information Use and Disclaimer   This generalized information is a limited summary of diagnosis, treatment, and/or medication information. It is not meant to be comprehensive and should be used as a tool to help the user understand and/or assess potential diagnostic and treatment options. It does NOT include all information about conditions, treatments, medications, side effects, or risks that may apply to a specific patient. It is not intended to be medical advice or a substitute for the medical advice, diagnosis, or treatment of a health care provider based on the health care provider's examination and assessment of a patients specific and unique circumstances. Patients must speak with a health care provider for complete information about their health, medical questions, and treatment options, including any risks or benefits regarding use of medications. This information does not endorse any treatments or medications as safe, effective, or  approved for treating a specific patient. [employer] and its affiliates disclaim any warranty or liability relating to this information or the use thereof. The use of this information is governed by the Terms of Use, available at https://www.[employer].com/en/know/clinical-effectiveness-terms   Copyright   Copyright © 2024 [employer] and its affiliates and/or licensors. All rights reserved.          Risks, benefits, and alternatives discussed with patient, Patient verbalized understanding of discussed plan of care. Asked patient if any further questions, answered no.    Future Appointments   Date Time Provider Department Center   6/5/2025  3:40 PM Carline Camacho NP [facility]              Carline Camacho NP         [1]   Social History  Tobacco Use    Smoking status: Former     Types: Vaping with nicotine    Smokeless tobacco: Never   Substance Use Topics    Alcohol use: Yes    Drug use: Never   [2]   Patient Active Problem List  Diagnosis    Attention deficit hyperactivity disorder    Breast lump    Primary insomnia    Menopausal disorder    Generalized anxiety disorder    Superior glenoid labrum lesion of left shoulder    Complete rotator cuff tear or rupture of left shoulder, not specified as traumatic    Osteoarthritis of left AC (acromioclavicular) joint    Tendinopathy of left biceps tendon    Greater tuberosity of humerus fracture    Traumatic complete tear of left rotator cuff   [3]   Current Outpatient Medications   Medication Sig Dispense Refill    clonazePAM (KLONOPIN) 1 MG tablet Take 1 tablet (1 mg total) by mouth 2 (two) times daily as needed for Anxiety. 60 tablet 2    estradioL (ESTRACE) 1 MG tablet TAKE 1 TABLET BY MOUTH EVERY DAY 90 tablet 3    lisdexamfetamine (VYVANSE) 50 MG capsule Take 1 capsule (50 mg total) by mouth every morning. 30 capsule 0     No current facility-administered medications for this visit.

## 2025-03-04 NOTE — PATIENT INSTRUCTIONS
RTC in 3 months for F/U or sooner if needed.    Patient Education       Yearly Physical for Adults   About this topic   Most people do not want to be sick. Having a checkup each year with your doctor is one way to help you stay healthy. You may need to see your doctor more or less often. How often you need to go to the doctor depends on your age. Your family and medical history also play a role in how often you need to go to the doctor. Going to see your doctor on a routine basis can help you find problems early or even before they start. This may make it easier to treat or cure your problem.  General   Your doctor will talk about many things during your checkup. Your doctor may ask about:  Your medical and family history.  All the drugs you are taking. Be sure to include all prescription, over the counter, and herbal supplements. Tell the doctor if you have any drug allergy. Bring a list of drugs you take with you.  How you are feeling and if you are having any problems.  Risky behaviors like smoking, drinking alcohol, using illegal drugs, not wearing seatbelts, having unprotected sex, etc.  Your doctor will do a physical exam and may check your:  Height and weight  Blood pressure  Reflexes  Memory  Vision  Hearing  Your doctor may order:  Lab tests  ECG to check your heart rhythm  X-rays  Tests or treatments based on your exam  What lifestyle changes are needed?   Your doctor may suggest you make changes to your lifestyle at this visit. The doctor may talk with you about being more active or lowering stress levels. Ask your doctor what you need to do.  What drugs may be needed?   Your doctor may order drugs or vaccines to protect you from illnesses.  What changes to diet are needed?   Talk to your doctor to see if any changes are needed to your diet.  When do I need to call the doctor?   Call your doctor if you need to learn about any test results. Together you can make a plan for more care.  Helpful tips   Make a  list of questions for your doctor before you go. This will help you remember to ask about any concerns. Write down any answers from your doctor so you can look over them after your visit.   Tell your doctor about any changes in your body or health since your last visit.  Ask your doctor about any screening tests you need.  Where can I learn more?   American Academy of [employer]icians  http://familydoctor.org/familydoctor/en/prevention-wellness/staying-healthy/healthy-living/preventive-services-for-healthy-living.printerview.html   Centers for Disease Control  http://www.cdc.gov/family/checkup/   Last Reviewed Date   2019-04-22  Consumer Information Use and Disclaimer   This information is not specific medical advice and does not replace information you receive from your health care provider. This is only a brief summary of general information. It does NOT include all information about conditions, illnesses, injuries, tests, procedures, treatments, therapies, discharge instructions or life-style choices that may apply to you. You must talk with your health care provider for complete information about your health and treatment options. This information should not be used to decide whether or not to accept your health care providers advice, instructions or recommendations. Only your health care provider has the knowledge and training to provide advice that is right for you.  Copyright   Copyright © 2021 [employer]. and its affiliates and/or licensors. All rights reserved.    Patient Education       Low Cholesterol, Saturated Fat, and Trans Fat Diet   About this topic   Cholesterol, saturated fat, and trans fat are in many foods. These may raise your blood cholesterol levels. If your cholesterol is too high, this can cause health problems in your heart, liver, kidneys, and even your eyes. The key to lowering your risk of heart problems is to lower your bad fat intake.  Saturated fats and trans fats are the bad  "fats. These fats clog your arteries and raise your bad cholesterol. Saturated fats and trans fats are solid fats at room temperature. Saturated fats are animal fats. Trans fats are manmade fats. They add flavor to a lot of packaged foods. Staying away from saturated and trans fats will help your heart.  When you do eat foods with fat, make sure they have the good fats. Monounsaturated and polyunsaturated fats are good fats. These fats help raise your good cholesterol and protect your heart.  General   How to Lower Fat and Cholesterol in Your Diet   Read the labels of the foods you buy from the market to find out how much fat is present. Under 5% of total fat on a label means it is "low fat". Over 20% of total fat on a label means it is high fat.  Eat high fiber foods, like soluble fiber. This type of fiber helps lower cholesterol in the body. Choose oatmeal, fruits (like apples), beans, and nuts to get the most soluble fiber.  Eat foods high in omega-3 fatty acids like sha seeds, walnuts, salmon, tuna, trout, herring, flaxseed, and soybeans. These foods help keep the heart healthy.  Limit your bad fat and oil intake.  Stay away from butter, stick margarine, shortening, lard, and palm and coconut oil. Pick plant-based spreads instead.  Limit mayonnaise, salad dressings, gravies, and sauces, unless it is made from low-fat ingredients.  Limit chocolate.  Do not eat high-fat processed foods like hot dogs, luna, sausage, ham and other luncheon meats high in fat, and some frozen foods. Pick fish, chicken, turkey, and lean meats instead.  Eat more dried beans, lentils, and tofu to get your protein.  Do not eat organ meats, like liver.  Choose nonfat or low-fat milk, yogurt, and cheese.  Use light or fat-free cream cheese and sour cream.  Eat lots of fruits and vegetables.  Pick whole grain breads, cereals, pastas, and rice.  Do not eat snacks that are high in fats like granola, cookies, pies, pastries, doughnuts, and " croissants.  Stay away from deep fried foods.  Help When Cooking   Remove the fat portion of meats and the skin from poultry before cooking.  Bake, broil, grill, poach, or roast poultry, fish, and lean meats.  Drain and throw away the fat that drains out of meat as you cook it.  Try to add little or no fat to foods.  Use olive or canola oil for cooking or baking.  Steam your vegetables.  Use herbs or no-oil marinades to flavor foods.         Who should use this diet?   This diet is for people who are at high risk of getting health problems like heart disease, high blood pressure, diabetes, and others. This diet is also good for all people to follow to keep your heart healthy.  What foods are good to eat?   Foods with good fats are:  Canola, peanut, and olive oil  Safflower, soybean, and corn oil  Walnuts, almonds, cashews, and peanuts  Pumpkin and sunflower seeds  Pelham and tuna  Tofu  Soymilk  Avocado  What foods should be limited or avoided?   Stay away from these types of foods that have saturated fats:  Whole fat dairy products like cheese, ice cream, whole milk, and cream  Palm and coconut oils  High-fat meats like beef, lamb, poultry with the skin, luna, and sausage  Butter and lard  Stay away from these types of foods that may have trans fat:  Cookies, cakes, candy, doughnuts, baked goods, muffins, pizza dough, and pie crusts that are packaged  Fried foods  Frozen dinners  Chips and crackers  Microwave popcorn  Stick margarine and vegetable shortenings  Helpful tips   To help stay away from saturated fat:  Pick lean cuts of meat  Take the skin off chicken and turkey or pick skinless  Pick low-fat cheese, milk, and ice cream  Use liquid oils when cooking and baking, such as olive oil and canola oil  To help stay away from trans fat:  Look at your labels. Choose foods with 0% trans fat. Read the ingredient list. Avoid foods with partially hydrogenated oil in the ingredient list. This means there is trans fat  in the product.  Where can I learn more?   American Heart Association   http://www.heart.org/HEARTORG/Conditions/Cholesterol/PreventionTreatmentofHighCholesterol/Know-Your-Fats_[facility]_305628_Article.jsp   Last Reviewed Date   2021-10-05  Consumer Information Use and Disclaimer   This information is not specific medical advice and does not replace information you receive from your health care provider. This is only a brief summary of general information. It does NOT include all information about conditions, illnesses, injuries, tests, procedures, treatments, therapies, discharge instructions or life-style choices that may apply to you. You must talk with your health care provider for complete information about your health and treatment options. This information should not be used to decide whether or not to accept your health care providers advice, instructions or recommendations. Only your health care provider has the knowledge and training to provide advice that is right for you.   Copyright   Copyright © 2021 [employer], Inc. and its affiliates and/or licensors. All rights reserved.    Patient Education       Anxiety Discharge Instructions, Adult   About this topic   Anxiety can cause you to feel very worried. It can also cause physical symptoms like chest pain, stomach aches, or trouble sleeping. While mild anxiety is a normal response to stress, it can cause you problems in your everyday life. You may need follow-up care to help manage your anxiety. Anxiety happens in many forms, like:  Being scared all the time that something bad is going to happen. This is generalized anxiety.  Strong bursts of fear where your body has signs that may feel like a heart attack. This is called a panic attack.  Upsetting thoughts that happen often. There is a need to repeat doing certain things to help get rid of the anxiety caused by these thoughts. The thoughts or actions may be about checking on things, touching things, or worry  about germs. This is an obsessive-compulsive disorder.  Strong fear of an object, place, or condition. This is a phobia.  Fear that others think bad things about you or being put down by other people. This is social anxiety.  Nightmares, flashbacks, staying away from people, or having panic attacks when reminded of a shocking or hurtful time or place from the past. This is post-traumatic stress.  Anxiety disorder may be treated in many ways. Some kinds of treatment have you talk about your beliefs, fears, and worries. You may learn how certain thoughts or feelings can raise anxiety. You may also learn what steps to take to lower anxiety. Other kinds of treatment may have you look back on a hurtful event, sad memory, or something you are afraid of. The doctor will help you deal with the feelings that you may have. You may learn ways to cope with unwanted events or thoughts by looking at your fears in a way that feels safe.  What care is needed at home?   Ask your doctor what you need to do when you go home. Make sure you ask questions if you do not understand what the doctor says.  Set a time to talk with a counselor about your worries and feelings. This can help you with your anxiety.  Take care to follow all instructions when you take your medicines.  Limit alcohol and caffeine.  Learn ways to manage stress. Relaxation methods like reflection, deep breathing, and muscle relaxation may be helpful. Things like yoga, exercise, and mike chi are also good.  Talk about your feelings with family members and friends you trust. Talk to someone who can help you see how your thoughts at certain times may raise your anxiety.     What follow-up care is needed?   Your doctor may ask you to make visits to the office to check on your progress. Be sure to keep these visits.  What drugs may be needed?   The doctor may order drugs to help the physical signs of anxiety. Make sure that you take the drugs as taught to you by the doctor.  Talk with your doctor about any side effects and ask how long you should take the drug.  Will physical activity be limited?   You may take part in physical activities. Some people are limited because of their anxiety or fear. Talk with your doctor about the right amount of activity for you.  What changes to diet are needed?   Eat a variety of healthy foods and limit drinks with caffeine. You should avoid alcohol, energy drinks, and over-the-counter stimulants.  What problems could happen?   If your anxiety is not treated, it can result in:  Staying away from work or social events  Not being able to do everyday tasks  Keeping away from family and friends  What can be done to prevent this health problem?   Learn what events, people, or things upset you. Limit your contact with them.  Talk about your feelings. Talk to someone who can help you see how your thoughts at certain times may raise your anxiety.  Seek support from your friends and family. Find someone who calms you down. Ask if you can call them when you are getting anxious.  When do I need to call the doctor?   You feel you may harm yourself or someone else.  You can also call a mental health hotline for help.  You have any physical symptoms, such as chest pain, trouble breathing, or severe belly pain, that could be a sign of a serious problem.  If you are short of breath.  If you do not feel like you can be alone.  Teach Back: Helping You Understand   The Teach Back Method helps you understand the information we are giving you. After you talk with the staff, tell them in your own words what you learned. This helps to make sure the staff has described each thing clearly. It also helps to explain things that may have been confusing. Before going home, make sure you can do these:  I can tell you about my condition and the drugs I need to take.  I can tell you what may help lower my anxiety.  I can tell you what I will do if it is hard to breathe or I have chest  pain.  I can tell you what I will do if I do not feel safe or cannot be alone.  Where can I learn more?   [employer]  https://www.gricelda.org/Learn-More/Mental-Health-Conditions/Anxiety-Disorders   National Health Service  https://www.nhs.uk/conditions/generalised-anxiety-disorder/symptoms/   [place] of Health ? [facility]  https://www.sudha.nih.gov/health/relieving-stress-anxiety-fhlenzgbc-gqrrfjjwkl-bkdiysghff   [place] of Mental Health  http://www.[facility].nih.gov/health/publications/anxiety-disorders/complete-index.shtml   Last Reviewed Date   2021-06-08  Consumer Information Use and Disclaimer   This information is not specific medical advice and does not replace information you receive from your health care provider. This is only a brief summary of general information. It does NOT include all information about conditions, illnesses, injuries, tests, procedures, treatments, therapies, discharge instructions or life-style choices that may apply to you. You must talk with your health care provider for complete information about your health and treatment options. This information should not be used to decide whether or not to accept your health care providers advice, instructions or recommendations. Only your health care provider has the knowledge and training to provide advice that is right for you.  Copyright   Copyright © 2021 UpToDate, Inc. and its affiliates and/or licensors. All rights reserved.    Patient Education     Attention Deficit Hyperactivity Disorder (ADHD) Discharge Instructions   About this topic   Attention deficit hyperactivity disorder is also called ADHD or ADD. Most often, this starts at a young age. This disorder makes it hard to focus or sit still. Children with ADHD may have trouble in school and at home. Adults may have problems at work. People with ADHD may also have problems with making decisions, time management, getting along well with others, or acting without thinking.  While there is no cure for ADHD, you and your doctor can work on a plan that is best for you to treat the signs of ADHD.  What care is needed at home?   Ask your doctor what you need to do when you go home. Make sure you ask questions if you do not understand what the doctor says. This way you will know what you need to do.  Try to get enough sleep at night. Most often adults need 7 to 8 hours each night and children need 8 to 10 hours each night. Rest during the day if you are tired.  Eat and sleep at the same times every day.  Have a plan for where to keep things in your home. Use checklists, things to help you remember, and alarms. A list of things you may need to find in a hurry can be helpful. These can help you put things in the right place and manage your time.  Work on getting better at reading and taking notes.  Have a space that is just for work or homework so you will be able to pay attention. This may be an office or a desk facing a wall. Try using headphones so you cannot hear the other noises.  Do one thing at a time. Keep a list of the next things you were planning to do or say.  Break large jobs or things you have to do into smaller jobs. This will make them easier to finish. Reward yourself along the way.  Have rules that are clear and easy to follow.   Look at where you are the strongest. Give rewards for good behavior, finished schoolwork, or for doing a good job at work.  Do not do too many things that might cause stress.  What follow-up care is needed?   Your doctor may ask you to make visits to the office to check on your progress. Be sure to keep these visits.  Your doctor may send you to a special mental health doctor. This person will talk with you about the problems you are having. Then, you can work together to find ways to help you manage them.  Your doctor may suggest you try talk therapy to help you live well with your ADHD.  What drugs may be needed?   The doctor may order drugs  to:  Help lower your worry  Help you to pay attention  Help you be more calm  Help you be less impulsive  Help keep things in your life balanced  Care for low mood  Will physical activity be limited?   Physical activity will help keep your mind and body in good shape. Talk with your doctor about the right amount of activity for you.  What problems could happen?   Feeling badly about yourself  Raise the chances of you hurting yourself, such as injury in a car accident  Not do well in school and work  Trouble making friends or getting along well with others  Raise your chance to abuse alcohol or drugs  What can be done to prevent this health problem?   The cause of ADHD is not clear, but to lower the chance of your child having ADHD:  Do not drink beer, wine, or mixed drinks (alcohol) while you are pregnant.  Do not smoke or use illegal drugs while you are pregnant.  Keep your child away from things like harmful toxins and chemicals.  Keep your child from having too much time in front of computers, TV, and video games.  Get plenty of exercise.  Be more aware of thoughts, emotions, and experiences each moment. This is mindfulness.  When do I need to call the doctor?   Drugs are not working  Symptoms are getting worse  Teach Back: Helping You Understand   The Teach Back Method helps you understand the information we are giving you. After you talk with the staff, tell them in your own words what you learned. This helps to make sure the staff has described each thing clearly. It also helps to explain things that may have been confusing. Before going home, make sure you can do these:  I can tell you about my condition.  I can tell you ways to help me pay attention.  I can tell you what I will do if I think my drugs are not working.  Last Reviewed Date   2024-05-01  Consumer Information Use and Disclaimer   This generalized information is a limited summary of diagnosis, treatment, and/or medication information. It is not  meant to be comprehensive and should be used as a tool to help the user understand and/or assess potential diagnostic and treatment options. It does NOT include all information about conditions, treatments, medications, side effects, or risks that may apply to a specific patient. It is not intended to be medical advice or a substitute for the medical advice, diagnosis, or treatment of a health care provider based on the health care provider's examination and assessment of a patients specific and unique circumstances. Patients must speak with a health care provider for complete information about their health, medical questions, and treatment options, including any risks or benefits regarding use of medications. This information does not endorse any treatments or medications as safe, effective, or approved for treating a specific patient. UpToDate, Inc. and its affiliates disclaim any warranty or liability relating to this information or the use thereof. The use of this information is governed by the Terms of Use, available at https://www.woltersCloudsnaper.com/en/know/clinical-effectiveness-terms   Copyright   Copyright © 2024 [employer]Date, [employer]. and its affiliates and/or licensors. All rights reserved.

## 2025-03-17 DIAGNOSIS — F90.2 ATTENTION DEFICIT HYPERACTIVITY DISORDER (ADHD), COMBINED TYPE: ICD-10-CM

## 2025-03-17 RX ORDER — LISDEXAMFETAMINE DIMESYLATE 50 MG/1
50 CAPSULE ORAL EVERY MORNING
Qty: 30 CAPSULE | Refills: 0 | Status: SHIPPED | OUTPATIENT
Start: 2025-03-17

## 2025-03-20 DIAGNOSIS — N95.9 MENOPAUSAL DISORDER: ICD-10-CM

## 2025-03-20 RX ORDER — ESTRADIOL 1 MG/1
1 TABLET ORAL
Qty: 90 TABLET | Refills: 3 | Status: SHIPPED | OUTPATIENT
Start: 2025-03-20

## 2025-04-17 DIAGNOSIS — F90.2 ATTENTION DEFICIT HYPERACTIVITY DISORDER (ADHD), COMBINED TYPE: ICD-10-CM

## 2025-04-17 RX ORDER — LISDEXAMFETAMINE DIMESYLATE 50 MG/1
50 CAPSULE ORAL EVERY MORNING
Qty: 30 CAPSULE | Refills: 0 | Status: SHIPPED | OUTPATIENT
Start: 2025-04-17

## 2025-04-25 DIAGNOSIS — F41.1 GENERALIZED ANXIETY DISORDER: ICD-10-CM

## 2025-04-25 RX ORDER — CLONAZEPAM 1 MG/1
1 TABLET ORAL 2 TIMES DAILY PRN
Qty: 60 TABLET | Refills: 2 | Status: SHIPPED | OUTPATIENT
Start: 2025-04-25

## 2025-05-20 DIAGNOSIS — F90.2 ATTENTION DEFICIT HYPERACTIVITY DISORDER (ADHD), COMBINED TYPE: ICD-10-CM

## 2025-05-20 RX ORDER — LISDEXAMFETAMINE DIMESYLATE 50 MG/1
50 CAPSULE ORAL EVERY MORNING
Qty: 30 CAPSULE | Refills: 0 | Status: SHIPPED | OUTPATIENT
Start: 2025-05-20

## 2025-06-05 ENCOUNTER — OFFICE VISIT (OUTPATIENT)
Dept: PRIMARY CARE CLINIC | Facility: CLINIC | Age: 60
End: 2025-06-05
Payer: COMMERCIAL

## 2025-06-05 VITALS
BODY MASS INDEX: 21.75 KG/M2 | RESPIRATION RATE: 18 BRPM | WEIGHT: 115.19 LBS | OXYGEN SATURATION: 98 % | SYSTOLIC BLOOD PRESSURE: 116 MMHG | DIASTOLIC BLOOD PRESSURE: 78 MMHG | HEIGHT: 61 IN | HEART RATE: 86 BPM

## 2025-06-05 DIAGNOSIS — N95.9 MENOPAUSAL DISORDER: ICD-10-CM

## 2025-06-05 DIAGNOSIS — F41.1 GENERALIZED ANXIETY DISORDER: ICD-10-CM

## 2025-06-05 DIAGNOSIS — F90.2 ATTENTION DEFICIT HYPERACTIVITY DISORDER (ADHD), COMBINED TYPE: ICD-10-CM

## 2025-06-05 DIAGNOSIS — E78.2 MIXED HYPERLIPIDEMIA: Primary | ICD-10-CM

## 2025-06-05 PROCEDURE — 3074F SYST BP LT 130 MM HG: CPT | Mod: CPTII,,, | Performed by: NURSE PRACTITIONER

## 2025-06-05 PROCEDURE — 3008F BODY MASS INDEX DOCD: CPT | Mod: CPTII,,, | Performed by: NURSE PRACTITIONER

## 2025-06-05 PROCEDURE — 1159F MED LIST DOCD IN RCRD: CPT | Mod: CPTII,,, | Performed by: NURSE PRACTITIONER

## 2025-06-05 PROCEDURE — 1160F RVW MEDS BY RX/DR IN RCRD: CPT | Mod: CPTII,,, | Performed by: NURSE PRACTITIONER

## 2025-06-05 PROCEDURE — 99214 OFFICE O/P EST MOD 30 MIN: CPT | Mod: S$PBB,,, | Performed by: NURSE PRACTITIONER

## 2025-06-05 PROCEDURE — 3078F DIAST BP <80 MM HG: CPT | Mod: CPTII,,, | Performed by: NURSE PRACTITIONER

## 2025-06-05 NOTE — PATIENT INSTRUCTIONS
"RTC in 3 months for F/U or sooner if needed.    Keep appts with specialists as scheduled.    Patient Education       Low Cholesterol, Saturated Fat, and Trans Fat Diet   About this topic   Cholesterol, saturated fat, and trans fat are in many foods. These may raise your blood cholesterol levels. If your cholesterol is too high, this can cause health problems in your heart, liver, kidneys, and even your eyes. The key to lowering your risk of heart problems is to lower your bad fat intake.  Saturated fats and trans fats are the bad fats. These fats clog your arteries and raise your bad cholesterol. Saturated fats and trans fats are solid fats at room temperature. Saturated fats are animal fats. Trans fats are manmade fats. They add flavor to a lot of packaged foods. Staying away from saturated and trans fats will help your heart.  When you do eat foods with fat, make sure they have the good fats. Monounsaturated and polyunsaturated fats are good fats. These fats help raise your good cholesterol and protect your heart.  General   How to Lower Fat and Cholesterol in Your Diet   Read the labels of the foods you buy from the market to find out how much fat is present. Under 5% of total fat on a label means it is "low fat". Over 20% of total fat on a label means it is high fat.  Eat high fiber foods, like soluble fiber. This type of fiber helps lower cholesterol in the body. Choose oatmeal, fruits (like apples), beans, and nuts to get the most soluble fiber.  Eat foods high in omega-3 fatty acids like sha seeds, walnuts, salmon, tuna, trout, herring, flaxseed, and soybeans. These foods help keep the heart healthy.  Limit your bad fat and oil intake.  Stay away from butter, stick margarine, shortening, lard, and palm and coconut oil. Pick plant-based spreads instead.  Limit mayonnaise, salad dressings, gravies, and sauces, unless it is made from low-fat ingredients.  Limit chocolate.  Do not eat high-fat processed foods " like hot dogs, luna, sausage, ham and other luncheon meats high in fat, and some frozen foods. Pick fish, chicken, turkey, and lean meats instead.  Eat more dried beans, lentils, and tofu to get your protein.  Do not eat organ meats, like liver.  Choose nonfat or low-fat milk, yogurt, and cheese.  Use light or fat-free cream cheese and sour cream.  Eat lots of fruits and vegetables.  Pick whole grain breads, cereals, pastas, and rice.  Do not eat snacks that are high in fats like granola, cookies, pies, pastries, doughnuts, and croissants.  Stay away from deep fried foods.  Help When Cooking   Remove the fat portion of meats and the skin from poultry before cooking.  Bake, broil, grill, poach, or roast poultry, fish, and lean meats.  Drain and throw away the fat that drains out of meat as you cook it.  Try to add little or no fat to foods.  Use olive or canola oil for cooking or baking.  Steam your vegetables.  Use herbs or no-oil marinades to flavor foods.         Who should use this diet?   This diet is for people who are at high risk of getting health problems like heart disease, high blood pressure, diabetes, and others. This diet is also good for all people to follow to keep your heart healthy.  What foods are good to eat?   Foods with good fats are:  Canola, peanut, and olive oil  Safflower, soybean, and corn oil  Walnuts, almonds, cashews, and peanuts  Pumpkin and sunflower seeds  [place] and tuna  Tofu  Soymilk  Avocado  What foods should be limited or avoided?   Stay away from these types of foods that have saturated fats:  Whole fat dairy products like cheese, ice cream, whole milk, and cream  Palm and coconut oils  High-fat meats like beef, lamb, poultry with the skin, luna, and sausage  Butter and lard  Stay away from these types of foods that may have trans fat:  Cookies, cakes, candy, doughnuts, baked goods, muffins, pizza dough, and pie crusts that are packaged  Fried foods  Frozen dinners  Chips and  crackers  Microwave popcorn  Stick margarine and vegetable shortenings  Helpful tips   To help stay away from saturated fat:  Pick lean cuts of meat  Take the skin off chicken and turkey or pick skinless  Pick low-fat cheese, milk, and ice cream  Use liquid oils when cooking and baking, such as olive oil and canola oil  To help stay away from trans fat:  Look at your labels. Choose foods with 0% trans fat. Read the ingredient list. Avoid foods with partially hydrogenated oil in the ingredient list. This means there is trans fat in the product.  Where can I learn more?   American Heart Association   http://www.heart.org/HEARTORG/Conditions/Cholesterol/PreventionTreatmentofHighCholesterol/Know-Your-Fats_[facility]_305628_Article.jsp   Last Reviewed Date   2021-10-05  Consumer Information Use and Disclaimer   This information is not specific medical advice and does not replace information you receive from your health care provider. This is only a brief summary of general information. It does NOT include all information about conditions, illnesses, injuries, tests, procedures, treatments, therapies, discharge instructions or life-style choices that may apply to you. You must talk with your health care provider for complete information about your health and treatment options. This information should not be used to decide whether or not to accept your health care providers advice, instructions or recommendations. Only your health care provider has the knowledge and training to provide advice that is right for you.   Copyright   Copyright © 2021 UpTo[employer], Inc. and its affiliates and/or licensors. All rights reserved.      Patient Education       Anxiety Discharge Instructions, Adult   About this topic   Anxiety can cause you to feel very worried. It can also cause physical symptoms like chest pain, stomach aches, or trouble sleeping. While mild anxiety is a normal response to stress, it can cause you problems in your everyday  life. You may need follow-up care to help manage your anxiety. Anxiety happens in many forms, like:  Being scared all the time that something bad is going to happen. This is generalized anxiety.  Strong bursts of fear where your body has signs that may feel like a heart attack. This is called a panic attack.  Upsetting thoughts that happen often. There is a need to repeat doing certain things to help get rid of the anxiety caused by these thoughts. The thoughts or actions may be about checking on things, touching things, or worry about germs. This is an obsessive-compulsive disorder.  Strong fear of an object, place, or condition. This is a phobia.  Fear that others think bad things about you or being put down by other people. This is social anxiety.  Nightmares, flashbacks, staying away from people, or having panic attacks when reminded of a shocking or hurtful time or place from the past. This is post-traumatic stress.  Anxiety disorder may be treated in many ways. Some kinds of treatment have you talk about your beliefs, fears, and worries. You may learn how certain thoughts or feelings can raise anxiety. You may also learn what steps to take to lower anxiety. Other kinds of treatment may have you look back on a hurtful event, sad memory, or something you are afraid of. The doctor will help you deal with the feelings that you may have. You may learn ways to cope with unwanted events or thoughts by looking at your fears in a way that feels safe.  What care is needed at home?   Ask your doctor what you need to do when you go home. Make sure you ask questions if you do not understand what the doctor says.  Set a time to talk with a counselor about your worries and feelings. This can help you with your anxiety.  Take care to follow all instructions when you take your medicines.  Limit alcohol and caffeine.  Learn ways to manage stress. Relaxation methods like reflection, deep breathing, and muscle relaxation may be  helpful. Things like yoga, exercise, and mike chi are also good.  Talk about your feelings with family members and friends you trust. Talk to someone who can help you see how your thoughts at certain times may raise your anxiety.     What follow-up care is needed?   Your doctor may ask you to make visits to the office to check on your progress. Be sure to keep these visits.  What drugs may be needed?   The doctor may order drugs to help the physical signs of anxiety. Make sure that you take the drugs as taught to you by the doctor. Talk with your doctor about any side effects and ask how long you should take the drug.  Will physical activity be limited?   You may take part in physical activities. Some people are limited because of their anxiety or fear. Talk with your doctor about the right amount of activity for you.  What changes to diet are needed?   Eat a variety of healthy foods and limit drinks with caffeine. You should avoid alcohol, energy drinks, and over-the-counter stimulants.  What problems could happen?   If your anxiety is not treated, it can result in:  Staying away from work or social events  Not being able to do everyday tasks  Keeping away from family and friends  What can be done to prevent this health problem?   Learn what events, people, or things upset you. Limit your contact with them.  Talk about your feelings. Talk to someone who can help you see how your thoughts at certain times may raise your anxiety.  Seek support from your friends and family. Find someone who calms you down. Ask if you can call them when you are getting anxious.  When do I need to call the doctor?   You feel you may harm yourself or someone else.  You can also call a mental health hotline for help.  You have any physical symptoms, such as chest pain, trouble breathing, or severe belly pain, that could be a sign of a serious problem.  If you are short of breath.  If you do not feel like you can be alone.  Teach Back:  Helping You Understand   The Teach Back Method helps you understand the information we are giving you. After you talk with the staff, tell them in your own words what you learned. This helps to make sure the staff has described each thing clearly. It also helps to explain things that may have been confusing. Before going home, make sure you can do these:  I can tell you about my condition and the drugs I need to take.  I can tell you what may help lower my anxiety.  I can tell you what I will do if it is hard to breathe or I have chest pain.  I can tell you what I will do if I do not feel safe or cannot be alone.  Where can I learn more?   [employer]  https://www.gricelda.org/Learn-More/Mental-Health-Conditions/Anxiety-Disorders   National Health Service  https://www.nhs.uk/conditions/generalised-anxiety-disorder/symptoms/   [place] [facility] ? [facility]  https://www.sudha.nih.gov/health/relieving-stress-anxiety-elndzoavm-wqhyhzhfmt-xlddqrewsr   [place] of Mental Health  http://www.nimh.nih.gov/health/publications/anxiety-disorders/complete-index.shtml   Last Reviewed Date   2021-06-08  Consumer Information Use and Disclaimer   This information is not specific medical advice and does not replace information you receive from your health care provider. This is only a brief summary of general information. It does NOT include all information about conditions, illnesses, injuries, tests, procedures, treatments, therapies, discharge instructions or life-style choices that may apply to you. You must talk with your health care provider for complete information about your health and treatment options. This information should not be used to decide whether or not to accept your health care providers advice, instructions or recommendations. Only your health care provider has the knowledge and training to provide advice that is right for you.  Copyright   Copyright © 2021 UpToDate, Inc. and its affiliates and/or  licensors. All rights reserved.      Patient Education     Attention Deficit Hyperactivity Disorder (ADHD) Discharge Instructions   About this topic   Attention deficit hyperactivity disorder is also called ADHD or ADD. Most often, this starts at a young age. This disorder makes it hard to focus or sit still. Children with ADHD may have trouble in school and at home. Adults may have problems at work. People with ADHD may also have problems with making decisions, time management, getting along well with others, or acting without thinking. While there is no cure for ADHD, you and your doctor can work on a plan that is best for you to treat the signs of ADHD.  What care is needed at home?   Ask your doctor what you need to do when you go home. Make sure you ask questions if you do not understand what the doctor says. This way you will know what you need to do.  Try to get enough sleep at night. Most often adults need 7 to 8 hours each night and children need 8 to 10 hours each night. Rest during the day if you are tired.  Eat and sleep at the same times every day.  Have a plan for where to keep things in your home. Use checklists, things to help you remember, and alarms. A list of things you may need to find in a hurry can be helpful. These can help you put things in the right place and manage your time.  Work on getting better at reading and taking notes.  Have a space that is just for work or homework so you will be able to pay attention. This may be an office or a desk facing a wall. Try using headphones so you cannot hear the other noises.  Do one thing at a time. Keep a list of the next things you were planning to do or say.  Break large jobs or things you have to do into smaller jobs. This will make them easier to finish. Reward yourself along the way.  Have rules that are clear and easy to follow.   Look at where you are the strongest. Give rewards for good behavior, finished schoolwork, or for doing a good job  at work.  Do not do too many things that might cause stress.  What follow-up care is needed?   Your doctor may ask you to make visits to the office to check on your progress. Be sure to keep these visits.  Your doctor may send you to a special mental health doctor. This person will talk with you about the problems you are having. Then, you can work together to find ways to help you manage them.  Your doctor may suggest you try talk therapy to help you live well with your ADHD.  What drugs may be needed?   The doctor may order drugs to:  Help lower your worry  Help you to pay attention  Help you be more calm  Help you be less impulsive  Help keep things in your life balanced  Care for low mood  Will physical activity be limited?   Physical activity will help keep your mind and body in good shape. Talk with your doctor about the right amount of activity for you.  What problems could happen?   Feeling badly about yourself  Raise the chances of you hurting yourself, such as injury in a car accident  Not do well in school and work  Trouble making friends or getting along well with others  Raise your chance to abuse alcohol or drugs  What can be done to prevent this health problem?   The cause of ADHD is not clear, but to lower the chance of your child having ADHD:  Do not drink beer, wine, or mixed drinks (alcohol) while you are pregnant.  Do not smoke or use illegal drugs while you are pregnant.  Keep your child away from things like harmful toxins and chemicals.  Keep your child from having too much time in front of computers, TV, and video games.  Get plenty of exercise.  Be more aware of thoughts, emotions, and experiences each moment. This is mindfulness.  When do I need to call the doctor?   Drugs are not working  Symptoms are getting worse  Teach Back: Helping You Understand   The Teach Back Method helps you understand the information we are giving you. After you talk with the staff, tell them in your own words  what you learned. This helps to make sure the staff has described each thing clearly. It also helps to explain things that may have been confusing. Before going home, make sure you can do these:  I can tell you about my condition.  I can tell you ways to help me pay attention.  I can tell you what I will do if I think my drugs are not working.  Last Reviewed Date   2024-05-01  Consumer Information Use and Disclaimer   This generalized information is a limited summary of diagnosis, treatment, and/or medication information. It is not meant to be comprehensive and should be used as a tool to help the user understand and/or assess potential diagnostic and treatment options. It does NOT include all information about conditions, treatments, medications, side effects, or risks that may apply to a specific patient. It is not intended to be medical advice or a substitute for the medical advice, diagnosis, or treatment of a health care provider based on the health care provider's examination and assessment of a patients specific and unique circumstances. Patients must speak with a health care provider for complete information about their health, medical questions, and treatment options, including any risks or benefits regarding use of medications. This information does not endorse any treatments or medications as safe, effective, or approved for treating a specific patient. UpToDate, Inc. and its affiliates disclaim any warranty or liability relating to this information or the use thereof. The use of this information is governed by the Terms of Use, available at https://www.[employer].com/en/know/clinical-effectiveness-terms   Copyright   Copyright © 2024 UpToDate, Inc. and its affiliates and/or licensors. All rights reserved.

## 2025-06-05 NOTE — PROGRESS NOTES
Subjective:       Patient ID: Isabella Santana is a 59 y.o. female.    Chief Complaint: Follow-up (3 Month F/U)    60 y/o female presents for F/U.    Ms. Santana feels well today but she does still have left shoulder pain which she had RPR procedure to her left shoulder after being found to have a rotator cuff injury. She initially received a steroid shot to her left shoulder by Dr. Medina and has completed 11 physical therapy sessions with [facility] so far out of the 12 sessions ordered. She was seen by Dr. Medina last week and he ordered 12 more sessions of PT due to Ms. Santana still having pain to her left shoulder and some stiffness with certain movements. Ms. Santana previously had rotator cuff injury to her left shoulder last year by Dr. Medina which she tolerated well.    HLD - On last annual blood work drawn last year, her cholesterol came back elevated, LDL was 102 so she has cut back on eating fried foods.     Anxiety - chronic, controlled well on clonazepam as needed. Denies se/ar to medication. Denies being depressed. Denies SI/HI.     ADHD - controlled with Vyvanse 50 mg daily. No s/e of CP, palpitations, syncope, Denies se/ar to medication.  checked and is appropriate.    Patient had a total hysterectomy in 2012 so she takes Estradiol daily which works well. Mammogram due but she has been unable to get it done right now due to her left shoulder injury.    Cologuard done in September and was negative.    Denies smoking or drinking.                      Past Medical History:   Diagnosis Date    ADHD (attention deficit hyperactivity disorder)     Anxiety     Hormone deficiency     Insomnia        Past Surgical History:   Procedure Laterality Date    BREAST BIOPSY Left     HYSTERECTOMY      ROTATOR CUFF REPAIR Left 01/2023    WISDOM TOOTH EXTRACTION         Family History   Problem Relation Name Age of Onset    Diabetes Mother Mom     Hearing loss Mother Mom         deaf    Cancer  "Father pop         colon/skin non fatal    Heart disease Father pop     Kidney disease Father pop         cysts    Cancer Sister #1         Kidney cancer/passed away    Cancer Maternal Aunt [place]         Throat cancer/passed    Depression Daughter A     Miscarriages / Stillbirths Daughter Ray     Cancer Sister Jayla         Pancreatic cancer       Social History[1]    Problem List[2]    Immunization History   Administered Date(s) Administered    COVID-19, MRNA, LN-S, PF (Pfizer) (Purple Cap) 07/27/2021, 08/17/2021    Influenza - Quadrivalent - PF *Preferred* (6 months and older) 10/05/2020, 09/23/2021, 10/24/2022, 10/06/2023    Influenza - Trivalent - Fluarix, Flulaval, Fluzone, Afluria - PF 09/27/2024           Review of Systems   Constitutional:  Negative for activity change, appetite change, chills, diaphoresis, fatigue and fever.   HENT:  Negative for tinnitus and trouble swallowing.    Eyes:  Negative for visual disturbance.   Respiratory:  Negative for cough, chest tightness, shortness of breath and wheezing.    Cardiovascular:  Negative for chest pain, palpitations and leg swelling.   Gastrointestinal:  Negative for abdominal distention, abdominal pain, blood in stool, constipation, diarrhea and nausea.   Genitourinary:  Negative for decreased urine volume, dysuria, frequency, hematuria and urgency.   Musculoskeletal:  Positive for arthralgias (left shoulder, chronic). Negative for back pain, gait problem and neck pain.   Skin:  Negative for color change and rash.   Neurological:  Negative for dizziness, syncope, weakness, light-headedness, numbness and headaches.   Psychiatric/Behavioral:  Negative for behavioral problems, confusion and dysphoric mood. The patient is not nervous/anxious.      Objective:     Vitals:    06/05/25 1541   BP: 116/78   BP Location: Left arm   Patient Position: Sitting   Pulse: 86   Resp: 18   SpO2: 98%   Weight: 52.3 kg (115 lb 3.2 oz)   Height: 5' 1" (1.549 m)       Physical " Exam  Vitals and nursing note reviewed.   Constitutional:       General: She is not in acute distress.     Appearance: Normal appearance. She is not diaphoretic.   HENT:      Head: Normocephalic and atraumatic.      Mouth/Throat:      Mouth: Mucous membranes are moist.      Pharynx: Oropharynx is clear.   Eyes:      Conjunctiva/sclera: Conjunctivae normal.   Cardiovascular:      Rate and Rhythm: Normal rate and regular rhythm.      Heart sounds: Normal heart sounds.   Pulmonary:      Effort: Pulmonary effort is normal.      Breath sounds: Normal breath sounds. No stridor. No wheezing or rales.   Abdominal:      General: There is no distension.      Tenderness: There is no abdominal tenderness.   Musculoskeletal:         General: Tenderness (left shoulder) present. Normal range of motion.      Cervical back: Normal range of motion.      Right lower leg: No edema.      Left lower leg: No edema.   Lymphadenopathy:      Cervical: No cervical adenopathy.   Skin:     General: Skin is warm and dry.      Findings: No rash.   Neurological:      Mental Status: She is alert and oriented to person, place, and time.   Psychiatric:         Mood and Affect: Mood and affect normal.         Behavior: Behavior normal. Behavior is cooperative.         No visits with results within 6 Month(s) from this visit.   Latest known visit with results is:   Orders Only on 04/24/2024   Component Date Value Ref Range Status    Vit D, 25-Hydroxy 04/24/2024 32  >30 ng/mL Final         Assessment:      1. Mixed hyperlipidemia Stable   2. Attention deficit hyperactivity disorder (ADHD), combined type Well controlled   3. Generalized anxiety disorder Well controlled   4. Menopausal disorder Stable         Plan:     Mixed hyperlipidemia  Comments:  continue low cholesterol diet and exercise regularly    Attention deficit hyperactivity disorder (ADHD), combined type  Comments:  continue Vyvanse daily, F/U in 3 months    Generalized anxiety  "disorder  Comments:  continue clonazepam as needed    Menopausal disorder  Comments:  continue estradiol daily         Current Medications[3]    There are no discontinued medications.    Health Maintenance   Topic Date Due    Hepatitis C Screening  Never done    HIV Screening  Never done    TETANUS VACCINE  Never done    Shingles Vaccine (1 of 2) Never done    Pneumococcal Vaccines (Age 50+) (1 of 1 - PCV) Never done    Mammogram  08/07/2024    COVID-19 Vaccine (3 - 2024-25 season) 09/01/2024    Lipid Panel  04/24/2029    RSV Vaccine (Age 60+ and Pregnant patients) (1 - 1-dose 75+ series) 12/13/2038    Influenza Vaccine  Completed    Colorectal Cancer Screening  Discontinued       Patient Instructions   RTC in 3 months for F/U or sooner if needed.    Keep appts with specialists as scheduled.    Patient Education       Low Cholesterol, Saturated Fat, and Trans Fat Diet   About this topic   Cholesterol, saturated fat, and trans fat are in many foods. These may raise your blood cholesterol levels. If your cholesterol is too high, this can cause health problems in your heart, liver, kidneys, and even your eyes. The key to lowering your risk of heart problems is to lower your bad fat intake.  Saturated fats and trans fats are the bad fats. These fats clog your arteries and raise your bad cholesterol. Saturated fats and trans fats are solid fats at room temperature. Saturated fats are animal fats. Trans fats are manmade fats. They add flavor to a lot of packaged foods. Staying away from saturated and trans fats will help your heart.  When you do eat foods with fat, make sure they have the good fats. Monounsaturated and polyunsaturated fats are good fats. These fats help raise your good cholesterol and protect your heart.  General   How to Lower Fat and Cholesterol in Your Diet   Read the labels of the foods you buy from the market to find out how much fat is present. Under 5% of total fat on a label means it is "low fat". " Over 20% of total fat on a label means it is high fat.  Eat high fiber foods, like soluble fiber. This type of fiber helps lower cholesterol in the body. Choose oatmeal, fruits (like apples), beans, and nuts to get the most soluble fiber.  Eat foods high in omega-3 fatty acids like sha seeds, walnuts, salmon, tuna, trout, herring, flaxseed, and soybeans. These foods help keep the heart healthy.  Limit your bad fat and oil intake.  Stay away from butter, stick margarine, shortening, lard, and palm and coconut oil. Pick plant-based spreads instead.  Limit mayonnaise, salad dressings, gravies, and sauces, unless it is made from low-fat ingredients.  Limit chocolate.  Do not eat high-fat processed foods like hot dogs, luna, sausage, ham and other luncheon meats high in fat, and some frozen foods. Pick fish, chicken, turkey, and lean meats instead.  Eat more dried beans, lentils, and tofu to get your protein.  Do not eat organ meats, like liver.  Choose nonfat or low-fat milk, yogurt, and cheese.  Use light or fat-free cream cheese and sour cream.  Eat lots of fruits and vegetables.  Pick whole grain breads, cereals, pastas, and rice.  Do not eat snacks that are high in fats like granola, cookies, pies, pastries, doughnuts, and croissants.  Stay away from deep fried foods.  Help When Cooking   Remove the fat portion of meats and the skin from poultry before cooking.  Bake, broil, grill, poach, or roast poultry, fish, and lean meats.  Drain and throw away the fat that drains out of meat as you cook it.  Try to add little or no fat to foods.  Use olive or canola oil for cooking or baking.  Steam your vegetables.  Use herbs or no-oil marinades to flavor foods.         Who should use this diet?   This diet is for people who are at high risk of getting health problems like heart disease, high blood pressure, diabetes, and others. This diet is also good for all people to follow to keep your heart healthy.  What foods are  good to eat?   Foods with good fats are:  Canola, peanut, and olive oil  Safflower, soybean, and corn oil  Walnuts, almonds, cashews, and peanuts  Pumpkin and sunflower seeds  [place] and tuna  Tofu  Soymilk  Avocado  What foods should be limited or avoided?   Stay away from these types of foods that have saturated fats:  Whole fat dairy products like cheese, ice cream, whole milk, and cream  Palm and coconut oils  High-fat meats like beef, lamb, poultry with the skin, luna, and sausage  Butter and lard  Stay away from these types of foods that may have trans fat:  Cookies, cakes, candy, doughnuts, baked goods, muffins, pizza dough, and pie crusts that are packaged  Fried foods  Frozen dinners  Chips and crackers  Microwave popcorn  Stick margarine and vegetable shortenings  Helpful tips   To help stay away from saturated fat:  Pick lean cuts of meat  Take the skin off chicken and turkey or pick skinless  Pick low-fat cheese, milk, and ice cream  Use liquid oils when cooking and baking, such as olive oil and canola oil  To help stay away from trans fat:  Look at your labels. Choose foods with 0% trans fat. Read the ingredient list. Avoid foods with partially hydrogenated oil in the ingredient list. This means there is trans fat in the product.  Where can I learn more?   American Heart Association   http://www.heart.org/HEARTORG/Conditions/Cholesterol/PreventionTreatmentofHighCholesterol/Know-Your-Fats_[facility]_305628_Article.jsp   Last Reviewed Date   2021-10-05  Consumer Information Use and Disclaimer   This information is not specific medical advice and does not replace information you receive from your health care provider. This is only a brief summary of general information. It does NOT include all information about conditions, illnesses, injuries, tests, procedures, treatments, therapies, discharge instructions or life-style choices that may apply to you. You must talk with your health care provider for complete  information about your health and treatment options. This information should not be used to decide whether or not to accept your health care providers advice, instructions or recommendations. Only your health care provider has the knowledge and training to provide advice that is right for you.   Copyright   Copyright © 2021 UpToDate, Inc. and its affiliates and/or licensors. All rights reserved.      Patient Education       Anxiety Discharge Instructions, Adult   About this topic   Anxiety can cause you to feel very worried. It can also cause physical symptoms like chest pain, stomach aches, or trouble sleeping. While mild anxiety is a normal response to stress, it can cause you problems in your everyday life. You may need follow-up care to help manage your anxiety. Anxiety happens in many forms, like:  Being scared all the time that something bad is going to happen. This is generalized anxiety.  Strong bursts of fear where your body has signs that may feel like a heart attack. This is called a panic attack.  Upsetting thoughts that happen often. There is a need to repeat doing certain things to help get rid of the anxiety caused by these thoughts. The thoughts or actions may be about checking on things, touching things, or worry about germs. This is an obsessive-compulsive disorder.  Strong fear of an object, place, or condition. This is a phobia.  Fear that others think bad things about you or being put down by other people. This is social anxiety.  Nightmares, flashbacks, staying away from people, or having panic attacks when reminded of a shocking or hurtful time or place from the past. This is post-traumatic stress.  Anxiety disorder may be treated in many ways. Some kinds of treatment have you talk about your beliefs, fears, and worries. You may learn how certain thoughts or feelings can raise anxiety. You may also learn what steps to take to lower anxiety. Other kinds of treatment may have you look back on a  hurtful event, sad memory, or something you are afraid of. The doctor will help you deal with the feelings that you may have. You may learn ways to cope with unwanted events or thoughts by looking at your fears in a way that feels safe.  What care is needed at home?   Ask your doctor what you need to do when you go home. Make sure you ask questions if you do not understand what the doctor says.  Set a time to talk with a counselor about your worries and feelings. This can help you with your anxiety.  Take care to follow all instructions when you take your medicines.  Limit alcohol and caffeine.  Learn ways to manage stress. Relaxation methods like reflection, deep breathing, and muscle relaxation may be helpful. Things like yoga, exercise, and mike chi are also good.  Talk about your feelings with family members and friends you trust. Talk to someone who can help you see how your thoughts at certain times may raise your anxiety.     What follow-up care is needed?   Your doctor may ask you to make visits to the office to check on your progress. Be sure to keep these visits.  What drugs may be needed?   The doctor may order drugs to help the physical signs of anxiety. Make sure that you take the drugs as taught to you by the doctor. Talk with your doctor about any side effects and ask how long you should take the drug.  Will physical activity be limited?   You may take part in physical activities. Some people are limited because of their anxiety or fear. Talk with your doctor about the right amount of activity for you.  What changes to diet are needed?   Eat a variety of healthy foods and limit drinks with caffeine. You should avoid alcohol, energy drinks, and over-the-counter stimulants.  What problems could happen?   If your anxiety is not treated, it can result in:  Staying away from work or social events  Not being able to do everyday tasks  Keeping away from family and friends  What can be done to prevent this  health problem?   Learn what events, people, or things upset you. Limit your contact with them.  Talk about your feelings. Talk to someone who can help you see how your thoughts at certain times may raise your anxiety.  Seek support from your friends and family. Find someone who calms you down. Ask if you can call them when you are getting anxious.  When do I need to call the doctor?   You feel you may harm yourself or someone else.  You can also call a mental health hotline for help.  You have any physical symptoms, such as chest pain, trouble breathing, or severe belly pain, that could be a sign of a serious problem.  If you are short of breath.  If you do not feel like you can be alone.  Teach Back: Helping You Understand   The Teach Back Method helps you understand the information we are giving you. After you talk with the staff, tell them in your own words what you learned. This helps to make sure the staff has described each thing clearly. It also helps to explain things that may have been confusing. Before going home, make sure you can do these:  I can tell you about my condition and the drugs I need to take.  I can tell you what may help lower my anxiety.  I can tell you what I will do if it is hard to breathe or I have chest pain.  I can tell you what I will do if I do not feel safe or cannot be alone.  Where can I learn more?   [employer]  https://www.gricelda.org/Learn-More/Mental-Health-Conditions/Anxiety-Disorders   National Health Service  https://www.nhs.uk/conditions/generalised-anxiety-disorder/symptoms/   [employer] [place] of Health ? [facility]  https://www.sudha.nih.gov/health/relieving-stress-anxiety-zmavcsklv-opepevxnep-wtaudkgmsn   [place] of Mental Health  http://www.nimh.nih.gov/health/publications/anxiety-disorders/complete-index.shtml   Last Reviewed Date   2021-06-08  Consumer Information Use and Disclaimer   This information is not specific medical advice and does not replace  information you receive from your health care provider. This is only a brief summary of general information. It does NOT include all information about conditions, illnesses, injuries, tests, procedures, treatments, therapies, discharge instructions or life-style choices that may apply to you. You must talk with your health care provider for complete information about your health and treatment options. This information should not be used to decide whether or not to accept your health care providers advice, instructions or recommendations. Only your health care provider has the knowledge and training to provide advice that is right for you.  Copyright   Copyright © 2021 UpToDate, Inc. and its affiliates and/or licensors. All rights reserved.      Patient Education     Attention Deficit Hyperactivity Disorder (ADHD) Discharge Instructions   About this topic   Attention deficit hyperactivity disorder is also called ADHD or ADD. Most often, this starts at a young age. This disorder makes it hard to focus or sit still. Children with ADHD may have trouble in school and at home. Adults may have problems at work. People with ADHD may also have problems with making decisions, time management, getting along well with others, or acting without thinking. While there is no cure for ADHD, you and your doctor can work on a plan that is best for you to treat the signs of ADHD.  What care is needed at home?   Ask your doctor what you need to do when you go home. Make sure you ask questions if you do not understand what the doctor says. This way you will know what you need to do.  Try to get enough sleep at night. Most often adults need 7 to 8 hours each night and children need 8 to 10 hours each night. Rest during the day if you are tired.  Eat and sleep at the same times every day.  Have a plan for where to keep things in your home. Use checklists, things to help you remember, and alarms. A list of things you may need to find in a  hurry can be helpful. These can help you put things in the right place and manage your time.  Work on getting better at reading and taking notes.  Have a space that is just for work or homework so you will be able to pay attention. This may be an office or a desk facing a wall. Try using headphones so you cannot hear the other noises.  Do one thing at a time. Keep a list of the next things you were planning to do or say.  Break large jobs or things you have to do into smaller jobs. This will make them easier to finish. Reward yourself along the way.  Have rules that are clear and easy to follow.   Look at where you are the strongest. Give rewards for good behavior, finished schoolwork, or for doing a good job at work.  Do not do too many things that might cause stress.  What follow-up care is needed?   Your doctor may ask you to make visits to the office to check on your progress. Be sure to keep these visits.  Your doctor may send you to a special mental health doctor. This person will talk with you about the problems you are having. Then, you can work together to find ways to help you manage them.  Your doctor may suggest you try talk therapy to help you live well with your ADHD.  What drugs may be needed?   The doctor may order drugs to:  Help lower your worry  Help you to pay attention  Help you be more calm  Help you be less impulsive  Help keep things in your life balanced  Care for low mood  Will physical activity be limited?   Physical activity will help keep your mind and body in good shape. Talk with your doctor about the right amount of activity for you.  What problems could happen?   Feeling badly about yourself  Raise the chances of you hurting yourself, such as injury in a car accident  Not do well in school and work  Trouble making friends or getting along well with others  Raise your chance to abuse alcohol or drugs  What can be done to prevent this health problem?   The cause of ADHD is not clear,  but to lower the chance of your child having ADHD:  Do not drink beer, wine, or mixed drinks (alcohol) while you are pregnant.  Do not smoke or use illegal drugs while you are pregnant.  Keep your child away from things like harmful toxins and chemicals.  Keep your child from having too much time in front of computers, TV, and video games.  Get plenty of exercise.  Be more aware of thoughts, emotions, and experiences each moment. This is mindfulness.  When do I need to call the doctor?   Drugs are not working  Symptoms are getting worse  Teach Back: Helping You Understand   The Teach Back Method helps you understand the information we are giving you. After you talk with the staff, tell them in your own words what you learned. This helps to make sure the staff has described each thing clearly. It also helps to explain things that may have been confusing. Before going home, make sure you can do these:  I can tell you about my condition.  I can tell you ways to help me pay attention.  I can tell you what I will do if I think my drugs are not working.  Last Reviewed Date   2024-05-01  Consumer Information Use and Disclaimer   This generalized information is a limited summary of diagnosis, treatment, and/or medication information. It is not meant to be comprehensive and should be used as a tool to help the user understand and/or assess potential diagnostic and treatment options. It does NOT include all information about conditions, treatments, medications, side effects, or risks that may apply to a specific patient. It is not intended to be medical advice or a substitute for the medical advice, diagnosis, or treatment of a health care provider based on the health care provider's examination and assessment of a patients specific and unique circumstances. Patients must speak with a health care provider for complete information about their health, medical questions, and treatment options, including any risks or benefits  regarding use of medications. This information does not endorse any treatments or medications as safe, effective, or approved for treating a specific patient. UpToDate, Inc. and its affiliates disclaim any warranty or liability relating to this information or the use thereof. The use of this information is governed by the Terms of Use, available at https://www.Kobo.com/en/know/clinical-effectiveness-terms   Copyright   Copyright © 2024 UpToDate, Inc. and its affiliates and/or licensors. All rights reserved.    Risks, benefits, and alternatives discussed with patient, Patient verbalized understanding of discussed plan of care. Asked patient if any further questions, answered no.    Future Appointments   Date Time Provider Department Center   9/9/2025  4:00 PM Carline Camacho, NP [facility]              Carline Camacho NP         [1]   Social History  Tobacco Use    Smoking status: Former     Types: Vaping with nicotine    Smokeless tobacco: Never   Substance Use Topics    Alcohol use: Yes    Drug use: Never   [2]   Patient Active Problem List  Diagnosis    Attention deficit hyperactivity disorder    Breast lump    Primary insomnia    Menopausal disorder    Generalized anxiety disorder    Superior glenoid labrum lesion of left shoulder    Complete rotator cuff tear or rupture of left shoulder, not specified as traumatic    Osteoarthritis of left AC (acromioclavicular) joint    Tendinopathy of left biceps tendon    Greater tuberosity of humerus fracture    Traumatic complete tear of left rotator cuff   [3]   Current Outpatient Medications   Medication Sig Dispense Refill    clonazePAM (KLONOPIN) 1 MG tablet Take 1 tablet (1 mg total) by mouth 2 (two) times daily as needed for Anxiety. 60 tablet 2    estradioL (ESTRACE) 1 MG tablet TAKE 1 TABLET BY MOUTH EVERY DAY 90 tablet 3    lisdexamfetamine (VYVANSE) 50 MG capsule Take 1 capsule (50 mg total) by mouth every morning. 30 capsule 0     No  current facility-administered medications for this visit.

## 2025-06-17 DIAGNOSIS — F90.2 ATTENTION DEFICIT HYPERACTIVITY DISORDER (ADHD), COMBINED TYPE: ICD-10-CM

## 2025-06-17 RX ORDER — LISDEXAMFETAMINE DIMESYLATE 50 MG/1
50 CAPSULE ORAL EVERY MORNING
Qty: 30 CAPSULE | Refills: 0 | Status: SHIPPED | OUTPATIENT
Start: 2025-06-17

## 2025-07-18 ENCOUNTER — PATIENT MESSAGE (OUTPATIENT)
Dept: PRIMARY CARE CLINIC | Facility: CLINIC | Age: 60
End: 2025-07-18
Payer: COMMERCIAL

## 2025-07-18 DIAGNOSIS — F90.2 ATTENTION DEFICIT HYPERACTIVITY DISORDER (ADHD), COMBINED TYPE: ICD-10-CM

## 2025-07-18 RX ORDER — LISDEXAMFETAMINE DIMESYLATE 50 MG/1
50 CAPSULE ORAL EVERY MORNING
Qty: 30 CAPSULE | Refills: 0 | Status: SHIPPED | OUTPATIENT
Start: 2025-07-18

## 2025-08-18 DIAGNOSIS — F90.2 ATTENTION DEFICIT HYPERACTIVITY DISORDER (ADHD), COMBINED TYPE: ICD-10-CM

## 2025-08-18 RX ORDER — LISDEXAMFETAMINE DIMESYLATE 50 MG/1
50 CAPSULE ORAL EVERY MORNING
Qty: 30 CAPSULE | Refills: 0 | Status: SHIPPED | OUTPATIENT
Start: 2025-08-18